# Patient Record
Sex: MALE | Race: BLACK OR AFRICAN AMERICAN | NOT HISPANIC OR LATINO | Employment: FULL TIME | URBAN - METROPOLITAN AREA
[De-identification: names, ages, dates, MRNs, and addresses within clinical notes are randomized per-mention and may not be internally consistent; named-entity substitution may affect disease eponyms.]

---

## 2017-02-11 ENCOUNTER — OFFICE VISIT (OUTPATIENT)
Dept: URGENT CARE | Facility: CLINIC | Age: 52
End: 2017-02-11
Payer: COMMERCIAL

## 2017-02-11 VITALS
SYSTOLIC BLOOD PRESSURE: 126 MMHG | WEIGHT: 192 LBS | OXYGEN SATURATION: 97 % | TEMPERATURE: 99 F | HEIGHT: 69 IN | HEART RATE: 76 BPM | BODY MASS INDEX: 28.44 KG/M2 | DIASTOLIC BLOOD PRESSURE: 84 MMHG

## 2017-02-11 DIAGNOSIS — R68.89 FLU-LIKE SYMPTOMS: Primary | ICD-10-CM

## 2017-02-11 DIAGNOSIS — Z20.828 EXPOSURE TO THE FLU: ICD-10-CM

## 2017-02-11 DIAGNOSIS — J06.9 URI WITH COUGH AND CONGESTION: ICD-10-CM

## 2017-02-11 LAB
CTP QC/QA: YES
FLUAV AG NPH QL: NEGATIVE
FLUBV AG NPH QL: NEGATIVE

## 2017-02-11 PROCEDURE — 99999 PR PBB SHADOW E&M-EST. PATIENT-LVL III: CPT | Mod: PBBFAC,,, | Performed by: PHYSICIAN ASSISTANT

## 2017-02-11 PROCEDURE — 3074F SYST BP LT 130 MM HG: CPT | Mod: S$GLB,,, | Performed by: PHYSICIAN ASSISTANT

## 2017-02-11 PROCEDURE — 3079F DIAST BP 80-89 MM HG: CPT | Mod: S$GLB,,, | Performed by: PHYSICIAN ASSISTANT

## 2017-02-11 PROCEDURE — 99214 OFFICE O/P EST MOD 30 MIN: CPT | Mod: S$GLB,,, | Performed by: PHYSICIAN ASSISTANT

## 2017-02-11 RX ORDER — PROMETHAZINE HYDROCHLORIDE AND DEXTROMETHORPHAN HYDROBROMIDE 6.25; 15 MG/5ML; MG/5ML
5 SYRUP ORAL NIGHTLY PRN
Qty: 120 ML | Refills: 0 | Status: SHIPPED | OUTPATIENT
Start: 2017-02-11 | End: 2017-02-21 | Stop reason: ALTCHOICE

## 2017-02-11 NOTE — MR AVS SNAPSHOT
[facility]  [address] [place] [address] [address]  [place] [address]  Phone: [phone]                  [facility]   2017 9:20 AM   Office Visit    Description:  Male : 1965   Provider:  Marie Spears PA-C   Department:  [facility]           Reason for Visit     Nasal Congestion     Cough           Diagnoses this Visit        Comments    Flu-like symptoms    -  Primary     Exposure to the flu         URI with cough and congestion     Zyrtec or Claritin  Flonase           To Do List           Future Appointments        Provider Department Dept Phone    2017 7:35 AM LABORATORY, [facility] [phone]    2017 9:40 AM Anders Linton MD [facility] - Internal Medicine [phone]      Goals (5 Years of Data)     None       These Medications        Disp Refills Start End    promethazine-dextromethorphan (PROMETHAZINE-DM) 6.25-15 mg/5 mL Syrp 120 mL 0 2017     Take 5 mLs by mouth nightly as needed (Cough). - Oral    Pharmacy: [facility] [address] [place] [address] #: [phone]         [facility]     [facility] -  Assistance  Registered nurses in the [facility] provide clinical advisement, health education, appointment booking, and other advisory services.  Call for this free service at [phone].             Medications           Message regarding Medications     Verify the changes and/or additions to your medication regime listed below are the same as discussed with your clinician today.  If any of these changes or additions are incorrect, please notify your healthcare provider.        START taking these NEW medications        Refills    promethazine-dextromethorphan (PROMETHAZINE-DM) 6.25-15 mg/5 mL Syrp 0    Sig: Take 5 mLs by mouth nightly as needed (Cough).    Class: Normal    Route: Oral           Verify that the below list of medications is an accurate representation  "of the medications you are currently taking.  If none reported, the list may be blank. If incorrect, please contact your healthcare provider. Carry this list with you in case of emergency.           Current Medications     butalbital-acetaminophen-caffeine -40 mg (FIORICET, ESGIC) -40 mg per tablet TAKE ONE TO TWO TABLETS BY MOUTH EVERY 4 HOURS AS NEEDED    hydrochlorothiazide (MICROZIDE) 12.5 mg capsule Take 1 capsule (12.5 mg total) by mouth once daily.    omeprazole (PRILOSEC) 40 MG capsule Take 1 capsule (40 mg total) by mouth once daily.    fexofenadine (ALLEGRA) 180 MG tablet Take 1 tablet (180 mg total) by mouth daily as needed (allergies0).    promethazine-dextromethorphan (PROMETHAZINE-DM) 6.25-15 mg/5 mL Syrp Take 5 mLs by mouth nightly as needed (Cough).           Clinical Reference Information           Your Vitals Were     BP Pulse Temp Height Weight SpO2    126/84 (BP Location: Right arm, Patient Position: Sitting, BP Method: Manual) 76 99 °F (37.2 °C) 5' 9" (1.753 m) 87.1 kg (192 lb 0.3 oz) 97%    BMI                28.36 kg/m2          Blood Pressure          Most Recent Value    BP  126/84      Allergies as of 2/11/2017     No Known Allergies      Immunizations Administered on Date of Encounter - 2/11/2017     None      Orders Placed During Today's Visit      Normal Orders This Visit    POCT Influenza A/B          2/11/2017 10:14 AM - Bere Calvillo LPN      Component Results     Component Value Flag Ref Range Units Status    Rapid Influenza A Ag Negative  Negative  Final    Rapid Influenza B Ag Negative  Negative  Final     Acceptable Yes    Final            [facility] Sign-Up     Activating your [facility] account is as easy as 1-2-3!     1) Visit [email or web address], select Sign Up Now, enter this activation code and your date of birth, then select Next.  [email or web address]  Expires: 3/28/2017 10:20 AM      2) Create a username and password to use when you visit [facility] in " the future and select a security question in case you lose your password and select Next.    3) Enter your e-mail address and click Sign Up!    Additional Information  If you have questions, please e-mail [email or web address] or call [phone] to talk to our [facility] staff. Remember, [facility] is NOT to be used for urgent needs. For medical emergencies, dial 911.         Instructions    Rest  Drink plenty of clear fluids--at least 64 ounces of water/juice  Normal saline nasal wash to irrigate sinuses and for congestion/runny nose  Cool mist humidifier/vaporizer  Practice good handwashing  Zyrtec or Claritin to help dry mucus and post nasal drip  Flonase to  help dry mucus and post nasal drip  Mucinex or Mucinex DM for cough and chest congestion  Tylenol or Ibuprofen for fever, headache and body aches  Warm salt water gargles for throat comfort  Chloraseptic spray or lozenges for throat comfort  Warm tea with honey for throat comfort  See PCP or go to ER if symptoms worsen or fail to improve with treatment.          Adult Self-Care for Colds  Colds are caused by viruses. They cant be cured with antibiotics. However, you can relieve symptoms and support your bodys efforts to heal itself. No matter which symptoms you have, be sure to drink plenty of fluids (water or clear soup); stop smoking and drinking alcohol; and get plenty of rest.   Understand a fever  · Take your temperature several times a day. If your fever is 100.4°F (38.0°C) for more than a day, call your doctor.  · Relax, lie down. Go to bed if you want. Just get off your feet and rest. Also, drink plenty of fluids to avoid dehydration.  · Take acetaminophen or a nonsteroidal anti-inflammatory agent (NSAID), such as ibuprofen.  Treat a troubled nose kindly  · Breathe steam or heated humidified air to open blocked nasal passages.  a hot shower or use a vaporizer. Be careful not to get burned by the steam.  · Saline nasal sprays and  decongestant tablets help open a stuffy nose. Antihistamines can also help, but they can cause side effects such as drowsiness and drying of the eyes, nose, and mouth.  Soothe a sore throat and cough  · Gargle every 2 hours with 1/4 teaspoon of salt dissolved in 1/2 cup of warm water. Suck on throat lozenges and cough drops to moisten your throat.  · Cough medicines are available but it is unclear how effective they actually are.  · Take acetaminophen or an NSAID, such as ibuprofen to ease throat pain  Ease digestive problems  · Put fluid back into your body. Take frequent sips of clear liquids such as water or broth. Do not drink beverages with a lot of sugar in them, such as juices and sodas. These can make diarrhea worse. Older children and adults can drink sports drinks.  · As your appetite returns, you can resume your normal diet. Ask your doctor whether there are any foods you should avoid.     When to seek medical care  When you first notice symptoms, ask your health care provider if antiviral medications are appropriate. Antibiotics should not be taken for colds or flu. Also, call your doctor if you have any of the following symptoms or if you arent feeling better after 7 days:  · Shortness of breath  · Pain or pressure in the chest or abdomen  · Worsening symptoms, especially after a period of improvement  · Fever of 100.4°F  (38.0°C) or higher, or fever that doesnt go down with medication  · Sudden dizziness or confusion  · Severe or continued vomiting  · Signs of dehydration, including extreme thirst, dark urine, infrequent urination, dry mouth  · Spotted, red, or very sore throat   Date Last Reviewed: 6/19/2014 © 2000-2016 [employer]. [address] [place] [address], [place] [address]. All rights reserved. This information is not intended as a substitute for professional medical care. Always follow your healthcare professional's instructions.             Language Assistance Services      ATTENTION: Language assistance services are available, free of charge. Please call [phone].      ATENCIÓN: Si habla tanyaañol, tiene a hammonds disposición servicios gratuitos de asistencia lingüística. Llame al [phone].     CHÚ Ý: N?u b?n nói Ti?ng Vi?t, có các d?ch v? h? tr? ngôn ng? mi?n phí dành cho b?n. G?i s? [phone].         [facility] complies with applicable Federal civil rights laws and does not discriminate on the basis of race, color, national origin, age, disability, or sex.

## 2017-02-11 NOTE — PATIENT INSTRUCTIONS
Rest  Drink plenty of clear fluids--at least 64 ounces of water/juice  Normal saline nasal wash to irrigate sinuses and for congestion/runny nose  Cool mist humidifier/vaporizer  Practice good handwashing  Zyrtec or Claritin to help dry mucus and post nasal drip  Flonase to  help dry mucus and post nasal drip  Mucinex or Mucinex DM for cough and chest congestion  Tylenol or Ibuprofen for fever, headache and body aches  Warm salt water gargles for throat comfort  Chloraseptic spray or lozenges for throat comfort  Warm tea with honey for throat comfort  See PCP or go to ER if symptoms worsen or fail to improve with treatment.          Adult Self-Care for Colds  Colds are caused by viruses. They cant be cured with antibiotics. However, you can relieve symptoms and support your bodys efforts to heal itself. No matter which symptoms you have, be sure to drink plenty of fluids (water or clear soup); stop smoking and drinking alcohol; and get plenty of rest.   Understand a fever  · Take your temperature several times a day. If your fever is 100.4°F (38.0°C) for more than a day, call your doctor.  · Relax, lie down. Go to bed if you want. Just get off your feet and rest. Also, drink plenty of fluids to avoid dehydration.  · Take acetaminophen or a nonsteroidal anti-inflammatory agent (NSAID), such as ibuprofen.  Treat a troubled nose kindly  · Breathe steam or heated humidified air to open blocked nasal passages.  a hot shower or use a vaporizer. Be careful not to get burned by the steam.  · Saline nasal sprays and decongestant tablets help open a stuffy nose. Antihistamines can also help, but they can cause side effects such as drowsiness and drying of the eyes, nose, and mouth.  Soothe a sore throat and cough  · Gargle every 2 hours with 1/4 teaspoon of salt dissolved in 1/2 cup of warm water. Suck on throat lozenges and cough drops to moisten your throat.  · Cough medicines are available but it is unclear how  effective they actually are.  · Take acetaminophen or an NSAID, such as ibuprofen to ease throat pain  Ease digestive problems  · Put fluid back into your body. Take frequent sips of clear liquids such as water or broth. Do not drink beverages with a lot of sugar in them, such as juices and sodas. These can make diarrhea worse. Older children and adults can drink sports drinks.  · As your appetite returns, you can resume your normal diet. Ask your doctor whether there are any foods you should avoid.     When to seek medical care  When you first notice symptoms, ask your health care provider if antiviral medications are appropriate. Antibiotics should not be taken for colds or flu. Also, call your doctor if you have any of the following symptoms or if you arent feeling better after 7 days:  · Shortness of breath  · Pain or pressure in the chest or abdomen  · Worsening symptoms, especially after a period of improvement  · Fever of 100.4°F  (38.0°C) or higher, or fever that doesnt go down with medication  · Sudden dizziness or confusion  · Severe or continued vomiting  · Signs of dehydration, including extreme thirst, dark urine, infrequent urination, dry mouth  · Spotted, red, or very sore throat   Date Last Reviewed: 6/19/2014  © 0125-1352 [employer]. [address] [place] [address], [place] [address]. All rights reserved. This information is not intended as a substitute for professional medical care. Always follow your healthcare professional's instructions.

## 2017-02-11 NOTE — PROGRESS NOTES
"Subjective:    Patient ID: Marco A Perez is a 51 y.o. male.    Chief Complaint: Nasal Congestion and Cough    URI    This is a new problem. The current episode started in the past 7 days (2 days ago). Associated symptoms include congestion, coughing, headaches, rhinorrhea, sneezing and a sore throat ("a little"). Pertinent negatives include no diarrhea, ear pain or vomiting. Wheezing: trying to breath through congested nose. Treatments tried: Coriciden. The treatment provided mild relief.     Review of Systems   Constitutional: Positive for chills and diaphoresis (feels hot then cold).   HENT: Positive for congestion, postnasal drip, rhinorrhea, sneezing and sore throat ("a little"). Negative for ear pain.    Respiratory: Positive for cough. Negative for shortness of breath. Wheezing: trying to breath through congested nose.    Gastrointestinal: Negative for diarrhea and vomiting.   Neurological: Positive for headaches.     Objective:     Visit Vitals    /84 (BP Location: Right arm, Patient Position: Sitting, BP Method: Manual)    Pulse 76    Temp 99 °F (37.2 °C)    Ht 5' 9" (1.753 m)    Wt 87.1 kg (192 lb 0.3 oz)    SpO2 97%    BMI 28.36 kg/m2       Physical Exam   Constitutional: He is oriented to person, place, and time. He appears well-developed and well-nourished. He is cooperative.   HENT:   Head: Normocephalic and atraumatic.   Right Ear: Hearing, tympanic membrane, external ear and ear canal normal.   Left Ear: Hearing, tympanic membrane, external ear and ear canal normal.   Nose: Mucosal edema present. Right sinus exhibits no maxillary sinus tenderness and no frontal sinus tenderness. Left sinus exhibits no maxillary sinus tenderness and no frontal sinus tenderness.   Mouth/Throat: Uvula is midline. Uvula swelling present.       Eyes: Conjunctivae, EOM and lids are normal. Pupils are equal, round, and reactive to light.   Neck: Normal range of motion. Neck supple.   Cardiovascular: Normal rate, " regular rhythm and normal heart sounds.    Pulmonary/Chest: Effort normal and breath sounds normal. No respiratory distress. He has no wheezes.   Musculoskeletal: Normal range of motion.   Lymphadenopathy:     He has no cervical adenopathy.   Neurological: He is alert and oriented to person, place, and time.   Skin: Skin is warm and dry.   Nursing note and vitals reviewed.    Assessment:     1. Flu-like symptoms    2. Exposure to the flu    3. URI with cough and congestion      Plan:   Flu-like symptoms  -     POCT Influenza A/B: negative    Exposure to the flu  -     POCT Influenza A/B: negative    URI with cough and congestion  Comments:  Zyrtec or Claritin or Allegra  Flonase  Orders:  -     promethazine-dextromethorphan (PROMETHAZINE-DM) 6.25-15 mg/5 mL Syrp; Take 5 mLs by mouth nightly as needed (Cough).  Dispense: 120 mL; Refill: 0      Rest  Drink plenty of clear fluids--at least 64 ounces of water/juice  Normal saline nasal wash to irrigate sinuses and for congestion/runny nose  Cool mist humidifier/vaporizer  Practice good handwashing  Zyrtec or Claritin to help dry mucus and post nasal drip  Flonase to  help dry mucus and post nasal drip  Mucinex or Mucinex DM for cough and chest congestion  Tylenol or Ibuprofen for fever, headache and body aches  Warm salt water gargles for throat comfort  Chloraseptic spray or lozenges for throat comfort  Warm tea with honey for throat comfort    If symptoms worsen or fail to improve, follow-up with primary care doctor or nearest ER. After visit summary given and discussed.  Patient verbalized understanding and agrees with treatment plan.  Patient remained stable and was discharged in no acute distress.

## 2017-02-21 ENCOUNTER — OFFICE VISIT (OUTPATIENT)
Dept: URGENT CARE | Facility: CLINIC | Age: 52
End: 2017-02-21
Payer: COMMERCIAL

## 2017-02-21 VITALS
OXYGEN SATURATION: 98 % | TEMPERATURE: 99 F | DIASTOLIC BLOOD PRESSURE: 70 MMHG | SYSTOLIC BLOOD PRESSURE: 120 MMHG | BODY MASS INDEX: 28.55 KG/M2 | WEIGHT: 193.31 LBS | HEART RATE: 82 BPM

## 2017-02-21 DIAGNOSIS — H65.191 OTHER ACUTE NONSUPPURATIVE OTITIS MEDIA OF RIGHT EAR, RECURRENCE NOT SPECIFIED: ICD-10-CM

## 2017-02-21 DIAGNOSIS — J01.00 ACUTE NON-RECURRENT MAXILLARY SINUSITIS: Primary | ICD-10-CM

## 2017-02-21 DIAGNOSIS — R07.0 PAIN IN THROAT: ICD-10-CM

## 2017-02-21 DIAGNOSIS — R05.9 COUGH: ICD-10-CM

## 2017-02-21 DIAGNOSIS — R07.0 THROAT PAIN: ICD-10-CM

## 2017-02-21 LAB
CTP QC/QA: YES
S PYO RRNA THROAT QL PROBE: NEGATIVE

## 2017-02-21 PROCEDURE — 87081 CULTURE SCREEN ONLY: CPT

## 2017-02-21 PROCEDURE — 99999 PR PBB SHADOW E&M-EST. PATIENT-LVL IV: CPT | Mod: PBBFAC,,, | Performed by: NURSE PRACTITIONER

## 2017-02-21 PROCEDURE — 3074F SYST BP LT 130 MM HG: CPT | Mod: S$GLB,,, | Performed by: NURSE PRACTITIONER

## 2017-02-21 PROCEDURE — 99214 OFFICE O/P EST MOD 30 MIN: CPT | Mod: S$GLB,,, | Performed by: NURSE PRACTITIONER

## 2017-02-21 PROCEDURE — 1160F RVW MEDS BY RX/DR IN RCRD: CPT | Mod: S$GLB,,, | Performed by: NURSE PRACTITIONER

## 2017-02-21 PROCEDURE — 3078F DIAST BP <80 MM HG: CPT | Mod: S$GLB,,, | Performed by: NURSE PRACTITIONER

## 2017-02-21 RX ORDER — CEFDINIR 300 MG/1
300 CAPSULE ORAL 2 TIMES DAILY
Qty: 14 CAPSULE | Refills: 0 | Status: SHIPPED | OUTPATIENT
Start: 2017-02-21 | End: 2017-02-28

## 2017-02-21 RX ORDER — BENZONATATE 200 MG/1
200 CAPSULE ORAL 3 TIMES DAILY PRN
Qty: 30 CAPSULE | Refills: 0 | Status: SHIPPED | OUTPATIENT
Start: 2017-02-21 | End: 2017-09-05

## 2017-02-21 NOTE — MR AVS SNAPSHOT
[facility]  [address] [place] [address] D  [place] [address]  Phone: [phone]                  [facility]   2017 7:00 PM   Office Visit    Description:  Male : 1965   Provider:  MEY Sheikh   Department:  [facility]           Reason for Visit     Cough           Diagnoses this Visit        Comments    Acute non-recurrent maxillary sinusitis    -  Primary     Other acute nonsuppurative otitis media of right ear, recurrence not specified         Cough         Pain in throat                To Do List           Future Appointments        Provider Department Dept Phone    2017 7:35 AM LAB[facility]ATORY, [facility] [phone]    2017 9:40 AM Anders Linton MD [facility] - Internal Medicine [phone]      Goals (5 Years of Data)     None       These Medications        Disp Refills Start End    cefdinir (OMNICEF) 300 MG capsule 14 capsule 0 2017    Take 1 capsule (300 mg total) by mouth 2 (two) times daily. - Oral    Pharmacy: [facility] [address] [place] [address] Ph #: [phone]       benzonatate (TESSALON) 200 MG capsule 30 capsule 0 2017     Take 1 capsule (200 mg total) by mouth 3 (three) times daily as needed for Cough. - Oral    Pharmacy: [facility] [address] [place] [address] Ph #: [phone]       brompheniramine-pseudoephedrine-dextromethorphan (DIMETAPP DM) 1-15-5 mg/5 mL Elix 118 mL 0 2017 3/3/2017    Take 5 mLs by mouth every 6 (six) hours as needed (cough). - Oral    Pharmacy: [facility] [address] [place] [address] Ph #: [phone]         [facility]     [facility] On Call Nurse Care Line -  Assistance  Registered nurses in the [facility] provide clinical advisement, health education, appointment booking, and other advisory services.  Call for this free service at [phone].             Medications           Message  regarding Medications     Verify the changes and/or additions to your medication regime listed below are the same as discussed with your clinician today.  If any of these changes or additions are incorrect, please notify your healthcare provider.        START taking these NEW medications        Refills    cefdinir (OMNICEF) 300 MG capsule 0    Sig: Take 1 capsule (300 mg total) by mouth 2 (two) times daily.    Class: Normal    Route: Oral    benzonatate (TESSALON) 200 MG capsule 0    Sig: Take 1 capsule (200 mg total) by mouth 3 (three) times daily as needed for Cough.    Class: Normal    Route: Oral    brompheniramine-pseudoephedrine-dextromethorphan (DIMETAPP DM) 1-15-5 mg/5 mL Elix 0    Sig: Take 5 mLs by mouth every 6 (six) hours as needed (cough).    Class: Print    Route: Oral      STOP taking these medications     promethazine-dextromethorphan (PROMETHAZINE-DM) 6.25-15 mg/5 mL Syrp Take 5 mLs by mouth nightly as needed (Cough).    fexofenadine (ALLEGRA) 180 MG tablet Take 1 tablet (180 mg total) by mouth daily as needed (allergies0).           Verify that the below list of medications is an accurate representation of the medications you are currently taking.  If none reported, the list may be blank. If incorrect, please contact your healthcare provider. Carry this list with you in case of emergency.           Current Medications     butalbital-acetaminophen-caffeine -40 mg (FIORICET, ESGIC) -40 mg per tablet TAKE ONE TO TWO TABLETS BY MOUTH EVERY 4 HOURS AS NEEDED    hydrochlorothiazide (MICROZIDE) 12.5 mg capsule Take 1 capsule (12.5 mg total) by mouth once daily.    benzonatate (TESSALON) 200 MG capsule Take 1 capsule (200 mg total) by mouth 3 (three) times daily as needed for Cough.    brompheniramine-pseudoephedrine-dextromethorphan (DIMETAPP DM) 1-15-5 mg/5 mL Elix Take 5 mLs by mouth every 6 (six) hours as needed (cough).    cefdinir (OMNICEF) 300 MG capsule Take 1 capsule (300 mg total) by  mouth 2 (two) times daily.    omeprazole (PRILOSEC) 40 MG capsule Take 1 capsule (40 mg total) by mouth once daily.           Clinical Reference Information           Your Vitals Were     BP Pulse Temp Weight SpO2 BMI    120/70 82 98.9 °F (37.2 °C) 87.7 kg (193 lb 5.5 oz) 98% 28.55 kg/m2      Blood Pressure          Most Recent Value    BP  120/70      Allergies as of 2/21/2017     No Known Allergies      Immunizations Administered on Date of Encounter - 2/21/2017     None      [facility] Sign-Up     Activating your [facility] account is as easy as 1-2-3!     1) Visit [email or web address], select Sign Up Now, enter this activation code and your date of birth, then select Next.  [email or web address]  Expires: 3/28/2017 10:20 AM      2) Create a username and password to use when you visit [facility] in the future and select a security question in case you lose your password and select Next.    3) Enter your e-mail address and click Sign Up!    Additional Information  If you have questions, please e-mail [email or web address] or call [phone] to talk to our [facility] staff. Remember, [facility] is NOT to be used for urgent needs. For medical emergencies, dial 911.         Instructions      Middle Ear Infection (Adult)  You have an infection of the middle ear, the space behind the eardrum. This is also called acute otitis media (AOM). Sometimes it is caused by the common cold. This is because congestion can block the internal passage (eustachian tube) that drains fluid from the middle ear. When the middle ear fills with fluid, bacteria can grow there and cause an infection. Oral antibiotics are used to treat this illness, not ear drops. Symptoms usually start to improve within 1 to 2 days of treatment.    Home care  The following are general care guidelines:  · Finish all of the antibiotic medicine given, even though you may feel better after the first few days.  · You may use over-the-counter medicine, such as acetaminophen or  ibuprofen, to control pain and fever, unless something else was prescribed. If you have chronic liver or kidney disease or have ever had a stomach ulcer or gastrointestinal bleeding, talk with your healthcare provider before using these medicines. Do not give aspirin to anyone under 18 years of age who has a fever. It may cause severe illness or death.  Follow-up care  Follow up with your healthcare provider, or as advised, in 2 weeks if all symptoms have not gotten better, or if hearing doesn't go back to normal within 1 month.  When to seek medical advice  Call your healthcare provider right away if any of these occur:  · Ear pain gets worse or does not improve after 3 days of treatment  · Unusual drowsiness or confusion  · Neck pain, stiff neck, or headache  · Fluid or blood draining from the ear canal  · Fever of 100.4°F (38°C) or as advised   · Seizure  Date Last Reviewed: 6/1/2016  © 2337-2414 [employer]. [address] [place] [address]. All rights reserved. This information is not intended as a substitute for professional medical care. Always follow your healthcare professional's instructions.             Language Assistance Services     ATTENTION: Language assistance services are available, free of charge. Please call [phone].      ATENCIÓN: Si habla shala, tiene a hammonds disposición servicios gratuitos de asistencia lingüística. Llame al [phone].     CHÚ Ý: N?u b?n nói Ti?ng Vi?t, có các d?ch v? h? tr? ngôn ng? mi?n phí dành cho b?n. G?i s? [phone].         [facility] complies with applicable Federal civil rights laws and does not discriminate on the basis of race, color, national origin, age, disability, or sex.

## 2017-02-21 NOTE — LETTER
February 21, 2017      [facility]  [address]  [place] [address]  Phone: [phone]       Patient: Marco A Perez   YOB: 1965  Date of Visit: 02/21/2017    To Whom It May Concern:    Marco A was at [facility] on 02/21/2017. He may return to work/school on 02/23/17 with no restrictions. If you have any questions or concerns, or if I can be of further assistance, please do not hesitate to contact me.    Sincerely,      MEY Sheikh

## 2017-02-22 NOTE — PROGRESS NOTES
Subjective:       Patient ID: Marco A Perez is a 51 y.o. male.    Chief Complaint: Cough    HPI Comments: 51 year old black male presents to Urgent Care with reports of cough,right ear pain with associated sore throat and sinus pressure that has been present for about 1 week and not improving.     Cough   This is a new problem. The current episode started 1 to 4 weeks ago. The problem has been gradually worsening. The cough is productive of sputum. Associated symptoms include ear congestion, ear pain, nasal congestion, postnasal drip and a sore throat. Pertinent negatives include no chest pain, chills, fever, rash or shortness of breath. Associated symptoms comments: Right ear pain and sinus pressure and tenderness  . Nothing aggravates the symptoms. Risk factors for lung disease include animal exposure. The treatment provided no relief. There is no history of environmental allergies.     Review of Systems   Constitutional: Negative for appetite change, chills and fever.   HENT: Positive for ear pain, postnasal drip, sinus pressure and sore throat. Negative for trouble swallowing.         Right ear pain   Eyes: Negative for visual disturbance.   Respiratory: Positive for cough. Negative for shortness of breath.    Cardiovascular: Negative for chest pain.   Gastrointestinal: Negative for abdominal pain, diarrhea, nausea and vomiting.   Endocrine: Negative for cold intolerance, polyphagia and polyuria.   Genitourinary: Negative for decreased urine volume and dysuria.   Musculoskeletal: Negative for back pain.   Skin: Negative for rash.   Allergic/Immunologic: Negative for environmental allergies and food allergies.   Neurological: Negative for dizziness, tremors, weakness and numbness.   Hematological: Does not bruise/bleed easily.   Psychiatric/Behavioral: Negative for confusion and hallucinations. The patient is not nervous/anxious and is not hyperactive.    All other systems reviewed and are negative.      Objective:      Physical Exam   Constitutional: He is oriented to person, place, and time. He appears well-developed and well-nourished.   HENT:   Head: Normocephalic.   Right Ear: Hearing and external ear normal. Tympanic membrane is erythematous and bulging.   Left Ear: External ear normal.   Nose: Mucosal edema and sinus tenderness present. Right sinus exhibits maxillary sinus tenderness. Left sinus exhibits maxillary sinus tenderness.       Mouth/Throat: Uvula is midline. Posterior oropharyngeal erythema present. No oropharyngeal exudate.   Eyes: Conjunctivae and EOM are normal. Pupils are equal, round, and reactive to light.   Neck: Normal range of motion. Neck supple. No JVD present.   Cardiovascular: Normal rate, regular rhythm, normal heart sounds and intact distal pulses.    Pulmonary/Chest: Effort normal and breath sounds normal. He has no wheezes.   Abdominal: Soft. Bowel sounds are normal. There is no tenderness.   Lymphadenopathy:     He has no cervical adenopathy.   Neurological: He is alert and oriented to person, place, and time.   Skin: Skin is warm and dry.   Psychiatric: He has a normal mood and affect. His behavior is normal. Judgment and thought content normal.   Nursing note and vitals reviewed.    Assessment:     1. Acute non-recurrent maxillary sinusitis    2. Other acute nonsuppurative otitis media of right ear, recurrence not specified    3. Cough    4. Pain in throat    5. Throat pain      Plan:  Marco A was seen today for cough.    Diagnoses and all orders for this visit:    Acute non-recurrent maxillary sinusitis  -     POCT Rapid Strep A    Other acute nonsuppurative otitis media of right ear, recurrence not specified  -     POCT Rapid Strep A    Cough  -     POCT Rapid Strep A    Pain in throat  -     Strep A culture, throat  -     POCT Rapid Strep A    Throat pain  -     Strep A culture, throat  -     POCT Rapid Strep A    Other orders  -     cefdinir (OMNICEF) 300 MG capsule; Take 1 capsule (300  mg total) by mouth 2 (two) times daily.  -     benzonatate (TESSALON) 200 MG capsule; Take 1 capsule (200 mg total) by mouth 3 (three) times daily as needed for Cough.  -     brompheniramine-pseudoephedrine-dextromethorphan (DIMETAPP DM) 1-15-5 mg/5 mL Elix; Take 5 mLs by mouth every 6 (six) hours as needed (cough).             Follow prescribed treatment plan as directed.  Stay hydrated and rest.  Report to ER if symptoms worsen.  Follow up with PCP in 2-3 days or sooner if symptoms do not improve.

## 2017-02-22 NOTE — PATIENT INSTRUCTIONS
Middle Ear Infection (Adult)  You have an infection of the middle ear, the space behind the eardrum. This is also called acute otitis media (AOM). Sometimes it is caused by the common cold. This is because congestion can block the internal passage (eustachian tube) that drains fluid from the middle ear. When the middle ear fills with fluid, bacteria can grow there and cause an infection. Oral antibiotics are used to treat this illness, not ear drops. Symptoms usually start to improve within 1 to 2 days of treatment.    Home care  The following are general care guidelines:  · Finish all of the antibiotic medicine given, even though you may feel better after the first few days.  · You may use over-the-counter medicine, such as acetaminophen or ibuprofen, to control pain and fever, unless something else was prescribed. If you have chronic liver or kidney disease or have ever had a stomach ulcer or gastrointestinal bleeding, talk with your healthcare provider before using these medicines. Do not give aspirin to anyone under 18 years of age who has a fever. It may cause severe illness or death.  Follow-up care  Follow up with your healthcare provider, or as advised, in 2 weeks if all symptoms have not gotten better, or if hearing doesn't go back to normal within 1 month.  When to seek medical advice  Call your healthcare provider right away if any of these occur:  · Ear pain gets worse or does not improve after 3 days of treatment  · Unusual drowsiness or confusion  · Neck pain, stiff neck, or headache  · Fluid or blood draining from the ear canal  · Fever of 100.4°F (38°C) or as advised   · Seizure  Date Last Reviewed: 6/1/2016  © 2889-6239 [employer]. [address] [place] [address], [place] [address]. All rights reserved. This information is not intended as a substitute for professional medical care. Always follow your healthcare professional's instructions.

## 2017-02-25 LAB — BACTERIA THROAT CULT: NORMAL

## 2017-03-03 ENCOUNTER — TELEPHONE (OUTPATIENT)
Dept: INTERNAL MEDICINE | Facility: CLINIC | Age: 52
End: 2017-03-03

## 2017-03-03 NOTE — TELEPHONE ENCOUNTER
----- Message from Olamide Cuellar sent at 3/3/2017 10:02 AM CST -----  Contact: pt  Pt wants call back to discuss high blood pressure, pt can be reached at [phone]///thxMW

## 2017-03-03 NOTE — TELEPHONE ENCOUNTER
----- Message from Delores Luque sent at 3/3/2017 11:53 AM CST -----  Call pt at [phone]//pt say he calling to give a different # he left message earlier//lida guzman

## 2017-03-06 ENCOUNTER — OFFICE VISIT (OUTPATIENT)
Dept: INTERNAL MEDICINE | Facility: CLINIC | Age: 52
End: 2017-03-06
Payer: COMMERCIAL

## 2017-03-06 VITALS
HEIGHT: 71 IN | HEART RATE: 83 BPM | BODY MASS INDEX: 27.38 KG/M2 | WEIGHT: 195.56 LBS | SYSTOLIC BLOOD PRESSURE: 132 MMHG | TEMPERATURE: 98 F | OXYGEN SATURATION: 99 % | DIASTOLIC BLOOD PRESSURE: 92 MMHG

## 2017-03-06 DIAGNOSIS — I10 ESSENTIAL HYPERTENSION: Primary | ICD-10-CM

## 2017-03-06 PROCEDURE — 90686 IIV4 VACC NO PRSV 0.5 ML IM: CPT | Mod: S$GLB,,, | Performed by: FAMILY MEDICINE

## 2017-03-06 PROCEDURE — 1160F RVW MEDS BY RX/DR IN RCRD: CPT | Mod: S$GLB,,, | Performed by: FAMILY MEDICINE

## 2017-03-06 PROCEDURE — 90471 IMMUNIZATION ADMIN: CPT | Mod: S$GLB,,, | Performed by: FAMILY MEDICINE

## 2017-03-06 PROCEDURE — 3075F SYST BP GE 130 - 139MM HG: CPT | Mod: S$GLB,,, | Performed by: FAMILY MEDICINE

## 2017-03-06 PROCEDURE — 99999 PR PBB SHADOW E&M-EST. PATIENT-LVL III: CPT | Mod: PBBFAC,,, | Performed by: FAMILY MEDICINE

## 2017-03-06 PROCEDURE — 99213 OFFICE O/P EST LOW 20 MIN: CPT | Mod: 25,S$GLB,, | Performed by: FAMILY MEDICINE

## 2017-03-06 PROCEDURE — 3080F DIAST BP >= 90 MM HG: CPT | Mod: S$GLB,,, | Performed by: FAMILY MEDICINE

## 2017-03-06 NOTE — MR AVS SNAPSHOT
[facility]  [address] [place] [address]  Phone: [phone]  Fax: [phone]                  Marco A Perez   3/6/2017 11:40 AM   Office Visit    Description:  Male : 1965   Provider:  Anders Linton MD   Department:  [facility]                To Do List           Future Appointments        Provider Department Dept Phone    3/13/2017 10:00 AM INTERNAL MEDICINE NURSE, [facility] [phone]    2017 7:35 AM LABORATORY, [facility] [phone]    2017 9:40 AM Anders Linton MD [facility] [phone]      Goals (5 Years of Data)     None      [facility] On [facility]     [facility] Care [facility] -  Assistance  Registered nurses in the [facility] provide clinical advisement, health education, appointment booking, and other advisory services.  Call for this free service at [phone].             Medications           Message regarding Medications     Verify the changes and/or additions to your medication regime listed below are the same as discussed with your clinician today.  If any of these changes or additions are incorrect, please notify your healthcare provider.             Verify that the below list of medications is an accurate representation of the medications you are currently taking.  If none reported, the list may be blank. If incorrect, please contact your healthcare provider. Carry this list with you in case of emergency.           Current Medications     butalbital-acetaminophen-caffeine -40 mg (FIORICET, ESGIC) -40 mg per tablet TAKE ONE TO TWO TABLETS BY MOUTH EVERY 4 HOURS AS NEEDED    hydrochlorothiazide (MICROZIDE) 12.5 mg capsule Take 1 capsule (12.5 mg total) by mouth once daily.    omeprazole (PRILOSEC) 40 MG capsule Take 1 capsule (40 mg total) by mouth once daily.    benzonatate (TESSALON) 200 MG capsule Take 1 capsule (200 mg total) by  "mouth 3 (three) times daily as needed for Cough.           Clinical Reference Information           Your Vitals Were     BP Pulse Temp Height    132/92 (BP Location: Right arm, Patient Position: Sitting, BP Method: Manual) 83 97.8 °F (36.6 °C) (Tympanic) 5' 11" (1.803 m)    Weight SpO2 BMI    88.7 kg (195 lb 8.8 oz) 99% 27.27 kg/m2      Blood Pressure          Most Recent Value    BP  (!)  132/92      Allergies as of 3/6/2017     No Known Allergies      Immunizations Administered on Date of Encounter - 3/6/2017     None      [facility] Sign-Up     Activating your [facility] account is as easy as 1-2-3!     1) Visit [email or web address], select Sign Up Now, enter this activation code and your date of birth, then select Next.  [email or web address]  Expires: 3/28/2017 10:20 AM      2) Create a username and password to use when you visit [facility] in the future and select a security question in case you lose your password and select Next.    3) Enter your e-mail address and click Sign Up!    Additional Information  If you have questions, please e-mail [email or web address] or call [phone] to talk to our [facility] staff. Remember, [facility] is NOT to be used for urgent needs. For medical emergencies, dial 911.         Language Assistance Services     ATTENTION: Language assistance services are available, free of charge. Please call [phone].      ATENCIÓN: Si habla español, tiene a hammonds disposición servicios gratuitos de asistencia lingüística. Llame al [phone].     [facility] Ý: N?u b?n nói Ti?ng Vi?t, có các d?ch v? h? tr? ngôn ng? mi?n phí dành cho b?n. G?i s? [phone].         [facility] complies with applicable Federal civil rights laws and does not discriminate on the basis of race, color, national origin, age, disability, or sex.        "

## 2017-03-13 ENCOUNTER — CLINICAL SUPPORT (OUTPATIENT)
Dept: INTERNAL MEDICINE | Facility: CLINIC | Age: 52
End: 2017-03-13
Payer: COMMERCIAL

## 2017-03-20 NOTE — PROGRESS NOTES
Subjective:       Patient ID: Marco A Perez is a 52 y.o. male.    Chief Complaint: htn  HPIrecent phys ant work and uc visit w decong w inc bp;bps very nl prior to decong use  Still mild sore thr at ear pain  130/90 home since no decong few days  Past Medical History:   Diagnosis Date    HTN (hypertension)     Migraine      History reviewed. No pertinent surgical history.  Family History   Problem Relation Age of Onset    Heart disease Mother     Heart disease Father     Diabetes Sister      Social History     Social History    Marital status:      Spouse name: N/A    Number of children: N/A    Years of education: N/A     Social History Main Topics    Smoking status: Never Smoker    Smokeless tobacco: None    Alcohol use No    Drug use: No    Sexual activity: Not Asked     Other Topics Concern    None     Social History Narrative       Review of Systems    Objective:      Physical Exam   Constitutional: He is oriented to person, place, and time. He appears well-developed and well-nourished.   HENT:   Head: Normocephalic and atraumatic.   Right Ear: External ear normal.   Left Ear: External ear normal.   Nose: Nose normal.   Mouth/Throat: Oropharynx is clear and moist. No oropharyngeal exudate.   Nl tms   Eyes: Conjunctivae and EOM are normal. Pupils are equal, round, and reactive to light.   Neck: Normal range of motion. Neck supple. Carotid bruit is not present.   Cardiovascular: Normal rate and regular rhythm.    Pulmonary/Chest: Effort normal and breath sounds normal.   Lymphadenopathy:     He has no cervical adenopathy.   Neurological: He is alert and oriented to person, place, and time.   Skin: Skin is warm and dry.   Psychiatric: He has a normal mood and affect. His behavior is normal. Judgment and thought content normal.       Assessment:       1. Essential hypertension        Plan:     few more days off cold med  Monitor bps  [facility] nurse bp chk one week  *Essential hypertension    Other  orders  -     Influenza - Quadrivalent (3 years & older) (PF)    **

## 2017-03-29 VITALS — SYSTOLIC BLOOD PRESSURE: 130 MMHG | DIASTOLIC BLOOD PRESSURE: 84 MMHG

## 2017-03-29 NOTE — PROGRESS NOTES
Pt came in for bp check. Pt bp 134/86 and 10 min recheck 130/84. Pt states home bps are 130s/90s. Pt informed will call with recommendations.

## 2017-04-13 ENCOUNTER — APPOINTMENT (OUTPATIENT)
Dept: RADIOLOGY | Facility: HOSPITAL | Age: 52
End: 2017-04-13
Attending: NURSE PRACTITIONER
Payer: COMMERCIAL

## 2017-04-13 ENCOUNTER — OFFICE VISIT (OUTPATIENT)
Dept: URGENT CARE | Facility: CLINIC | Age: 52
End: 2017-04-13
Payer: COMMERCIAL

## 2017-04-13 VITALS
OXYGEN SATURATION: 97 % | TEMPERATURE: 97 F | BODY MASS INDEX: 27.72 KG/M2 | SYSTOLIC BLOOD PRESSURE: 136 MMHG | WEIGHT: 198 LBS | RESPIRATION RATE: 18 BRPM | DIASTOLIC BLOOD PRESSURE: 90 MMHG | HEART RATE: 78 BPM | HEIGHT: 71 IN

## 2017-04-13 DIAGNOSIS — M67.432 GANGLION CYST OF WRIST, LEFT: ICD-10-CM

## 2017-04-13 DIAGNOSIS — M25.532 WRIST PAIN, ACUTE, LEFT: Primary | ICD-10-CM

## 2017-04-13 DIAGNOSIS — M25.532 WRIST PAIN, ACUTE, LEFT: ICD-10-CM

## 2017-04-13 PROCEDURE — 99214 OFFICE O/P EST MOD 30 MIN: CPT | Mod: S$GLB,,, | Performed by: NURSE PRACTITIONER

## 2017-04-13 PROCEDURE — 73110 X-RAY EXAM OF WRIST: CPT | Mod: 26,LT,, | Performed by: RADIOLOGY

## 2017-04-13 PROCEDURE — 3078F DIAST BP <80 MM HG: CPT | Mod: S$GLB,,, | Performed by: NURSE PRACTITIONER

## 2017-04-13 PROCEDURE — 1160F RVW MEDS BY RX/DR IN RCRD: CPT | Mod: S$GLB,,, | Performed by: NURSE PRACTITIONER

## 2017-04-13 PROCEDURE — 99999 PR PBB SHADOW E&M-EST. PATIENT-LVL IV: CPT | Mod: PBBFAC,,, | Performed by: NURSE PRACTITIONER

## 2017-04-13 PROCEDURE — 73110 X-RAY EXAM OF WRIST: CPT | Mod: TC,PO,LT

## 2017-04-13 PROCEDURE — 3075F SYST BP GE 130 - 139MM HG: CPT | Mod: S$GLB,,, | Performed by: NURSE PRACTITIONER

## 2017-04-13 RX ORDER — MELOXICAM 7.5 MG/1
7.5 TABLET ORAL DAILY
Qty: 30 TABLET | Refills: 0 | Status: SHIPPED | OUTPATIENT
Start: 2017-04-13 | End: 2017-09-05 | Stop reason: SDUPTHER

## 2017-04-13 NOTE — MR AVS SNAPSHOT
[facility]  [address]  [place] [address]  Phone: [phone]                  [facility]   2017 7:50 PM   Office Visit    Description:  Male : 1965   Provider:  Maria Elena Caban NP   Department:  [facility]           Reason for Visit     Cyst           Diagnoses this Visit        Comments    Wrist pain, acute, left    -  Primary     Ganglion cyst of wrist, left                To Do List           Future Appointments        Provider Department Dept Phone    2017 7:35 AM LABORATORY, [facility] [phone]    2017 9:40 AM Anders Linton MD [facility] [phone]      Goals (5 Years of Data)     None      Follow-Up and Disposition     Return if symptoms worsen or fail to improve.       These Medications        Disp Refills Start End    meloxicam (MOBIC) 7.5 MG tablet 30 tablet 0 2017     Take 1 tablet (7.5 mg total) by mouth once daily. - Oral    Pharmacy: [facility] [address] [place] [address] #: [phone]         [facility]     [facility] Care [facility] -  Assistance  Unless otherwise directed by your provider, please contact [facility], our nurse care line that is available for  assistance.     Registered nurses in the [facility] provide: appointment scheduling, clinical advisement, health education, and other advisory services.  Call: [phone] (toll free)               Medications           Message regarding Medications     Verify the changes and/or additions to your medication regime listed below are the same as discussed with your clinician today.  If any of these changes or additions are incorrect, please notify your healthcare provider.        START taking these NEW medications        Refills    meloxicam (MOBIC) 7.5 MG tablet 0    Sig: Take 1 tablet (7.5 mg total) by mouth once daily.    Class: Normal    Route: Oral          "  Verify that the below list of medications is an accurate representation of the medications you are currently taking.  If none reported, the list may be blank. If incorrect, please contact your healthcare provider. Carry this list with you in case of emergency.           Current Medications     butalbital-acetaminophen-caffeine -40 mg (FIORICET, ESGIC) -40 mg per tablet TAKE ONE TO TWO TABLETS BY MOUTH EVERY 4 HOURS AS NEEDED    hydrochlorothiazide (MICROZIDE) 12.5 mg capsule Take 1 capsule (12.5 mg total) by mouth once daily.    omeprazole (PRILOSEC) 40 MG capsule Take 1 capsule (40 mg total) by mouth once daily.    benzonatate (TESSALON) 200 MG capsule Take 1 capsule (200 mg total) by mouth 3 (three) times daily as needed for Cough.    meloxicam (MOBIC) 7.5 MG tablet Take 1 tablet (7.5 mg total) by mouth once daily.           Clinical Reference Information           Your Vitals Were     BP Pulse Temp Resp    136/90 (BP Location: Right arm, Patient Position: Sitting, BP Method: Automatic) 78 97.2 °F (36.2 °C) (Tympanic) 18    Height Weight SpO2 BMI    5' 11" (1.803 m) 89.8 kg (197 lb 15.6 oz) 97% 27.61 kg/m2      Blood Pressure          Most Recent Value    BP  (!)  136/90      Allergies as of 4/13/2017     No Known Allergies      Immunizations Administered on Date of Encounter - 4/13/2017     None      Orders Placed During Today's Visit     Future Labs/Procedures Expected by Expires    X-Ray Wrist Complete Left  4/13/2017 4/13/2018      [facility] Sign-Up     Activating your [facility] account is as easy as 1-2-3!     1) Visit [email or web address], select Sign Up Now, enter this activation code and your date of birth, then select Next.  [email or web address]  Expires: 5/28/2017  8:23 PM      2) Create a username and password to use when you visit [facility] in the future and select a security question in case you lose your password and select Next.    3) Enter your e-mail address and click Sign Up!    Additional " Information  If you have questions, please e-mail [email or web address] or call [phone] to talk to our [facility] staff. Remember, [facility] is NOT to be used for urgent needs. For medical emergencies, dial 911.         Instructions      Ganglion Cyst    A ganglion cyst usually is a painless bump on the wrist or finger joint. It connects to the joint capsule and grows like a balloon on a stalk. It is filled with joint fluid. The cause of a ganglion cyst is not known.   If the cyst puts pressure on a nearby nerve it may cause pain. Otherwise, cysts are painless and harmless. Most tend to disappear over time without treatment. Do not try to drain or break the cyst at home. This can cause harm and does not cure the problem.  If you are having pain from the cyst, a temporary wrist splint may be helpful to limit wrist motion. If this does not help, the fluid can be removed from the cyst. This should shrink the size of the cyst. If this doesnt give relief, the ganglion can be removed by surgery.  Home care  · If you are having wrist pain, use a wrist splint for 1-2 weeks at a time. You can buy one at many drug stores without a prescription.  · You may use over-the-counter pain medicine to control pain, unless another medicine was prescribed. If you have chronic liver or kidney disease or ever had a stomach ulcer or GI bleeding, talk with your healthcare provider before using these medicines.    · If a needle was used to drain the cyst fluid or inject medicine into it, keep the site clean and covered with a bandage for the first 24 hours. If a pressure dressing was applied, leave it in place for the time advised.  Follow-up care  Follow up with your healthcare provider, or as advised by our staff. Make an appointment for a repeat exam if pain continues for more than 2 weeks in a wrist splint.  When to seek medical advice  Call your healthcare provider right away if any of these occur:  · Increasing pain in the  wrist  · Redness over the cyst  · Fluid draining from the cyst  · Numbness or tingling in the hand or arm  Date Last Reviewed: 11/20/2015 © 2000-2016 [employer]. [address] [place] [address], [place] [address]. All rights reserved. This information is not intended as a substitute for professional medical care. Always follow your healthcare professional's instructions.        Treating Ganglia  A ganglion is a swelling (cyst) that forms on joint and tendon sheaths. They are most often found in the wrist. But they can also show up on the foot, fingers, or toes. Ganglia are believed to be caused by a rupture of the tissue that lines the joints and tendon sheaths (synovial tissue).   Ganglia are often difficult to treat without surgery--but nonsurgical methods may help relieve some of your symptoms.  Nonsurgical care  Nonsurgical methods include:   · Pads placed around the ganglion can ease pressure and friction.  · Removing the fluid may also relieve symptoms, though ganglia may come back. The gelatinous gel is removed through a large-bore needle. You may get a steroid injection after the cyst fluid is removed.  · Limiting movements or activities that increase pain may bring relief.  · Icing the ganglion for 15 to 20 minutes may temporarily relieve inflammation and pain.  · If your inflammation is severe, your healthcare provider may treat your symptoms with medicine.    Surgery  If a ganglion is causing ongoing or severe pain, your healthcare provider may recommend surgery. Your surgeon removes the entire ganglion wall during the procedure. He or she may also remove some surrounding tissue. If the ganglion has come through a tear in the capsule of the joint, the tear will have to be repaired. The joint will likely be get still while the tear heals.  After surgery  You may feel pain, swelling, numbness, or tingling for several weeks after surgery. Be sure to see your healthcare provider if you notice any  problems in the future. Although surgery is usually successful, there is a chance that the ganglion will come back.  Date Last Reviewed: 9/27/2015  © 5573-4554 [employer]. [address] [place] [address], [place] [address]. All rights reserved. This information is not intended as a substitute for professional medical care. Always follow your healthcare professional's instructions.             Language Assistance Services     ATTENTION: Language assistance services are available, free of charge. Please call [phone].      ATENCIÓN: Si carmenla shala, tiene a hammonds disposición servicios gratuitos de asistencia lingüística. Llame al [phone].     [facility] Ý: N?u b?n nói Ti?ng Vi?t, có các d?ch v? h? tr? ngôn ng? mi?n phí dành cho b?n. G?i s? [phone].         [place] [facility] complies with applicable Federal civil rights laws and does not discriminate on the basis of race, color, national origin, age, disability, or sex.

## 2017-04-14 NOTE — PATIENT INSTRUCTIONS
Ganglion Cyst    A ganglion cyst usually is a painless bump on the wrist or finger joint. It connects to the joint capsule and grows like a balloon on a stalk. It is filled with joint fluid. The cause of a ganglion cyst is not known.   If the cyst puts pressure on a nearby nerve it may cause pain. Otherwise, cysts are painless and harmless. Most tend to disappear over time without treatment. Do not try to drain or break the cyst at home. This can cause harm and does not cure the problem.  If you are having pain from the cyst, a temporary wrist splint may be helpful to limit wrist motion. If this does not help, the fluid can be removed from the cyst. This should shrink the size of the cyst. If this doesnt give relief, the ganglion can be removed by surgery.  Home care  · If you are having wrist pain, use a wrist splint for 1-2 weeks at a time. You can buy one at many drug stores without a prescription.  · You may use over-the-counter pain medicine to control pain, unless another medicine was prescribed. If you have chronic liver or kidney disease or ever had a stomach ulcer or GI bleeding, talk with your healthcare provider before using these medicines.    · If a needle was used to drain the cyst fluid or inject medicine into it, keep the site clean and covered with a bandage for the first 24 hours. If a pressure dressing was applied, leave it in place for the time advised.  Follow-up care  Follow up with your healthcare provider, or as advised by our staff. Make an appointment for a repeat exam if pain continues for more than 2 weeks in a wrist splint.  When to seek medical advice  Call your healthcare provider right away if any of these occur:  · Increasing pain in the wrist  · Redness over the cyst  · Fluid draining from the cyst  · Numbness or tingling in the hand or arm  Date Last Reviewed: 11/20/2015  © 6229-7184 [employer]. [address] [place] [address], [place] [address]. All rights reserved. This  information is not intended as a substitute for professional medical care. Always follow your healthcare professional's instructions.        Treating Ganglia  A ganglion is a swelling (cyst) that forms on joint and tendon sheaths. They are most often found in the wrist. But they can also show up on the foot, fingers, or toes. Ganglia are believed to be caused by a rupture of the tissue that lines the joints and tendon sheaths (synovial tissue).   Ganglia are often difficult to treat without surgery--but nonsurgical methods may help relieve some of your symptoms.  Nonsurgical care  Nonsurgical methods include:   · Pads placed around the ganglion can ease pressure and friction.  · Removing the fluid may also relieve symptoms, though ganglia may come back. The gelatinous gel is removed through a large-bore needle. You may get a steroid injection after the cyst fluid is removed.  · Limiting movements or activities that increase pain may bring relief.  · Icing the ganglion for 15 to 20 minutes may temporarily relieve inflammation and pain.  · If your inflammation is severe, your healthcare provider may treat your symptoms with medicine.    Surgery  If a ganglion is causing ongoing or severe pain, your healthcare provider may recommend surgery. Your surgeon removes the entire ganglion wall during the procedure. He or she may also remove some surrounding tissue. If the ganglion has come through a tear in the capsule of the joint, the tear will have to be repaired. The joint will likely be get still while the tear heals.  After surgery  You may feel pain, swelling, numbness, or tingling for several weeks after surgery. Be sure to see your healthcare provider if you notice any problems in the future. Although surgery is usually successful, there is a chance that the ganglion will come back.  Date Last Reviewed: 9/27/2015 © 2000-2016 [employer]. [address] [place] [address]. All rights reserved.  This information is not intended as a substitute for professional medical care. Always follow your healthcare professional's instructions.

## 2017-04-14 NOTE — PROGRESS NOTES
"Subjective:      Patient ID: Marco A Perez is a 52 y.o. male.    Chief Complaint: Cyst ((L) wrist )    HPI Comments: Mr. Perez presents to Urgent Care today with complaints of knot on wrist since Monday 4/10/17. He is requesting an X-ray. Denies trauma or any activity outside of his usual routine prior to onset of wrist pain and knot. He denies any previous injuries to the site. He has not tried any OTC medications or non-pharmacologic pain measures. Pain is worse with driving and with direct pressure to site. Pain is better when keeping the wrist still. He is right hand dominant.      Review of Systems   Constitutional: Negative.    HENT: Negative.    Respiratory: Negative.    Cardiovascular: Negative.    Gastrointestinal: Negative.    Musculoskeletal: Positive for arthralgias (left wrist).   Skin: Negative.    Neurological: Negative.    Hematological: Negative.        Objective:   BP (!) 136/90 (BP Location: Right arm, Patient Position: Sitting, BP Method: Automatic)  Pulse 78  Temp 97.2 °F (36.2 °C) (Tympanic)   Resp 18  Ht 5' 11" (1.803 m)  Wt 89.8 kg (197 lb 15.6 oz)  SpO2 97%  BMI 27.61 kg/m2  Physical Exam   Constitutional: He is oriented to person, place, and time. He appears well-developed and well-nourished. No distress.   HENT:   Head: Normocephalic and atraumatic.   Neck: Normal range of motion. Neck supple.   Cardiovascular: Normal rate.    Pulmonary/Chest: Effort normal. No respiratory distress.   Musculoskeletal: Normal range of motion.        Right wrist: He exhibits tenderness. He exhibits normal range of motion, no bony tenderness, no swelling, no effusion, no crepitus, no deformity and no laceration.        Arms:  Neurological: He is alert and oriented to person, place, and time.   Skin: Skin is warm and dry. No rash noted. He is not diaphoretic.   Nursing note and vitals reviewed.    Assessment:      1. Wrist pain, acute, left    2. Ganglion cyst of wrist, left       Plan:   Wrist pain, acute, " left  -     X-Ray Wrist Complete Left; Future; Expected date: 4/13/17  -     meloxicam (MOBIC) 7.5 MG tablet; Take 1 tablet (7.5 mg total) by mouth once daily.  Dispense: 30 tablet; Refill: 0    Ganglion cyst of wrist, left    Follow up with PCP for recheck of BP within one week.  Follow up with ortho or PCP if wrist pain is not improving.  Ace wrap applied. Ice pack provided.  Advised to make sure to eat with meloxicam.  Instructions, follow up, and supportive care as per AVS.

## 2017-09-05 ENCOUNTER — LAB VISIT (OUTPATIENT)
Dept: LAB | Facility: HOSPITAL | Age: 52
End: 2017-09-05
Attending: PHYSICIAN ASSISTANT
Payer: COMMERCIAL

## 2017-09-05 ENCOUNTER — OFFICE VISIT (OUTPATIENT)
Dept: INTERNAL MEDICINE | Facility: CLINIC | Age: 52
End: 2017-09-05
Payer: COMMERCIAL

## 2017-09-05 VITALS
OXYGEN SATURATION: 96 % | SYSTOLIC BLOOD PRESSURE: 132 MMHG | WEIGHT: 199.06 LBS | HEIGHT: 71 IN | TEMPERATURE: 96 F | HEART RATE: 82 BPM | BODY MASS INDEX: 27.87 KG/M2 | DIASTOLIC BLOOD PRESSURE: 86 MMHG

## 2017-09-05 DIAGNOSIS — M79.662 PAIN OF LEFT CALF: ICD-10-CM

## 2017-09-05 DIAGNOSIS — M25.532 WRIST PAIN, ACUTE, LEFT: ICD-10-CM

## 2017-09-05 DIAGNOSIS — M79.662 PAIN OF LEFT CALF: Primary | ICD-10-CM

## 2017-09-05 PROCEDURE — 84132 ASSAY OF SERUM POTASSIUM: CPT

## 2017-09-05 PROCEDURE — 3079F DIAST BP 80-89 MM HG: CPT | Mod: S$GLB,,, | Performed by: PHYSICIAN ASSISTANT

## 2017-09-05 PROCEDURE — 3075F SYST BP GE 130 - 139MM HG: CPT | Mod: S$GLB,,, | Performed by: PHYSICIAN ASSISTANT

## 2017-09-05 PROCEDURE — 3008F BODY MASS INDEX DOCD: CPT | Mod: S$GLB,,, | Performed by: PHYSICIAN ASSISTANT

## 2017-09-05 PROCEDURE — 36415 COLL VENOUS BLD VENIPUNCTURE: CPT | Mod: PO

## 2017-09-05 PROCEDURE — 99999 PR PBB SHADOW E&M-EST. PATIENT-LVL IV: CPT | Mod: PBBFAC,,, | Performed by: PHYSICIAN ASSISTANT

## 2017-09-05 PROCEDURE — 99213 OFFICE O/P EST LOW 20 MIN: CPT | Mod: S$GLB,,, | Performed by: PHYSICIAN ASSISTANT

## 2017-09-05 RX ORDER — MELOXICAM 7.5 MG/1
7.5 TABLET ORAL DAILY
Qty: 30 TABLET | Refills: 0 | Status: SHIPPED | OUTPATIENT
Start: 2017-09-05 | End: 2017-09-25

## 2017-09-05 NOTE — PROGRESS NOTES
Subjective:       Patient ID: Marco A Perez is a 52 y.o.B/ male.    Chief Complaint: Leg Pain (when sleeping)    HPI         He comes in today by himself and he has the above problem.  He feels like he is getting a charley horse about every other or every third night in his left calf.  This is been going on now for about 10-12 days.  He has had charley horses in his right calf in the past but he's had none of the past 3 months on the right side.  He denies any problem with leg pain when he is up ambulating.  He can walk a long distance without having any leg fatigue, heaviness or intense pain.  He is taking HCTZ 12.5 mg and we probably should check a potassium on him today.    Review of Systems    Otherwise negative concerning the VASCULAR, CV, RENAL, system review.    Objective:      Physical Exam    FEMORAL: The arteries are without bruit.  A palpate normally.  His popliteal arteries are 3+ and symmetrical.  His posterior tibialis is absent bilaterally.  His dorsalis pedis is 2+ and symmetrical.  Color and warmth of his leg and foot are all normal and equal.  He has a negative calf squeeze and Homans sign.  BP cuff to 200 mmHg was negative for any pain on either leg.    Assessment:       1. Pain of left calf        Plan:     1.  He should probably use NSAID of choice such as 220 mg Aleve taken to at a time every 12 hours or he can start back on his regular NSAID Mobic 7.5 mg.  2.  Also use low heat with a heating pad to the calf area.  Recheck if symptoms increase or persist over the next for 5 days.

## 2017-09-06 LAB — POTASSIUM SERPL-SCNC: 4.4 MMOL/L

## 2017-09-12 ENCOUNTER — PATIENT OUTREACH (OUTPATIENT)
Dept: ADMINISTRATIVE | Facility: HOSPITAL | Age: 52
End: 2017-09-12

## 2017-09-12 NOTE — LETTER
September 12, 2017    Marco A Perez  [address] [facility] [address]             [facility]  [address]  [facility] [address]  Phone: [phone] Dear Mr. Perez:    [facility] is committed to your overall health.  To help you get the most out of each of your visits, we will review your information to make sure you are up to date on all of your recommended tests and/or procedures.      Anders Linton MD has found that you may be due for    Colonoscopy     Influenza Vaccine         If you have had any of the above done at another facility, please bring the records or information with you so that your record at [facility] will be complete.    If you are currently taking medication, please bring it with you to your appointment for review.    We will be happy to assist you with scheduling any necessary appointments or you may contact the [facility] appointment desk at [phone] to schedule at your convenience.     Thank you for choosing [facility] for your healthcare needs,        Bobbye, LPN  Care Coordination Department  [facility]  [phone]

## 2017-09-13 ENCOUNTER — TELEPHONE (OUTPATIENT)
Dept: INTERNAL MEDICINE | Facility: CLINIC | Age: 52
End: 2017-09-13

## 2017-09-13 NOTE — TELEPHONE ENCOUNTER
----- Message from Francine Diane sent at 9/13/2017 11:35 AM CDT -----  Contact: [phone]  Pt would like test results. Please call back at [phone]. Thx. lj

## 2017-09-13 NOTE — TELEPHONE ENCOUNTER
Left voicemail for pt advising him lab results (see 09/05/17 results) we normal, and letter mailed to pt 09/11/17 (see result note documentation).

## 2017-09-19 ENCOUNTER — LAB VISIT (OUTPATIENT)
Dept: LAB | Facility: HOSPITAL | Age: 52
End: 2017-09-19
Attending: FAMILY MEDICINE
Payer: COMMERCIAL

## 2017-09-19 DIAGNOSIS — Z00.00 ROUTINE HEALTH MAINTENANCE: ICD-10-CM

## 2017-09-19 LAB
ANION GAP SERPL CALC-SCNC: 10 MMOL/L
BUN SERPL-MCNC: 15 MG/DL
CALCIUM SERPL-MCNC: 9.5 MG/DL
CHLORIDE SERPL-SCNC: 102 MMOL/L
CHOLEST SERPL-MCNC: 176 MG/DL
CHOLEST/HDLC SERPL: 4.1 {RATIO}
CO2 SERPL-SCNC: 27 MMOL/L
COMPLEXED PSA SERPL-MCNC: 0.62 NG/ML
CREAT SERPL-MCNC: 1.4 MG/DL
EST. GFR  (AFRICAN AMERICAN): >60 ML/MIN/1.73 M^2
EST. GFR  (NON AFRICAN AMERICAN): 57.8 ML/MIN/1.73 M^2
GLUCOSE SERPL-MCNC: 83 MG/DL
HDLC SERPL-MCNC: 43 MG/DL
HDLC SERPL: 24.4 %
LDLC SERPL CALC-MCNC: 109 MG/DL
NONHDLC SERPL-MCNC: 133 MG/DL
POTASSIUM SERPL-SCNC: 4.2 MMOL/L
SODIUM SERPL-SCNC: 139 MMOL/L
TRIGL SERPL-MCNC: 120 MG/DL

## 2017-09-19 PROCEDURE — 80048 BASIC METABOLIC PNL TOTAL CA: CPT

## 2017-09-19 PROCEDURE — 36415 COLL VENOUS BLD VENIPUNCTURE: CPT | Mod: PO

## 2017-09-19 PROCEDURE — 84153 ASSAY OF PSA TOTAL: CPT

## 2017-09-19 PROCEDURE — 80061 LIPID PANEL: CPT

## 2017-09-25 ENCOUNTER — OFFICE VISIT (OUTPATIENT)
Dept: INTERNAL MEDICINE | Facility: CLINIC | Age: 52
End: 2017-09-25
Payer: COMMERCIAL

## 2017-09-25 VITALS
WEIGHT: 191.38 LBS | BODY MASS INDEX: 25.92 KG/M2 | SYSTOLIC BLOOD PRESSURE: 126 MMHG | HEART RATE: 76 BPM | DIASTOLIC BLOOD PRESSURE: 86 MMHG | OXYGEN SATURATION: 98 % | TEMPERATURE: 98 F | HEIGHT: 72 IN

## 2017-09-25 DIAGNOSIS — Z00.00 ROUTINE HEALTH MAINTENANCE: Primary | ICD-10-CM

## 2017-09-25 DIAGNOSIS — R06.83 SNORING: ICD-10-CM

## 2017-09-25 DIAGNOSIS — I10 ESSENTIAL HYPERTENSION: ICD-10-CM

## 2017-09-25 DIAGNOSIS — R53.83 FATIGUE, UNSPECIFIED TYPE: ICD-10-CM

## 2017-09-25 DIAGNOSIS — Z12.11 SPECIAL SCREENING FOR MALIGNANT NEOPLASMS, COLON: ICD-10-CM

## 2017-09-25 PROCEDURE — 99999 PR PBB SHADOW E&M-EST. PATIENT-LVL III: CPT | Mod: PBBFAC,,, | Performed by: FAMILY MEDICINE

## 2017-09-25 PROCEDURE — 99396 PREV VISIT EST AGE 40-64: CPT | Mod: S$GLB,,, | Performed by: FAMILY MEDICINE

## 2017-09-25 RX ORDER — PRAVASTATIN SODIUM 40 MG/1
40 TABLET ORAL DAILY
Qty: 90 TABLET | Refills: 3 | Status: SHIPPED | OUTPATIENT
Start: 2017-09-25 | End: 2019-11-18 | Stop reason: SDUPTHER

## 2017-09-25 RX ORDER — HYDROCHLOROTHIAZIDE 12.5 MG/1
CAPSULE ORAL
Qty: 90 CAPSULE | Refills: 3 | Status: SHIPPED | OUTPATIENT
Start: 2017-09-25 | End: 2018-09-16 | Stop reason: SDUPTHER

## 2017-09-25 RX ORDER — ASPIRIN 81 MG/1
81 TABLET ORAL DAILY
COMMUNITY

## 2017-09-25 NOTE — PROGRESS NOTES
"Subjective:       Patient ID: Marco A Perez is a . male.    Chief Complaint: here for physical examination and issues  Below]    HPI Hypertension: blood pressures at home normal. Tolerating medicine; just took bit over an hour ago;*statin discussed     Migraine hx but long time since last one    Low sex drive returned. Mood ok. Some stress. Hx low T in past but last check ok    Snores/wife has noticed "gasping for air"'/some daytime sleepiness    Past Medical History   Diagnosis Date    HTN (hypertension)     Migraine      History reviewed. No pertinent past surgical history.  Family History   Problem Relation Age of Onset    Heart disease Mother     Heart disease Father     Diabetes Sister      Social History     Social History    Marital status:      Spouse name: N/A    Number of children: N/A    Years of education: N/A     Social History Main Topics    Smoking status: Never Smoker    Smokeless tobacco: None    Alcohol use No    Drug use: No    Sexual activity: Not Asked     Other Topics Concern    None     Social History Narrative     .     Review of Systems  Cardiovascular: no chest pain  Chest: no shortness of breath  Abd: no abd pain  Remainder review of systems negative    Objective:      Physical Exam   Constitutional: He is oriented to person, place, and time. He appears well-developed and well-nourished.   HENT:   Head: Normocephalic and atraumatic.   Right Ear: External ear normal.   Left Ear: External ear normal.   Nose: Nose normal.   Mouth/Throat: Oropharynx is clear and moist.   Nl tms   Eyes: Conjunctivae and EOM are normal. Pupils are equal, round, and reactive to light. No scleral icterus.   Neck: Normal range of motion. Neck supple. Carotid bruit is not present.   Cardiovascular: Normal rate, regular rhythm and normal heart sounds.  Exam reveals no gallop and no friction rub.    No murmur heard.  Pulmonary/Chest: Effort normal and breath sounds normal. He has no wheezes. "   Abdominal: Soft. Bowel sounds are normal. He exhibits no distension and no mass. There is no hepatosplenomegaly. There is no tenderness. There is no rebound and no guarding.   Musculoskeletal: Normal range of motion. He exhibits no tenderness.   Lymphadenopathy:     He has no cervical adenopathy.   Neurological: He is alert and oriented to person, place, and time. No cranial nerve deficit. Coordination normal.   Skin: Skin is warm and dry. No rash noted. No erythema.   Psychiatric: He has a normal mood and affect. His behavior is normal. Judgment and thought content normal.   Nursing note and vitals reviewed.      Assessment:       1. Routine health maintenance    2. Essential hypertension    3. Other migraine without status migrainosus, not intractable    4. Screen for colon cancer      snorking/fatigue  Plan:       **statin discussed pending lab today. If indicated will erxd; Side effects and dosing discussed  *    Lab 12 months and follow up after  Lab one yr and f/u   Also flp alt testosterone 6 weeks  Routine health maintenance  -     Lipid panel; Future; Expected date: 09/25/2018  -     PSA, Screening; Future; Expected date: 09/25/2018  -     Basic metabolic panel; Future; Expected date: 09/25/2018    Special screening for malignant neoplasms, colon  -     Case request GI: COLONOSCOPY    Essential hypertension  -     Lipid panel; Future; Expected date: 11/09/2017  -     ALT (SGPT); Future; Expected date: 12/24/2017    Fatigue, unspecified type  -     Testosterone; Future; Expected date: 09/25/2017  -     Home Sleep Studies; Future    Snoring  -     Home Sleep Studies; Future    Other orders  -     Cancel: Influenza - Quadrivalent (3 years & older)  -     pravastatin (PRAVACHOL) 40 MG tablet; Take 1 tablet (40 mg total) by mouth once daily.  Dispense: 90 tablet; Refill: 3

## 2017-10-24 RX ORDER — SODIUM, POTASSIUM,MAG SULFATES 17.5-3.13G
SOLUTION, RECONSTITUTED, ORAL ORAL
Qty: 354 ML | Refills: 0 | Status: ON HOLD | OUTPATIENT
Start: 2017-10-24 | End: 2017-11-10 | Stop reason: ALTCHOICE

## 2017-11-08 ENCOUNTER — TELEPHONE (OUTPATIENT)
Dept: INTERNAL MEDICINE | Facility: CLINIC | Age: 52
End: 2017-11-08

## 2017-11-08 NOTE — TELEPHONE ENCOUNTER
----- Message from Shawna Perez sent at 11/8/2017 11:10 AM CST -----  Contact: patient  Returning your call. Please call patient @  [phone]. Thanks, hesham

## 2017-11-10 ENCOUNTER — ANESTHESIA (OUTPATIENT)
Dept: ENDOSCOPY | Facility: HOSPITAL | Age: 52
End: 2017-11-10
Payer: COMMERCIAL

## 2017-11-10 ENCOUNTER — ANESTHESIA EVENT (OUTPATIENT)
Dept: ENDOSCOPY | Facility: HOSPITAL | Age: 52
End: 2017-11-10
Payer: COMMERCIAL

## 2017-11-10 ENCOUNTER — HOSPITAL ENCOUNTER (OUTPATIENT)
Facility: HOSPITAL | Age: 52
Discharge: HOME | End: 2017-11-10
Attending: INTERNAL MEDICINE | Admitting: INTERNAL MEDICINE
Payer: COMMERCIAL

## 2017-11-10 ENCOUNTER — SURGERY (OUTPATIENT)
Age: 52
End: 2017-11-10

## 2017-11-10 VITALS
HEART RATE: 66 BPM | HEIGHT: 72 IN | BODY MASS INDEX: 24.92 KG/M2 | DIASTOLIC BLOOD PRESSURE: 70 MMHG | SYSTOLIC BLOOD PRESSURE: 137 MMHG | RESPIRATION RATE: 18 BRPM | OXYGEN SATURATION: 100 % | WEIGHT: 184 LBS | TEMPERATURE: 98 F

## 2017-11-10 DIAGNOSIS — Z12.11 SCREENING FOR COLON CANCER: ICD-10-CM

## 2017-11-10 PROCEDURE — 63600175 PHARM REV CODE 636 W HCPCS: Performed by: NURSE ANESTHETIST, CERTIFIED REGISTERED

## 2017-11-10 PROCEDURE — 25000003 PHARM REV CODE 250: Performed by: NURSE ANESTHETIST, CERTIFIED REGISTERED

## 2017-11-10 PROCEDURE — 45380 COLONOSCOPY AND BIOPSY: CPT | Performed by: INTERNAL MEDICINE

## 2017-11-10 PROCEDURE — 45380 COLONOSCOPY AND BIOPSY: CPT | Mod: 33,,, | Performed by: INTERNAL MEDICINE

## 2017-11-10 PROCEDURE — 37000008 HC ANESTHESIA 1ST 15 MINUTES: Performed by: INTERNAL MEDICINE

## 2017-11-10 PROCEDURE — 88305 TISSUE EXAM BY PATHOLOGIST: CPT | Mod: 26,,, | Performed by: PATHOLOGY

## 2017-11-10 PROCEDURE — 37000009 HC ANESTHESIA EA ADD 15 MINS: Performed by: INTERNAL MEDICINE

## 2017-11-10 PROCEDURE — 27201012 HC FORCEPS, HOT/COLD, DISP: Performed by: INTERNAL MEDICINE

## 2017-11-10 PROCEDURE — 25000003 PHARM REV CODE 250: Performed by: INTERNAL MEDICINE

## 2017-11-10 PROCEDURE — 88305 TISSUE EXAM BY PATHOLOGIST: CPT | Performed by: PATHOLOGY

## 2017-11-10 RX ORDER — SODIUM CHLORIDE, SODIUM LACTATE, POTASSIUM CHLORIDE, CALCIUM CHLORIDE 600; 310; 30; 20 MG/100ML; MG/100ML; MG/100ML; MG/100ML
INJECTION, SOLUTION INTRAVENOUS CONTINUOUS
Status: DISCONTINUED | OUTPATIENT
Start: 2017-11-10 | End: 2017-11-10 | Stop reason: HOSPADM

## 2017-11-10 RX ORDER — LIDOCAINE HYDROCHLORIDE 10 MG/ML
INJECTION INFILTRATION; PERINEURAL
Status: DISCONTINUED | OUTPATIENT
Start: 2017-11-10 | End: 2017-11-10

## 2017-11-10 RX ORDER — PROPOFOL 10 MG/ML
VIAL (ML) INTRAVENOUS
Status: DISCONTINUED | OUTPATIENT
Start: 2017-11-10 | End: 2017-11-10

## 2017-11-10 RX ADMIN — PROPOFOL 20 MG: 10 INJECTION, EMULSION INTRAVENOUS at 02:11

## 2017-11-10 RX ADMIN — SODIUM CHLORIDE, SODIUM LACTATE, POTASSIUM CHLORIDE, AND CALCIUM CHLORIDE: .6; .31; .03; .02 INJECTION, SOLUTION INTRAVENOUS at 02:11

## 2017-11-10 RX ADMIN — LIDOCAINE HYDROCHLORIDE 50 MG: 10 INJECTION, SOLUTION INFILTRATION; PERINEURAL at 02:11

## 2017-11-10 RX ADMIN — PROPOFOL 70 MG: 10 INJECTION, EMULSION INTRAVENOUS at 02:11

## 2017-11-10 NOTE — ANESTHESIA PREPROCEDURE EVALUATION
11/10/2017  Marco A Perez is a 52 y.o., male.    Anesthesia Evaluation    I have reviewed the Patient Summary Reports.    I have reviewed the Nursing Notes.   I have reviewed the Medications.     Review of Systems  Anesthesia Hx:  No problems with previous Anesthesia  Denies Family Hx of Anesthesia complications.   Denies Personal Hx of Anesthesia complications.   Social:  No Alcohol Use, Non-Smoker    Hematology/Oncology:  Hematology Normal   Oncology Normal     Cardiovascular:   Hypertension Denies MI.   Denies CABG/stent.         Pulmonary:   Denies COPD.  Denies Asthma.  Denies Sleep Apnea.    Renal/:  Renal/ Normal     Hepatic/GI:   Bowel Prep. Denies GERD. Denies Liver Disease.  Denies Hepatitis.    Musculoskeletal:  Musculoskeletal Normal    Neurological:   Denies CVA. Headaches Denies Seizures.    Endocrine:  Endocrine Normal        Physical Exam  General:  Well nourished    Airway/Jaw/Neck:  Airway Findings: Mouth Opening: Normal Tongue: Normal  General Airway Assessment: Adult  Mallampati: II      Dental:  Dental Findings: In tact   Chest/Lungs:  Chest/Lungs Findings: Clear to auscultation, Normal Respiratory Rate     Heart/Vascular:  Heart Findings: Rate: Normal  Rhythm: Regular Rhythm  Sounds: Normal             Anesthesia Plan  Type of Anesthesia, risks & benefits discussed:  Anesthesia Type:  MAC  Patient's Preference:   Intra-op Monitoring Plan: standard ASA monitors  Intra-op Monitoring Plan Comments:   Post Op Pain Control Plan:   Post Op Pain Control Plan Comments:   Induction:   IV  Beta Blocker:  Patient is not currently on a Beta-Blocker (No further documentation required).       Informed Consent: Patient understands risks and agrees with Anesthesia plan.  Questions answered. Anesthesia consent signed with patient.  ASA Score: 2     Day of Surgery Review of History & Physical: I have  interviewed and examined the patient. I have reviewed the patient's H&P dated:  There are no significant changes.  H&P update referred to the surgeon.         Ready For Surgery From Anesthesia Perspective.        Eloped (saw a physician/midlevel provider but left without telling anyone)

## 2017-11-10 NOTE — ANESTHESIA RELEASE NOTE
Anesthesia Release from PACU Note    Patient: Marco A Perez    Procedure(s) Performed: Procedure(s) (LRB):  COLONOSCOPY (N/A)    Anesthesia type: MAC    Post pain: Adequate analgesia    Post assessment: no apparent anesthetic complications, tolerated procedure well and no evidence of recall    Last Vitals:   Visit Vitals  BP (!) 138/99 (BP Location: Left arm, Patient Position: Sitting)   Pulse 63   Temp 36.4 °C (97.6 °F) (Oral)   Resp 16   Ht 6' (1.829 m)   Wt 83.5 kg (184 lb)   SpO2 99%   BMI 24.95 kg/m²       Post vital signs: stable    Level of consciousness: awake    Nausea/Vomiting: no nausea/no vomiting    Complications: none    Airway Patency: patent    Respiratory: unassisted, spontaneous ventilation, room air    Cardiovascular: stable and blood pressure at baseline    Hydration: euvolemic

## 2017-11-10 NOTE — TRANSFER OF CARE
Anesthesia Transfer of Care Note    Patient: Marco A Perez    Procedure(s) Performed: Procedure(s) (LRB):  COLONOSCOPY (N/A)    Patient location: PACU    Anesthesia Type: MAC    Transport from OR: Transported from OR on room air with adequate spontaneous ventilation    Post pain: adequate analgesia    Post assessment: no apparent anesthetic complications and tolerated procedure well    Post vital signs: stable    Level of consciousness: awake    Nausea/Vomiting: no nausea/vomiting    Complications: none    Transfer of care protocol was followed      Last vitals:   Visit Vitals  BP (!) 138/99 (BP Location: Left arm, Patient Position: Sitting)   Pulse 63   Temp 36.4 °C (97.6 °F) (Oral)   Resp 16   Ht 6' (1.829 m)   Wt 83.5 kg (184 lb)   SpO2 99%   BMI 24.95 kg/m²

## 2017-11-10 NOTE — H&P
Short Stay Endoscopy History and Physical    PCP - Anders Linton MD    Procedure - Colonoscopy  ASA - 2  Mallampati - per anesthesia  History of Anesthesia problems - no  Family history Anesthesia problems -  no     HPI:  This is a 52 y.o. male here for evaluation of :     Average Risk Screening: yes  High risk screening: No  History of polyps: No  Anemia: No  Blood in stools: No  Diarrhea: No  Abdominal Pain: No    Review of Systems:  CONSTITUTIONAL: Denies weight change,  fatigue, fevers, chills, night sweats.  CARDIOVASCULAR: Denies chest pain, shortness of breath, orthopnea and edema.  RESPIRATORY: Denies cough, hemoptysis, dyspnea, and wheezing.  GI: See HPI.    Medical History:  Past Medical History:   Diagnosis Date    HTN (hypertension)     Migraine        Surgical History:   History reviewed. No pertinent surgical history.    Family History:   Family History   Problem Relation Age of Onset    Heart disease Mother     Heart disease Father     Diabetes Sister        Social History:   Social History   Substance Use Topics    Smoking status: Never Smoker    Smokeless tobacco: Never Used    Alcohol use No       Allergies: Reviewed.    Medications:  No current facility-administered medications on file prior to encounter.      Current Outpatient Prescriptions on File Prior to Encounter   Medication Sig Dispense Refill    hydrochlorothiazide (MICROZIDE) 12.5 mg capsule TAKE ONE CAPSULE BY MOUTH ONCE DAILY 90 capsule 3    omeprazole (PRILOSEC) 40 MG capsule Take 1 capsule (40 mg total) by mouth once daily. 30 capsule 0    pravastatin (PRAVACHOL) 40 MG tablet Take 1 tablet (40 mg total) by mouth once daily. 90 tablet 3    aspirin (ECOTRIN) 81 MG EC tablet Take 81 mg by mouth once daily.      butalbital-acetaminophen-caffeine -40 mg (FIORICET, ESGIC) -40 mg per tablet TAKE ONE TO TWO TABLETS BY MOUTH EVERY 4 HOURS AS NEEDED 30 tablet 3       Physical Exam:  Vital Signs:   Vitals:     11/10/17 1352   BP: (!) 138/99   Pulse: 63   Resp: 16   Temp: 97.6 °F (36.4 °C)     General Appearance: Well appearing in no acute distress  ENT: OP clear  Chest: CTA B  CV: RRR, no m/r/g  Abd: s/nt/nd/nabs  Ext: no edema    Labs:  Reviewed    Impression: Screening    Plan:  I have explained the risks and benefits of colonoscopy to the patient including but not limited to bleeding, perforation, infection, and death. The patient wishes to proceed with colonoscopy.

## 2017-11-10 NOTE — DISCHARGE SUMMARY
[facility] -   Brief Operative Note     SUMMARY     Surgery Date: 11/10/2017     Surgeon(s) and Role:     * Reed Penny MD - Primary    Assisting Surgeon: None    Pre-op Diagnosis:  Special screening for malignant neoplasms, colon [Z12.11]    Post-op Diagnosis:  Post-Op Diagnosis Codes:     * Special screening for malignant neoplasms, colon [Z12.11]    Procedure(s) (LRB):  COLONOSCOPY (N/A)    Anesthesia: Choice    Description of the findings of the procedure: Procedure completed. See Procedure note for details.     Findings/Key Components: Procedure completed. See Procedure note for details.     Prosthesis/Implants: None    Estimated Blood Loss: less than 10         Specimens:   Specimen (12h ago through future)    Start     Ordered    11/10/17 1444  Specimen to Pathology - Surgery  Once     Comments:  1. Rectal polyp      11/10/17 1444          Discharge Note    SUMMARY     Admit Date: 11/10/2017    Discharge Date and Time:  11/10/2017 2:47 PM    Hospital Course (synopsis of major diagnoses, care, treatment, and services provided during the course of the hospital stay): Procedure completed. See Procedure note for details.      Final Diagnosis: Post-Op Diagnosis Codes:     * Special screening for malignant neoplasms, colon [Z12.11]    Disposition: Home or Self Care    Follow Up/Patient Instructions:     Medications:  Reconciled Home Medications:   Current Discharge Medication List      CONTINUE these medications which have NOT CHANGED    Details   hydrochlorothiazide (MICROZIDE) 12.5 mg capsule TAKE ONE CAPSULE BY MOUTH ONCE DAILY  Qty: 90 capsule, Refills: 3      omeprazole (PRILOSEC) 40 MG capsule Take 1 capsule (40 mg total) by mouth once daily.  Qty: 30 capsule, Refills: 0      pravastatin (PRAVACHOL) 40 MG tablet Take 1 tablet (40 mg total) by mouth once daily.  Qty: 90 tablet, Refills: 3      aspirin (ECOTRIN) 81 MG EC tablet Take 81 mg by mouth once daily.       butalbital-acetaminophen-caffeine -40 mg (FIORICET, ESGIC) -40 mg per tablet TAKE ONE TO TWO TABLETS BY MOUTH EVERY 4 HOURS AS NEEDED  Qty: 30 tablet, Refills: 3             Discharge Procedure Orders  Diet general     Activity as tolerated       Follow-up Information     Anders Linton MD.    Specialty:  Family Medicine  Contact information:  [address]  [place] [address] [phone]

## 2017-11-10 NOTE — PLAN OF CARE
Dr Penny came to bedside and discussed findings. NO N/V,  no abdominal pain, no GI bleeding, and vitals stable.  Pt discharged from unit.

## 2017-11-10 NOTE — ANESTHESIA POSTPROCEDURE EVALUATION
Anesthesia Post Evaluation    Patient: Marco A Perez    Procedure(s) Performed: Procedure(s) (LRB):  COLONOSCOPY (N/A)    Final Anesthesia Type: MAC  Patient location during evaluation: PACU  Patient participation: Yes- Able to Participate  Level of consciousness: awake  Post-procedure vital signs: reviewed and stable  Pain management: adequate  Airway patency: patent  PONV status at discharge: No PONV  Anesthetic complications: no      Cardiovascular status: blood pressure returned to baseline and hemodynamically stable  Respiratory status: unassisted, room air and spontaneous ventilation  Hydration status: euvolemic  Follow-up not needed.        Visit Vitals  BP (!) 138/99 (BP Location: Left arm, Patient Position: Sitting)   Pulse 63   Temp 36.4 °C (97.6 °F) (Oral)   Resp 16   Ht 6' (1.829 m)   Wt 83.5 kg (184 lb)   SpO2 99%   BMI 24.95 kg/m²       Pain/Ainsley Score: Pain Assessment Performed: Yes (11/10/2017  1:46 PM)  Presence of Pain: denies (11/10/2017  1:46 PM)

## 2017-11-10 NOTE — DISCHARGE INSTRUCTIONS

## 2017-11-14 ENCOUNTER — TELEPHONE (OUTPATIENT)
Dept: INTERNAL MEDICINE | Facility: CLINIC | Age: 52
End: 2017-11-14

## 2017-11-14 ENCOUNTER — OFFICE VISIT (OUTPATIENT)
Dept: SLEEP MEDICINE | Facility: CLINIC | Age: 52
End: 2017-11-14
Payer: COMMERCIAL

## 2017-11-14 DIAGNOSIS — G47.33 OSA (OBSTRUCTIVE SLEEP APNEA): Primary | ICD-10-CM

## 2017-11-14 DIAGNOSIS — R53.83 FATIGUE, UNSPECIFIED TYPE: ICD-10-CM

## 2017-11-14 DIAGNOSIS — G47.33 OSA (OBSTRUCTIVE SLEEP APNEA): ICD-10-CM

## 2017-11-14 PROCEDURE — 99999 PR PBB SHADOW E&M-EST. PATIENT-LVL II: CPT | Mod: PBBFAC,,,

## 2017-11-14 PROCEDURE — 95806 SLEEP STUDY UNATT&RESP EFFT: CPT | Mod: S$GLB,,, | Performed by: INTERNAL MEDICINE

## 2017-11-14 PROCEDURE — 99499 UNLISTED E&M SERVICE: CPT | Mod: S$GLB,,, | Performed by: INTERNAL MEDICINE

## 2017-11-14 NOTE — Clinical Note
Assessment and Recommendations Device collected data for 4 hours 3 minutes. A very large amount of respiratory signal was excluded 230 minutes. Lowest oxygen saturation was 82%. Average heart rate was 66 bpm with a maximal heart rate of 86 bpm. Snoring was recorded above 50 dB 100% of the time. Apnea-hypopnea index: 15.8 events per hour. Total events 64 Obstructive sleep apnea-hypopnea syndrome detected. Treatment recommendations: CPAP would be the guideline recommendation for first-line treatment for obstructive sleep apnea. Either order in lab CPAP titration or AutoPap device. Referal for evaluation and treatment in the Ambulatory sleep disorder clinic. Other modalities for treatment of obstructive sleep apnea may be explored in patients with intolerant to CPAP including evaluation by ear nose and throat, mandibular advancement device, nonsupine sleep positioning device. Close follow-up to ensure resolution of symptoms

## 2017-12-27 RX ORDER — BUTALBITAL, ACETAMINOPHEN AND CAFFEINE 50; 325; 40 MG/1; MG/1; MG/1
TABLET ORAL
Qty: 30 TABLET | Refills: 3 | Status: SHIPPED | OUTPATIENT
Start: 2017-12-27 | End: 2019-10-31 | Stop reason: SDUPTHER

## 2018-01-22 ENCOUNTER — OFFICE VISIT (OUTPATIENT)
Dept: INTERNAL MEDICINE | Facility: CLINIC | Age: 53
End: 2018-01-22
Payer: COMMERCIAL

## 2018-01-22 ENCOUNTER — APPOINTMENT (OUTPATIENT)
Dept: RADIOLOGY | Facility: HOSPITAL | Age: 53
End: 2018-01-22
Attending: INTERNAL MEDICINE
Payer: COMMERCIAL

## 2018-01-22 VITALS
WEIGHT: 189.63 LBS | HEIGHT: 71 IN | RESPIRATION RATE: 20 BRPM | SYSTOLIC BLOOD PRESSURE: 138 MMHG | BODY MASS INDEX: 26.55 KG/M2 | HEART RATE: 87 BPM | OXYGEN SATURATION: 97 % | DIASTOLIC BLOOD PRESSURE: 92 MMHG | TEMPERATURE: 97 F

## 2018-01-22 DIAGNOSIS — R10.9 RIGHT FLANK PAIN: ICD-10-CM

## 2018-01-22 DIAGNOSIS — R10.9 RIGHT FLANK PAIN: Primary | ICD-10-CM

## 2018-01-22 DIAGNOSIS — R20.2 RIGHT HAND PARESTHESIA: ICD-10-CM

## 2018-01-22 LAB
BILIRUB SERPL-MCNC: NEGATIVE MG/DL
BLOOD, POC UA: NEGATIVE
GLUCOSE UR QL STRIP: NORMAL
KETONES UR QL STRIP: NEGATIVE
LEUKOCYTE ESTERASE URINE, POC: NEGATIVE
NITRITE, POC UA: NEGATIVE
PH, POC UA: 7
PROTEIN, POC: NEGATIVE
SPECIFIC GRAVITY, POC UA: 1
UROBILINOGEN, POC UA: NORMAL

## 2018-01-22 PROCEDURE — 99999 PR PBB SHADOW E&M-EST. PATIENT-LVL III: CPT | Mod: PBBFAC,,, | Performed by: INTERNAL MEDICINE

## 2018-01-22 PROCEDURE — 81000 URINALYSIS NONAUTO W/SCOPE: CPT | Mod: S$GLB,,, | Performed by: INTERNAL MEDICINE

## 2018-01-22 PROCEDURE — 74018 RADEX ABDOMEN 1 VIEW: CPT | Mod: 26,,, | Performed by: RADIOLOGY

## 2018-01-22 PROCEDURE — 99214 OFFICE O/P EST MOD 30 MIN: CPT | Mod: 25,S$GLB,, | Performed by: INTERNAL MEDICINE

## 2018-01-22 PROCEDURE — 74018 RADEX ABDOMEN 1 VIEW: CPT | Mod: TC,PO

## 2018-01-22 NOTE — PROGRESS NOTES
Subjective:      Patient ID: Marco A Perez is a 52 y.o. male.    Chief Complaint: Flank Pain ((R)**)      HPI     Mr. Marco A Perez is a patient of Anders Linton MD, who presents for right flank pain since Saturday. He denies any lifting or trauma to the area or falls. No blood in urine. No dysuria. No urinary frequency. No nocturia. No h/o kidney stones. No UTIs in the past. No cough out of the ordinary. No h/o gallbladder or liver dz. He reports his R flank/back pain only hurts when rotating at the torso to the right or standing straight up following bending down to tie his shoes. He has not taken any rx for this.     He also reports right pinky finger tingling over the past 1 week, which is exacerbated by pressing on his right medial elbow.     Past Medical History:   Diagnosis Date    HTN (hypertension)     Migraine      Past Surgical History:   Procedure Laterality Date    COLONOSCOPY N/A 11/10/2017    Procedure: COLONOSCOPY;  Surgeon: Reed Penny MD;  Location: [facility];  Service: Endoscopy;  Laterality: N/A;     Social History     Social History    Marital status:      Spouse name: N/A    Number of children: N/A    Years of education: N/A     Occupational History    Not on file.     Social History Main Topics    Smoking status: Never Smoker    Smokeless tobacco: Never Used    Alcohol use No    Drug use: No    Sexual activity: Not on file     Other Topics Concern    Not on file     Social History Narrative    No narrative on file     Family History   Problem Relation Age of Onset    Heart disease Mother     Heart disease Father     Diabetes Sister        Current Outpatient Prescriptions:     aspirin (ECOTRIN) 81 MG EC tablet, Take 81 mg by mouth once daily., Disp: , Rfl:     butalbital-acetaminophen-caffeine -40 mg (FIORICET, ESGIC) -40 mg per tablet, TAKE ONE TO TWO TABLETS BY MOUTH EVERY 4 HOURS AS NEEDED, Disp: 30 tablet, Rfl: 3    hydrochlorothiazide  "(MICROZIDE) 12.5 mg capsule, TAKE ONE CAPSULE BY MOUTH ONCE DAILY, Disp: 90 capsule, Rfl: 3    omeprazole (PRILOSEC) 40 MG capsule, Take 1 capsule (40 mg total) by mouth once daily., Disp: 30 capsule, Rfl: 0    pravastatin (PRAVACHOL) 40 MG tablet, Take 1 tablet (40 mg total) by mouth once daily., Disp: 90 tablet, Rfl: 3    Review of patient's allergies indicates:  No Known Allergies     Review of Systems   Negative    Objective:     BP (!) 138/92 (BP Location: Left arm, Patient Position: Sitting, BP Method: Medium (Automatic))   Pulse 87   Temp 96.9 °F (36.1 °C) (Tympanic)   Resp 20   Ht 5' 11" (1.803 m)   Wt 86 kg (189 lb 9.5 oz)   SpO2 97%   BMI 26.44 kg/m²     Physical Exam  GEN: A&O fully, NAD  PSYC: Normal affect  MSK: No muscle spasm, no TTP of right flank  NEURO: Right 5th digit numbness and tingling    Lab Results   Component Value Date    WBC 6.36 02/09/2015    HGB 13.6 (L) 02/09/2015    HCT 42.2 02/09/2015     02/09/2015    CHOL 176 09/19/2017    TRIG 120 09/19/2017    HDL 43 09/19/2017    LDLCALC 109.0 09/19/2017    ALT 16 09/23/2016    AST 12 09/10/2009     09/19/2017    K 4.2 09/19/2017     09/19/2017    CREATININE 1.4 09/19/2017    BUN 15 09/19/2017    CO2 27 09/19/2017    TSH 1.971 12/03/2012    PSA 0.62 09/19/2017       Assessment:      1. Right flank pain: Likely MSK due to strain. DDx includes nephrolithiasis, UTI, which we will r/o with u/a and KUB. Recommended continue heat pads on the right back and Tylenol 650 mg po 1 TID prn. Unlikely liver dz given recent normal LFTs and he is not a drinker.    2. Right hand paresthesia: Likely 2/2 the way he places his right elbow on his car's console, which I recommend he place a pad on.       Plan:   Right flank pain  -     X-Ray Abdomen AP 1 View; Future; Expected date: 01/22/2018  -     POCT Urinalysis    Right hand paresthesia  -     X-Ray Cervical Spine AP And Lateral; Future; Expected date: 01/22/2018        Follow-up if " symptoms worsen or fail to improve.

## 2018-08-20 ENCOUNTER — TELEPHONE (OUTPATIENT)
Dept: INTERNAL MEDICINE | Facility: CLINIC | Age: 53
End: 2018-08-20

## 2018-08-20 ENCOUNTER — OFFICE VISIT (OUTPATIENT)
Dept: INTERNAL MEDICINE | Facility: CLINIC | Age: 53
End: 2018-08-20
Payer: COMMERCIAL

## 2018-08-20 VITALS
HEIGHT: 71 IN | OXYGEN SATURATION: 98 % | SYSTOLIC BLOOD PRESSURE: 128 MMHG | RESPIRATION RATE: 18 BRPM | WEIGHT: 182.75 LBS | DIASTOLIC BLOOD PRESSURE: 88 MMHG | BODY MASS INDEX: 25.58 KG/M2 | TEMPERATURE: 98 F | HEART RATE: 83 BPM

## 2018-08-20 DIAGNOSIS — M54.32 LEFT SIDED SCIATICA: Primary | ICD-10-CM

## 2018-08-20 PROCEDURE — 99213 OFFICE O/P EST LOW 20 MIN: CPT | Mod: S$GLB,,, | Performed by: INTERNAL MEDICINE

## 2018-08-20 PROCEDURE — 3079F DIAST BP 80-89 MM HG: CPT | Mod: CPTII,S$GLB,, | Performed by: INTERNAL MEDICINE

## 2018-08-20 PROCEDURE — 3074F SYST BP LT 130 MM HG: CPT | Mod: CPTII,S$GLB,, | Performed by: INTERNAL MEDICINE

## 2018-08-20 PROCEDURE — 99999 PR PBB SHADOW E&M-EST. PATIENT-LVL III: CPT | Mod: PBBFAC,,, | Performed by: INTERNAL MEDICINE

## 2018-08-20 PROCEDURE — 3008F BODY MASS INDEX DOCD: CPT | Mod: CPTII,S$GLB,, | Performed by: INTERNAL MEDICINE

## 2018-08-20 RX ORDER — GUAIFENESIN AND PHENYLEPHRINE HCL 400; 10 MG/1; MG/1
1000 TABLET ORAL DAILY PRN
Qty: 60 CAPSULE | Refills: 3 | Status: SHIPPED | OUTPATIENT
Start: 2018-08-20 | End: 2020-05-04

## 2018-08-20 NOTE — TELEPHONE ENCOUNTER
----- Message from Francine Escalante sent at 8/20/2018  3:14 PM CDT -----  Contact: Patient  Patient called to speak with the nurse; he stated he was seen today but he didn't get his prescription and nothing was called in for him neither and it was for his Lower Back Pain. He can be contacted at [phone].    Thanks,  Francine

## 2018-08-20 NOTE — TELEPHONE ENCOUNTER
Dear Luna,    Please let pt know I've put in a prescription for turmeric, which may not be covered by insurance since it is OTC.     THanks!

## 2018-08-20 NOTE — PROGRESS NOTES
"Subjective:      Patient ID: Marco A Perez is a 53 y.o. male.    Chief Complaint: No chief complaint on file.      HPI     Mr. Marco A Perez is a patient of Anders Linton MD, who presents for intermittent LBP over the past 2 months, which seems to be worsening, located in the left LBP radiating down his left leg, 10/10 intensity on every morning, which improves with laying on his right side. He has tried ibuprofen, OTC "backache", which didn't help significantly. He has tried leg crossing, bending and tennis ball on the hip has also helped partially. No falls or trauma to the area. No MVAs.       Past Medical History:   Diagnosis Date    HTN (hypertension)     Migraine      History reviewed. No pertinent surgical history.  Social History     Socioeconomic History    Marital status:      Spouse name: Not on file    Number of children: Not on file    Years of education: Not on file    Highest education level: Not on file   Social Needs    Financial resource strain: Not on file    Food insecurity - worry: Not on file    Food insecurity - inability: Not on file    Transportation needs - medical: Not on file    Transportation needs - non-medical: Not on file   Occupational History    Not on file   Tobacco Use    Smoking status: Never Smoker    Smokeless tobacco: Never Used   Substance and Sexual Activity    Alcohol use: No    Drug use: No    Sexual activity: Not on file   Other Topics Concern    Not on file   Social History Narrative    Not on file     Family History   Problem Relation Age of Onset    Heart disease Mother     Heart disease Father     Diabetes Sister        Current Outpatient Medications:     aspirin (ECOTRIN) 81 MG EC tablet, Take 81 mg by mouth once daily., Disp: , Rfl:     butalbital-acetaminophen-caffeine -40 mg (FIORICET, ESGIC) -40 mg per tablet, TAKE ONE TO TWO TABLETS BY MOUTH EVERY 4 HOURS AS NEEDED, Disp: 30 tablet, Rfl: 3    hydrochlorothiazide " "(MICROZIDE) 12.5 mg capsule, TAKE ONE CAPSULE BY MOUTH ONCE DAILY, Disp: 90 capsule, Rfl: 3    pravastatin (PRAVACHOL) 40 MG tablet, Take 1 tablet (40 mg total) by mouth once daily., Disp: 90 tablet, Rfl: 3    omeprazole (PRILOSEC) 40 MG capsule, Take 1 capsule (40 mg total) by mouth once daily., Disp: 30 capsule, Rfl: 0    Review of patient's allergies indicates:  No Known Allergies     Review of Systems   All remaining systems negative    Objective:     /88 (BP Location: Left arm, Patient Position: Sitting, BP Method: Large (Manual))   Pulse 83   Temp 97.7 °F (36.5 °C) (Tympanic)   Resp 18   Ht 5' 11" (1.803 m)   Wt 82.9 kg (182 lb 12.2 oz)   SpO2 98%   BMI 25.49 kg/m²     Physical Exam  GEN: A&O fully, NAD  PSYC: Normal affect  NEURO: +stright leg raise on left; right WNL      Lab Results   Component Value Date    WBC 6.36 02/09/2015    HGB 13.6 (L) 02/09/2015    HCT 42.2 02/09/2015     02/09/2015    CHOL 176 09/19/2017    TRIG 120 09/19/2017    HDL 43 09/19/2017    LDLCALC 109.0 09/19/2017    ALT 16 09/23/2016    AST 12 09/10/2009     09/19/2017    K 4.2 09/19/2017     09/19/2017    CREATININE 1.4 09/19/2017    BUN 15 09/19/2017    CO2 27 09/19/2017    CALCIUM 9.5 09/19/2017    PSA 0.62 09/19/2017       Assessment:      1. Left sided sciatica: Risks and benefits discussed and patient chose to move forward with taking cyclobenzaprine (which was previously prescribed) and turmeric 500 mg 2 caps daily. If not sufficient, he will start PT and understands to f/u with PCP for possible gabapentin if all of the above not effective.        Plan:   Left sided sciatica  -     Ambulatory consult to Physical Therapy        Follow-up if symptoms worsen or fail to improve.    "

## 2018-09-17 RX ORDER — HYDROCHLOROTHIAZIDE 12.5 MG/1
CAPSULE ORAL
Qty: 90 CAPSULE | Refills: 3 | Status: SHIPPED | OUTPATIENT
Start: 2018-09-17 | End: 2019-03-05

## 2018-09-17 NOTE — TELEPHONE ENCOUNTER
----- Message from Kylee Maldonado sent at 9/17/2018  8:49 AM CDT -----  Contact: Marco A [phone]  Patient is currently out of blood pressure medication and needs a refill. Please call it into [facility] in [place].

## 2018-09-20 ENCOUNTER — LAB VISIT (OUTPATIENT)
Dept: LAB | Facility: HOSPITAL | Age: 53
End: 2018-09-20
Attending: FAMILY MEDICINE
Payer: COMMERCIAL

## 2018-09-20 DIAGNOSIS — I10 ESSENTIAL HYPERTENSION: ICD-10-CM

## 2018-09-20 DIAGNOSIS — Z00.00 ROUTINE HEALTH MAINTENANCE: ICD-10-CM

## 2018-09-20 DIAGNOSIS — R53.83 FATIGUE, UNSPECIFIED TYPE: ICD-10-CM

## 2018-09-20 LAB
ALT SERPL W/O P-5'-P-CCNC: 14 U/L
ANION GAP SERPL CALC-SCNC: 7 MMOL/L
BUN SERPL-MCNC: 14 MG/DL
CALCIUM SERPL-MCNC: 9.9 MG/DL
CHLORIDE SERPL-SCNC: 105 MMOL/L
CHOLEST SERPL-MCNC: 169 MG/DL
CHOLEST/HDLC SERPL: 4 {RATIO}
CO2 SERPL-SCNC: 31 MMOL/L
COMPLEXED PSA SERPL-MCNC: 7.3 NG/ML
CREAT SERPL-MCNC: 1.4 MG/DL
EST. GFR  (AFRICAN AMERICAN): >60 ML/MIN/1.73 M^2
EST. GFR  (NON AFRICAN AMERICAN): 57.4 ML/MIN/1.73 M^2
GLUCOSE SERPL-MCNC: 88 MG/DL
HDLC SERPL-MCNC: 42 MG/DL
HDLC SERPL: 24.9 %
LDLC SERPL CALC-MCNC: 105.2 MG/DL
NONHDLC SERPL-MCNC: 127 MG/DL
POTASSIUM SERPL-SCNC: 4.1 MMOL/L
SODIUM SERPL-SCNC: 143 MMOL/L
TESTOST SERPL-MCNC: 379 NG/DL
TRIGL SERPL-MCNC: 109 MG/DL

## 2018-09-20 PROCEDURE — 84403 ASSAY OF TOTAL TESTOSTERONE: CPT

## 2018-09-20 PROCEDURE — 84153 ASSAY OF PSA TOTAL: CPT

## 2018-09-20 PROCEDURE — 80048 BASIC METABOLIC PNL TOTAL CA: CPT

## 2018-09-20 PROCEDURE — 84460 ALANINE AMINO (ALT) (SGPT): CPT

## 2018-09-20 PROCEDURE — 36415 COLL VENOUS BLD VENIPUNCTURE: CPT | Mod: PO

## 2018-09-20 PROCEDURE — 80061 LIPID PANEL: CPT

## 2018-09-21 ENCOUNTER — TELEPHONE (OUTPATIENT)
Dept: INTERNAL MEDICINE | Facility: CLINIC | Age: 53
End: 2018-09-21

## 2018-09-21 DIAGNOSIS — R97.20 ELEVATED PSA: Primary | ICD-10-CM

## 2018-09-21 NOTE — TELEPHONE ENCOUNTER
----- Message from Yolande Rodarte sent at 9/21/2018  2:19 PM CDT -----  Contact: pt  States he's returning a call regarding his test results. Please call pt at [phone]. Thank you

## 2019-03-05 ENCOUNTER — CLINICAL SUPPORT (OUTPATIENT)
Dept: CARDIOLOGY | Facility: CLINIC | Age: 54
End: 2019-03-05
Payer: COMMERCIAL

## 2019-03-05 ENCOUNTER — OFFICE VISIT (OUTPATIENT)
Dept: CARDIOLOGY | Facility: CLINIC | Age: 54
End: 2019-03-05
Payer: COMMERCIAL

## 2019-03-05 VITALS
DIASTOLIC BLOOD PRESSURE: 90 MMHG | BODY MASS INDEX: 27.04 KG/M2 | HEART RATE: 74 BPM | WEIGHT: 193.13 LBS | HEIGHT: 71 IN | SYSTOLIC BLOOD PRESSURE: 150 MMHG

## 2019-03-05 DIAGNOSIS — I10 ESSENTIAL HYPERTENSION: Primary | ICD-10-CM

## 2019-03-05 DIAGNOSIS — E78.49 OTHER HYPERLIPIDEMIA: ICD-10-CM

## 2019-03-05 DIAGNOSIS — I10 ESSENTIAL HYPERTENSION: ICD-10-CM

## 2019-03-05 DIAGNOSIS — G47.33 OSA (OBSTRUCTIVE SLEEP APNEA): ICD-10-CM

## 2019-03-05 PROCEDURE — 3080F DIAST BP >= 90 MM HG: CPT | Mod: CPTII,S$GLB,, | Performed by: INTERNAL MEDICINE

## 2019-03-05 PROCEDURE — 93000 ELECTROCARDIOGRAM COMPLETE: CPT | Mod: S$GLB,,, | Performed by: INTERNAL MEDICINE

## 2019-03-05 PROCEDURE — 3008F PR BODY MASS INDEX (BMI) DOCUMENTED: ICD-10-PCS | Mod: CPTII,S$GLB,, | Performed by: INTERNAL MEDICINE

## 2019-03-05 PROCEDURE — 3080F PR MOST RECENT DIASTOLIC BLOOD PRESSURE >= 90 MM HG: ICD-10-PCS | Mod: CPTII,S$GLB,, | Performed by: INTERNAL MEDICINE

## 2019-03-05 PROCEDURE — 99999 PR PBB SHADOW E&M-EST. PATIENT-LVL III: ICD-10-PCS | Mod: PBBFAC,,, | Performed by: INTERNAL MEDICINE

## 2019-03-05 PROCEDURE — 3077F PR MOST RECENT SYSTOLIC BLOOD PRESSURE >= 140 MM HG: ICD-10-PCS | Mod: CPTII,S$GLB,, | Performed by: INTERNAL MEDICINE

## 2019-03-05 PROCEDURE — 3008F BODY MASS INDEX DOCD: CPT | Mod: CPTII,S$GLB,, | Performed by: INTERNAL MEDICINE

## 2019-03-05 PROCEDURE — 3077F SYST BP >= 140 MM HG: CPT | Mod: CPTII,S$GLB,, | Performed by: INTERNAL MEDICINE

## 2019-03-05 PROCEDURE — 99204 PR OFFICE/OUTPT VISIT, NEW, LEVL IV, 45-59 MIN: ICD-10-PCS | Mod: S$GLB,,, | Performed by: INTERNAL MEDICINE

## 2019-03-05 PROCEDURE — 99999 PR PBB SHADOW E&M-EST. PATIENT-LVL III: CPT | Mod: PBBFAC,,, | Performed by: INTERNAL MEDICINE

## 2019-03-05 PROCEDURE — 99204 OFFICE O/P NEW MOD 45 MIN: CPT | Mod: S$GLB,,, | Performed by: INTERNAL MEDICINE

## 2019-03-05 PROCEDURE — 93000 EKG 12-LEAD: ICD-10-PCS | Mod: S$GLB,,, | Performed by: INTERNAL MEDICINE

## 2019-03-05 RX ORDER — HYDROCHLOROTHIAZIDE 25 MG/1
25 TABLET ORAL DAILY
Qty: 90 TABLET | Refills: 1 | Status: SHIPPED | OUTPATIENT
Start: 2019-03-05 | End: 2019-09-19 | Stop reason: SDUPTHER

## 2019-03-05 NOTE — PATIENT INSTRUCTIONS
Low-Salt Diet  This diet removes foods that are high in salt. It also limits the amount of salt you use when cooking. It is most often used for people with high blood pressure, edema (fluid retention), and kidney, liver, or heart disease.  Table salt contains the mineral sodium. Your body needs sodium to work normally. But too much sodium can make your health problems worse. Your healthcare provider is recommending a low-salt (also called low-sodium) diet for you. Your total daily allowance of salt is 1,500 to 2,300 milligrams (mg). It is less than 1 teaspoon of table salt. This means you can have only about 500 to 700 mg of sodium at each meal. People with certain health problems should limit salt intake to the lower end of the recommended range.    When you cook, dont add much salt. If you can cook without using salt, even better. Dont add salt to your food at the table.  When shopping, read food labels. Salt is often called sodium on the label. Choose foods that are salt-free, low salt, or very low salt. Note that foods with reduced salt may not lower your salt intake enough.    Beans, potatoes, and pasta  Ok: Dry beans, split peas, lentils, potatoes, rice, macaroni, pasta, spaghetti without added salt  Avoid: Potato chips, tortilla chips, and similar products  Breads and cereals  Ok: Low-sodium breads, rolls, cereals, and cakes; low-salt crackers, matzo crackers  Avoid: Salted crackers, pretzels, popcorn, Portuguese toast, pancakes, muffins  Dairy  Ok: Milk, chocolate milk, hot chocolate mix, low-salt cheeses, and yogurt  Avoid: Processed cheese and cheese spreads; Roquefort, Camembert, and cottage cheese; buttermilk, instant breakfast drink  Desserts  Ok: Ice cream, frozen yogurt, juice bars, gelatin, cookies and pies, sugar, honey, jelly, hard candy  Avoid: Most pies, cakes and cookies prepared or processed with salt; instant pudding  Drinks  Ok: Tea, coffee, fizzy (carbonated) drinks, juices  Avoid: Flavored  coffees, electrolyte replacement drinks, sports drinks  Meats  Ok: All fresh meat, fish, poultry, low-salt tuna, eggs, egg substitute  Avoid: Smoked, pickled, brine-cured, or salted meats and fish. This includes abbott, chipped beef, corned beef, hot dogs, deli meats, ham, kosher meats, salt pork, sausage, canned tuna, salted codfish, smoked salmon, herring, sardines, or anchovies.  Seasonings and spices  Ok: Most seasonings are okay. Good substitutes for salt include: fresh herb blends, hot sauce, lemon, garlic, camp, vinegar, dry mustard, parsley, cilantro, horseradish, tomato paste, regular margarine, mayonnaise, unsalted butter, cream cheese, vegetable oil, cream, low-salt salad dressing and gravy.  Avoid: Regular ketchup, relishes, pickles, soy sauce, teriyaki sauce, Worcestershire sauce, BBQ sauce, tartar sauce, meat tenderizer, chili sauce, regular gravy, regular salad dressing, salted butter  Soups  Ok: Low-salt soups and broths made with allowed foods  Avoid: Bouillon cubes, soups with smoked or salted meats, regular soup and broth  Vegetables  Ok: Most vegetables are okay; also low-salt tomato and vegetable juices  Avoid: Sauerkraut and other brine-soaked vegetables; pickles and other pickled vegetables; tomato juice, olives  Date Last Reviewed: 8/1/2016 © 2000-2017 [employer]. [address] [place] [address]. All rights reserved. This information is not intended as a substitute for professional medical care. Always follow your healthcare professional's instructions.          Established High Blood Pressure    High blood pressure (hypertension) is a chronic disease. Often, healthcare providers dont know what causes it. But it can be caused by certain health conditions and medicines.  If you have high blood pressure, you may not have any symptoms. If you do have symptoms, they may include headache, dizziness, changes in your vision, chest pain, and shortness of breath. But even  without symptoms, high blood pressure thats not treated raises your risk for heart attack and stroke. High blood pressure is a serious health risk and shouldnt be ignored.  A blood pressure reading is made up of two numbers: a higher number over a lower number. The top number is the systolic pressure. The bottom number is the diastolic pressure. A normal blood pressure is a systolic pressure of  less than 120 over a diastolic pressure of less than 80. You will see your blood pressure readings written together. For example, a person with a systolic pressure of 188 and a diastolic pressure of 78 will have 118/78 written in the medical record.  High blood pressure is when either the top number is 140 or higher, or the bottom number is 90 or higher. This must be the result when taking your blood pressure a number of times. The blood pressures between normal and high are called prehypertension.  Home care  If you have high blood pressure, you should do what is listed below to lower your blood pressure. If you are taking medicines for high blood pressure, these methods may reduce or end your need for medicines in the future.  · Begin a weight-loss program if you are overweight.  · Cut back on how much salt you get in your diet. Heres how to do this:  ¨ Dont eat foods that have a lot of salt. These include olives, pickles, smoked meats, and salted potato chips.  ¨ Dont add salt to your food at the table.  ¨ Use only small amounts of salt when cooking.  · Start an exercise program. Talk with your healthcare provider about the type of exercise program that would be best for you. It doesn't have to be hard. Even brisk walking for 20 minutes 3 times a week is a good form of exercise.  · Dont take medicines that stimulate the heart. This includes many over-the-counter cold and sinus decongestant pills and sprays, as well as diet pills. Check the warnings about hypertension on the label. Before buying any over-the-counter  medicines or supplements, always ask the pharmacist about the product's potential interaction with your high blood pressure and your high blood pressure medicines.  · Stimulants such as amphetamine or cocaine could be deadly for someone with high blood pressure. Never take these.  · Limit how much caffeine you get in your diet. Switch to caffeine-free products.  · Stop smoking. If you are a long-time smoker, this can be hard. Talk to your healthcare provider about medicines and nicotine replacement options to help you. Also, enroll in a stop-smoking program to make it more likely that you will quit for good.  · Learn how to handle stress. This is an important part of any program to lower blood pressure. Learn about relaxation methods like meditation, yoga, or biofeedback.  · If your provider prescribed medicines, take them exactly as directed. Missing doses may cause your blood pressure get out of control.  · If you miss a dose or doses, check with your healthcare provider or pharmacist about what to do.  · Consider buying an automatic blood pressure machine. Ask your provider for a recommendation. You can get one of these at most pharmacies.     The American Heart Association recommends the following guidelines for home blood pressure monitoring:  · Don't smoke or drink coffee for 30 minutes before taking your blood pressure.  · Go to the bathroom before the test.  · Relax for 5 minutes before taking the measurement.  · Sit with your back supported (don't sit on a couch or soft chair); keep your feet on the floor uncrossed. Place your arm on a solid flat surface (like a table) with the upper part of the arm at heart level. Place the middle of the cuff directly above the eye of the elbow. Check the monitor's instruction manual for an illustration.  · Take multiple readings. When you measure, take 2 to 3 readings one minute apart and record all of the results.  · Take your blood pressure at the same time every day,  or as your healthcare provider recommends.  · Record the date, time, and blood pressure reading.  · Take the record with you to your next medical appointment. If your blood pressure monitor has a built-in memory, simply take the monitor with you to your next appointment.  · Call your provider if you have several high readings. Don't be frightened by a single high blood pressure reading, but if you get several high readings, check in with your healthcare provider.  · Note: When blood pressure reaches a systolic (top number) of 180 or higher OR diastolic (bottom number) of 110 or higher, seek emergency medical treatment.  Follow-up care  You will need to see your healthcare provider regularly. This is to check your blood pressure and to make changes to your medicines. Make a follow-up appointment as directed. Bring the record of your home blood pressure readings to the appointment.  When to seek medical advice  Call your healthcare provider right away if any of these occur:  · Blood pressure reaches a systolic (upper number) of 180 or higher OR a diastolic (bottom number) of 110 or higher  · Chest pain or shortness of breath  · Severe headache  · Throbbing or rushing sound in the ears  · Nosebleed  · Sudden severe pain in your belly (abdomen)  · Extreme drowsiness, confusion, or fainting  · Dizziness or spinning sensation (vertigo)  · Weakness of an arm or leg or one side of the face  · You have problems speaking or seeing   Date Last Reviewed: 12/1/2016  © 3979-2896 [email or web address]. [address] [place] [address], [place] [address]. All rights reserved. This information is not intended as a substitute for professional medical care. Always follow your healthcare professional's instructions.

## 2019-03-05 NOTE — PROGRESS NOTES
Subjective:   Patient ID:  Marco A Perez is a 54 y.o. male who presents for cardiac consult of Hypertension (est care) and Hyperlipidemia      HPI  The patient came in today for cardiac consult of Hypertension (est care) and Hyperlipidemia    3/5/19  Marco A Perez is a 54 y.o. male with current medical conditions HTN, HLD,  Migraines presents for initial CV evaluation to establish care and discuss HTN.     BP is much more elevated today than prior visits. Thinks it may be due to white coat HTN. Normally at home BP is 140s/60s-70s. Pt said he has not been to sleep study yet but he does snore and his wife has been trying to have him evaluated for JUAN. He had some salty food this AM - sausage.     Patient feels no chest pain, no sob, no leg swelling, no PND, no palpitation, no dizziness, no syncope, no CNS symptoms.    Patient has fairly good exercise tolerance.  Works as  for [employer].     Patient is compliant with medications.    ECG - NSR, nonspec T wave change, LVH    FH - Mother - DM, ESRD / Father - HTN, MI,     Past Medical History:   Diagnosis Date    HTN (hypertension)     Hyperlipidemia     Migraine        Past Surgical History:   Procedure Laterality Date    COLONOSCOPY N/A 11/10/2017    Performed by Reed Penny MD at [facility]       Social History     Tobacco Use    Smoking status: Never Smoker    Smokeless tobacco: Never Used   Substance Use Topics    Alcohol use: No    Drug use: No       Family History   Problem Relation Age of Onset    Heart disease Mother     Heart disease Father     Diabetes Sister        Patient's Medications   New Prescriptions    HYDROCHLOROTHIAZIDE (HYDRODIURIL) 25 MG TABLET    Take 1 tablet (25 mg total) by mouth once daily.   Previous Medications    ASPIRIN (ECOTRIN) 81 MG EC TABLET    Take 81 mg by mouth once daily.    BUTALBITAL-ACETAMINOPHEN-CAFFEINE -40 MG (FIORICET, ESGIC) -40 MG PER TABLET    TAKE ONE TO TWO TABLETS BY MOUTH EVERY 4 HOURS AS  "NEEDED    OMEPRAZOLE (PRILOSEC) 40 MG CAPSULE    Take 1 capsule (40 mg total) by mouth once daily.    PRAVASTATIN (PRAVACHOL) 40 MG TABLET    Take 1 tablet (40 mg total) by mouth once daily.    TURMERIC ROOT EXTRACT 500 MG CAP    Take 1,000 mg by mouth daily as needed (back pain).   Modified Medications    No medications on file   Discontinued Medications    HYDROCHLOROTHIAZIDE (MICROZIDE) 12.5 MG CAPSULE    TAKE ONE CAPSULE BY MOUTH ONCE DAILY       Review of Systems   Constitutional: Negative.    HENT: Negative.    Eyes: Negative.    Respiratory: Negative.    Cardiovascular: Negative.    Gastrointestinal: Negative.    Genitourinary: Negative.    Musculoskeletal: Negative.    Skin: Negative.    Neurological: Negative.    Endo/Heme/Allergies: Negative.    Psychiatric/Behavioral: Negative.    All 12 systems otherwise negative.      Wt Readings from Last 3 Encounters:   03/05/19 87.6 kg (193 lb 2 oz)   08/20/18 82.9 kg (182 lb 12.2 oz)   01/22/18 86 kg (189 lb 9.5 oz)     Temp Readings from Last 3 Encounters:   08/20/18 97.7 °F (36.5 °C) (Tympanic)   01/22/18 96.9 °F (36.1 °C) (Tympanic)   11/10/17 97.7 °F (36.5 °C)     BP Readings from Last 3 Encounters:   03/05/19 (!) 150/90   08/20/18 128/88   01/22/18 (!) 138/92     Pulse Readings from Last 3 Encounters:   03/05/19 74   08/20/18 83   01/22/18 87       BP (!) 150/90 (BP Method: Large (Manual))   Pulse 74   Ht 5' 11" (1.803 m)   Wt 87.6 kg (193 lb 2 oz)   BMI 26.94 kg/m²     Objective:   Physical Exam   Constitutional: He is oriented to person, place, and time. He appears well-developed and well-nourished. No distress.   HENT:   Head: Normocephalic and atraumatic.   Nose: Nose normal.   Mouth/Throat: Oropharynx is clear and moist.   Eyes: Conjunctivae and EOM are normal. No scleral icterus.   Neck: Normal range of motion. Neck supple. No JVD present. No thyromegaly present.   Cardiovascular: Normal rate, regular rhythm, S1 normal and S2 normal. Exam reveals no " gallop, no S3, no S4 and no friction rub.   No murmur heard.  Pulmonary/Chest: Effort normal and breath sounds normal. No stridor. No respiratory distress. He has no wheezes. He has no rales. He exhibits no tenderness.   Abdominal: Soft. Bowel sounds are normal. He exhibits no distension and no mass. There is no tenderness. There is no rebound.   Genitourinary:   Genitourinary Comments: Deferred   Musculoskeletal: Normal range of motion. He exhibits no edema, tenderness or deformity.   Lymphadenopathy:     He has no cervical adenopathy.   Neurological: He is alert and oriented to person, place, and time. He exhibits normal muscle tone. Coordination normal.   Skin: Skin is warm and dry. No rash noted. He is not diaphoretic. No erythema. No pallor.   Psychiatric: He has a normal mood and affect. His behavior is normal. Judgment and thought content normal.   Nursing note and vitals reviewed.      Lab Results   Component Value Date     09/20/2018    K 4.1 09/20/2018     09/20/2018    CO2 31 (H) 09/20/2018    BUN 14 09/20/2018    CREATININE 1.4 09/20/2018    GLU 88 09/20/2018    AST 12 09/10/2009    ALT 14 09/20/2018    WBC 6.36 02/09/2015    HGB 13.6 (L) 02/09/2015    HCT 42.2 02/09/2015    MCV 84 02/09/2015     02/09/2015    TSH 1.971 12/03/2012    CHOL 169 09/20/2018    HDL 42 09/20/2018    LDLCALC 105.2 09/20/2018    TRIG 109 09/20/2018     Assessment:      1. Essential hypertension    2. JUAN (obstructive sleep apnea)    3. Other hyperlipidemia        Plan:   1. HTN  - increase HCTZ to 25mg  - low salt diet  - 2D ECHO to eval LVH/valves  - keep log of BP at home and report in 1 week    2. JUAN symptoms   - refer to sleep study    3. HLD  - cont statin  - low chetna diet    Thank you for allowing me to participate in this patient's care. Please do not hesitate to contact me with any questions or concerns. Consult note has been forwarded to the referral physician.

## 2019-03-11 ENCOUNTER — TELEPHONE (OUTPATIENT)
Dept: PULMONOLOGY | Facility: CLINIC | Age: 54
End: 2019-03-11

## 2019-03-11 ENCOUNTER — OFFICE VISIT (OUTPATIENT)
Dept: PULMONOLOGY | Facility: CLINIC | Age: 54
End: 2019-03-11
Payer: COMMERCIAL

## 2019-03-11 VITALS
HEART RATE: 69 BPM | HEIGHT: 71 IN | WEIGHT: 190.38 LBS | SYSTOLIC BLOOD PRESSURE: 136 MMHG | OXYGEN SATURATION: 99 % | RESPIRATION RATE: 18 BRPM | BODY MASS INDEX: 26.65 KG/M2 | DIASTOLIC BLOOD PRESSURE: 90 MMHG

## 2019-03-11 DIAGNOSIS — E78.5 HYPERLIPIDEMIA, UNSPECIFIED HYPERLIPIDEMIA TYPE: Chronic | ICD-10-CM

## 2019-03-11 DIAGNOSIS — I10 ESSENTIAL HYPERTENSION: ICD-10-CM

## 2019-03-11 DIAGNOSIS — G47.33 OSA (OBSTRUCTIVE SLEEP APNEA): ICD-10-CM

## 2019-03-11 DIAGNOSIS — G43.809 OTHER MIGRAINE WITHOUT STATUS MIGRAINOSUS, NOT INTRACTABLE: ICD-10-CM

## 2019-03-11 DIAGNOSIS — R06.83 SNORING: ICD-10-CM

## 2019-03-11 DIAGNOSIS — G47.33 OBSTRUCTIVE SLEEP APNEA: Primary | ICD-10-CM

## 2019-03-11 PROCEDURE — 3008F BODY MASS INDEX DOCD: CPT | Mod: CPTII,S$GLB,, | Performed by: NURSE PRACTITIONER

## 2019-03-11 PROCEDURE — 99999 PR PBB SHADOW E&M-EST. PATIENT-LVL III: ICD-10-PCS | Mod: PBBFAC,,, | Performed by: NURSE PRACTITIONER

## 2019-03-11 PROCEDURE — 3080F DIAST BP >= 90 MM HG: CPT | Mod: CPTII,S$GLB,, | Performed by: NURSE PRACTITIONER

## 2019-03-11 PROCEDURE — 3075F PR MOST RECENT SYSTOLIC BLOOD PRESS GE 130-139MM HG: ICD-10-PCS | Mod: CPTII,S$GLB,, | Performed by: NURSE PRACTITIONER

## 2019-03-11 PROCEDURE — 99204 OFFICE O/P NEW MOD 45 MIN: CPT | Mod: S$GLB,,, | Performed by: NURSE PRACTITIONER

## 2019-03-11 PROCEDURE — 3008F PR BODY MASS INDEX (BMI) DOCUMENTED: ICD-10-PCS | Mod: CPTII,S$GLB,, | Performed by: NURSE PRACTITIONER

## 2019-03-11 PROCEDURE — 3080F PR MOST RECENT DIASTOLIC BLOOD PRESSURE >= 90 MM HG: ICD-10-PCS | Mod: CPTII,S$GLB,, | Performed by: NURSE PRACTITIONER

## 2019-03-11 PROCEDURE — 99999 PR PBB SHADOW E&M-EST. PATIENT-LVL III: CPT | Mod: PBBFAC,,, | Performed by: NURSE PRACTITIONER

## 2019-03-11 PROCEDURE — 99204 PR OFFICE/OUTPT VISIT, NEW, LEVL IV, 45-59 MIN: ICD-10-PCS | Mod: S$GLB,,, | Performed by: NURSE PRACTITIONER

## 2019-03-11 PROCEDURE — 3075F SYST BP GE 130 - 139MM HG: CPT | Mod: CPTII,S$GLB,, | Performed by: NURSE PRACTITIONER

## 2019-03-11 NOTE — ASSESSMENT & PLAN NOTE
Home Sleep Study 11/03/2017 mild to moderate obstructive sleep apnea AHI 15.8.   No follow up, never began CPAP   Proceed with repeat Home Sleep Study to established continued obstructive sleep apnea.

## 2019-03-11 NOTE — LETTER
March 11, 2019      Pb Norris MD  [address]  [place] [address]           [facility]  [address] [place] [id number]  Phone: [phone]  Fax: [phone]          Patient: Marco A Perez   MR Number: [id number]   YOB: 1965   Date of Visit: 3/11/2019       Dear Dr. Pb Norris:    Thank you for referring Marco A Perez to me for evaluation. Attached you will find relevant portions of my assessment and plan of care.    If you have questions, please do not hesitate to call me. I look forward to following Marco A Perez along with you.    Sincerely,    Helena Ardon, NP    Enclosure  CC:  No Recipients    If you would like to receive this communication electronically, please contact [email or web address] or [phone] to request more information on [email or web address] access.    For providers and/or their staff who would like to refer a patient to [facility], please contact us through our one-stop-shop provider referral line, [facility], at [phone].    If you feel you have received this communication in error or would no longer like to receive these types of communications, please e-mail [email or web address]

## 2019-03-11 NOTE — PROGRESS NOTES
"  Subjective:      Patient ID: Marco A Perez is a 54 y.o. male.    Patient Active Problem List   Diagnosis    HTN (hypertension)    Migraine    Hypogonadism male    Screening for colon cancer    JUAN (obstructive sleep apnea)    Hyperlipidemia     he has been referred by Pb Norris MD for evaluation and management for   Chief Complaint   Patient presents with    Sleep Apnea     Chief Complaint: Sleep Apnea    HPI:  He presents for a obstructive sleep apnea. He had obstructive sleep apnea diagnosed with Home Sleep Study November 03, 2017 ordered by Dr. Linton.   Patient reports never follow up for results, never began CPAP.  He is referred for re-evaluation of obstructive sleep apnea by his cardiologist, Dr. Norris who patient saw cardiology on his wife prompting since taking bp medication, she wanted patient to have cardiac evaluation.    Patient has continued to be observed by his wife with snoring. She never reports apnea. feels sleepy during the day, take naps during the day.  Patient reports restful sleep and awakens refreshed most morning, rare awakens feeling still tired.  He denies morning headache.   He reports day time napping at lunch; duration 10  Minutes, sets a timer after eats lunch for "cat nap"   He denies recent weight gain.  Cardiovascular risk factors: hypertension and hyperlipidemia  Bed time is 0730  -  0800, usually 0800 - 0815.   Wake time is 0230. At work for 0400am.   Sleep onset is within  15 - 20 Minutes.  Sleep maintenance difficulties related to none.  Wake after sleep onset occurs none.   Nocturia occurs none.   Sleep aids :  NO  Dry mouth :  NO  Sleep walking:  NO  Sleep talking :  NO  Sleep eating: NO  Vivid Dreams :  NO  Cataplexy :  NO    Fairchance sleepiness score was 6.  Neck circumference is 40.5 cm. (16 inches).  Mallampati score 3    STOP - BANG Questionnaire:   1. Snoring : Do you snore loudly ?     YES  2. Tired : Do you often feel tired, fatigued, or sleepy during " daytime?  NO  3. Observed: Has anyone observed you stop breathing during your sleep?    NO  4. Blood pressure : Do you have or are you being treated for high blood pressure?    YES  5. BMI :BMI more than 35 kg/m2?   NO  6. Age : Age over 50 yr old?    YES  7. Neck circumference: Neck circumference greater than 40 cm?    YES  8. Gender: Gender male?    YES    SCORE: 5    High risk of JUAN: Yes 5 - 8  Intermediate risk of JUAN: Yes 3 - 4  Low risk of JUAN: Yes 0 - 2    Previous Report Reviewed: lab reports, office notes and cardiac reports.     Past Medical History: The following portions of the patient's history were reviewed and updated as appropriate:   He  has a past surgical history that includes Colonoscopy (N/A, 11/10/2017).  His family history includes Diabetes in his sister; Heart disease in his father and mother.  He  reports that  has never smoked. he has never used smokeless tobacco. He reports that he does not drink alcohol or use drugs.  He has a current medication list which includes the following prescription(s): aspirin, butalbital-acetaminophen-caffeine -40 mg, hydrochlorothiazide, omeprazole, pravastatin, and turmeric root extract.  He has No Known Allergies.    Review of Systems   Constitutional: Negative for fever, chills, weight loss, weight gain, activity change, appetite change, fatigue and night sweats.   HENT: Negative for postnasal drip, rhinorrhea, sinus pressure, voice change and congestion.    Eyes: Negative for redness and itching.   Respiratory: Negative for snoring, cough, sputum production, chest tightness, shortness of breath, wheezing, orthopnea, asthma nighttime symptoms, dyspnea on extertion, use of rescue inhaler and somnolence.    Cardiovascular: Negative.  Negative for chest pain, palpitations and leg swelling.   Genitourinary: Negative for difficulty urinating and hematuria.   Endocrine: Negative for cold intolerance and heat intolerance.    Musculoskeletal: Negative for  "arthralgias, gait problem, joint swelling and myalgias.   Skin: Negative.    Gastrointestinal: Negative for nausea, vomiting, abdominal pain and acid reflux.   Neurological: Negative for dizziness, weakness, light-headedness and headaches.   Hematological: Negative for adenopathy. No excessive bruising.   All other systems reviewed and are negative.     Objective:   BP (!) 136/90   Pulse 69   Resp 18   Ht 5' 11" (1.803 m)   Wt 86.4 kg (190 lb 5.9 oz)   SpO2 99%   BMI 26.55 kg/m²   Physical Exam   Constitutional: He is oriented to person, place, and time. He appears well-developed and well-nourished.   HENT:   Head: Normocephalic and atraumatic.   Neck circumference is 40.5 cm. (16 inches).  Mallampati score 3     Eyes: Conjunctivae are normal.   Neck: Normal range of motion. Neck supple.   Cardiovascular: Normal rate, regular rhythm, normal heart sounds and intact distal pulses.   Pulmonary/Chest: Effort normal and breath sounds normal.   Abdominal: Soft. Bowel sounds are normal.   Musculoskeletal: Normal range of motion.   Neurological: He is alert and oriented to person, place, and time. He has normal reflexes.   Skin: Skin is warm and dry.   Psychiatric: He has a normal mood and affect. His behavior is normal. Judgment and thought content normal.   Vitals reviewed.    Personal Diagnostic Review  Review of labs, xray's, cardiology reports.     Assessment:     1. Obstructive sleep apnea    2. Snoring    3. Essential hypertension    4. Hyperlipidemia, unspecified hyperlipidemia type    5. Other migraine without status migrainosus, not intractable    6. JUAN (obstructive sleep apnea)      Orders Placed This Encounter   Procedures    Home Sleep Studies     2 night protocol     Standing Status:   Future     Standing Expiration Date:   3/11/2020     Scheduling Instructions:      2 night protocol     Plan:   Discussed diagnosis, its evaluation, treatment and usual course. All questions answered.  Problem List Items " Addressed This Visit     HTN (hypertension)     Not well controlled, recent change in medication has not begun yet. Plan to  today.          Hyperlipidemia (Chronic)     Controlled on Pravachol          Migraine     Stable no flares over past year. Has Fioricet if needed.            JUAN (obstructive sleep apnea)     Home Sleep Study 11/03/2017 mild to moderate obstructive sleep apnea AHI 15.8.   No follow up, never began CPAP   Proceed with repeat Home Sleep Study to established continued obstructive sleep apnea.              Other Visit Diagnoses     Obstructive sleep apnea    -  Primary    Relevant Orders    Home Sleep Studies    Snoring        Relevant Orders    Home Sleep Studies        Follow-up for Home Sleep Study results review.    Thank you for the opportunity to participate in the care of this patient.

## 2019-03-11 NOTE — PATIENT INSTRUCTIONS
What Are Snoring and Obstructive Sleep Apnea?  If youve ever had a stuffed-up nose, you know the feeling of trying to breathe through a very narrow passageway. This is what happens in your throat when you snore. While you sleep, structures in your throat partially block your air passage, making the passage narrow and hard to breathe through. If the entire passage becomes blocked and you cant breathe at all, you have sleep apnea.      Snoring Obstructive sleep apnea   Snoring  If your throat structures are too large or the muscles relax too much during sleep, the air passage may be partially blocked. As air from the nose or mouth passes around this blockage, the throat structures vibrate, causing the familiar sound of snoring. At times, this sound can be so loud that snorers wake up others, or even themselves, during the night. Snoring gets worse as more and more of the air passage is blocked.  Obstructive sleep apnea  If the structures completely block the throat, air cant flow to the lungs at all. This is called apnea (meaning no breathing). Since the lungs arent getting fresh air, the brain tells the body to wake up just enough to tighten the muscles and unblock the air passage. With a loud gasp, breathing begins again. This process may be repeated over and over again throughout the night, making your sleep fragmented with a lighter stage of sleep. Even though you do not remember waking up many times during the night to a lighter sleep, you feel tired the next day. The lack of sleep and fresh air can also strain your lungs, heart, and other organs, leading to problems such as high blood pressure, heart attack, or stroke.  Problems in the nose and jaw  Problems in the structure of the nose may obstruct breathing. A crooked (deviated) septum or swollen turbinates can make snoring worse or lead to apnea. Also, a receding jaw may make the tongue sit too far back, so its more likely to block the airway when  youre asleep.        Date Last Reviewed: 7/18/2015  © 4501-3843 [employer]. [address] [place] [address]. All rights reserved. This information is not intended as a substitute for professional medical care. Always follow your healthcare professional's instructions.        Continuous Positive Air Pressure (CPAP)     A mask over the nose gently directs air into the throat to keep the airway open.   Continuous positive air pressure (CPAP) uses gentle air pressure to hold the airway open. CPAP is often the most effective treatment for sleep apnea and severe snoring. It works very well for many people. But keep in mind that it can take several adjustments before the setup is right for you.  How CPAP works  The CPAP machine  is a small portable pump beside the bed. The pump sends air through a hose, which is held over your nose and mouth by a mask. Mild air pressure is gently pushed through your airway. The air pressure nudges sagging tissues aside. This widens the airway so you can breathe better. CPAP may be combined with other kinds of therapy for sleep apnea.  Types of air pressure treatments  There are different types of CPAP. Your doctor or CPAP technician will help you decide which type is best for you:  · Basic CPAP keeps the pressure constant all night long.  · A bilevel device (BiPAP) provides more pressure when you breathe in and less when you breathe out. A BiPAP machine also may be set to provide automatic breaths to maintain breathing if you stop breathing while sleeping.  · An autoCPAP device automatically adjusts pressure throughout the night and in response to changes such as body position, sleep stage, and snoring.  Date Last Reviewed: 8/10/2015  © 1230-6000 [employer]. [address] [place], [place] [address]. All rights reserved. This information is not intended as a substitute for professional medical care. Always follow your healthcare professional's  instructions.

## 2019-03-13 ENCOUNTER — CLINICAL SUPPORT (OUTPATIENT)
Dept: CARDIOLOGY | Facility: CLINIC | Age: 54
End: 2019-03-13
Attending: INTERNAL MEDICINE
Payer: COMMERCIAL

## 2019-03-13 DIAGNOSIS — I10 ESSENTIAL HYPERTENSION: ICD-10-CM

## 2019-03-13 DIAGNOSIS — G47.33 OSA (OBSTRUCTIVE SLEEP APNEA): ICD-10-CM

## 2019-03-13 PROCEDURE — 93306 2D ECHO WITH COLOR FLOW DOPPLER: ICD-10-PCS | Mod: S$GLB,,, | Performed by: INTERNAL MEDICINE

## 2019-03-13 PROCEDURE — 93306 TTE W/DOPPLER COMPLETE: CPT | Mod: S$GLB,,, | Performed by: INTERNAL MEDICINE

## 2019-03-14 LAB
DIASTOLIC DYSFUNCTION: NO
ESTIMATED PA SYSTOLIC PRESSURE: 23.23
RETIRED EF AND QEF - SEE NOTES: 60 (ref 55–65)
TRICUSPID VALVE REGURGITATION: NORMAL

## 2019-03-15 ENCOUNTER — TELEPHONE (OUTPATIENT)
Dept: CARDIOLOGY | Facility: CLINIC | Age: 54
End: 2019-03-15

## 2019-03-15 NOTE — TELEPHONE ENCOUNTER
----- Message from Pb Norris MD sent at 3/14/2019  3:20 PM CDT -----  Ok called him didn't , overall normal heart function with mild thickness of walls of heart just needs good BP control  ----- Message -----  From: Monica Sung LPN  Sent: 3/14/2019   2:59 PM  To: Pb Norris MD        ----- Message -----  From: Lester Torres LPN  Sent: 3/14/2019   2:45 PM  To: Myrna Ambriz Staff        ----- Message -----  From: Shelia Corey  Sent: 3/14/2019   2:39 PM  To: Swathi Galindo Staff    Pt is calling in regards to his test resutls/please call [phone]/ma

## 2019-09-05 ENCOUNTER — PATIENT OUTREACH (OUTPATIENT)
Dept: ADMINISTRATIVE | Facility: HOSPITAL | Age: 54
End: 2019-09-05

## 2019-09-05 NOTE — PROGRESS NOTES
Attempted to contact patient regarding scheduling labs (A1c) or information on a possible outside provider.   No answer. Left a detailed message including call back information.

## 2019-09-19 DIAGNOSIS — I10 ESSENTIAL HYPERTENSION: Primary | ICD-10-CM

## 2019-09-19 RX ORDER — HYDROCHLOROTHIAZIDE 25 MG/1
25 TABLET ORAL DAILY
Qty: 90 TABLET | Refills: 1 | Status: SHIPPED | OUTPATIENT
Start: 2019-09-19 | End: 2020-03-17 | Stop reason: SDUPTHER

## 2019-10-24 ENCOUNTER — PATIENT OUTREACH (OUTPATIENT)
Dept: ADMINISTRATIVE | Facility: HOSPITAL | Age: 54
End: 2019-10-24

## 2019-10-29 NOTE — TELEPHONE ENCOUNTER
----- Message from Hien Hernandez sent at 10/29/2019  3:33 PM CDT -----  Contact: Pt  Pt is requesting call back in regards to medication refill for medication that's not listed          Pls call back at [phone]

## 2019-10-30 RX ORDER — BUTALBITAL, ACETAMINOPHEN AND CAFFEINE 50; 325; 40 MG/1; MG/1; MG/1
TABLET ORAL
Qty: 30 TABLET | Refills: 3 | OUTPATIENT
Start: 2019-10-30

## 2019-10-31 ENCOUNTER — OFFICE VISIT (OUTPATIENT)
Dept: INTERNAL MEDICINE | Facility: CLINIC | Age: 54
End: 2019-10-31
Payer: COMMERCIAL

## 2019-10-31 VITALS
WEIGHT: 191.56 LBS | HEART RATE: 76 BPM | BODY MASS INDEX: 26.82 KG/M2 | HEIGHT: 71 IN | TEMPERATURE: 97 F | DIASTOLIC BLOOD PRESSURE: 82 MMHG | OXYGEN SATURATION: 98 % | SYSTOLIC BLOOD PRESSURE: 122 MMHG

## 2019-10-31 DIAGNOSIS — I10 ESSENTIAL HYPERTENSION: ICD-10-CM

## 2019-10-31 DIAGNOSIS — Z12.5 SCREENING FOR PROSTATE CANCER: ICD-10-CM

## 2019-10-31 DIAGNOSIS — G43.809 OTHER MIGRAINE WITHOUT STATUS MIGRAINOSUS, NOT INTRACTABLE: Primary | ICD-10-CM

## 2019-10-31 DIAGNOSIS — E78.5 HYPERLIPIDEMIA, UNSPECIFIED HYPERLIPIDEMIA TYPE: Chronic | ICD-10-CM

## 2019-10-31 PROCEDURE — 99214 PR OFFICE/OUTPT VISIT, EST, LEVL IV, 30-39 MIN: ICD-10-PCS | Mod: S$GLB,,, | Performed by: NURSE PRACTITIONER

## 2019-10-31 PROCEDURE — 3008F PR BODY MASS INDEX (BMI) DOCUMENTED: ICD-10-PCS | Mod: CPTII,S$GLB,, | Performed by: NURSE PRACTITIONER

## 2019-10-31 PROCEDURE — 3074F SYST BP LT 130 MM HG: CPT | Mod: CPTII,S$GLB,, | Performed by: NURSE PRACTITIONER

## 2019-10-31 PROCEDURE — 99999 PR PBB SHADOW E&M-EST. PATIENT-LVL III: ICD-10-PCS | Mod: PBBFAC,,, | Performed by: NURSE PRACTITIONER

## 2019-10-31 PROCEDURE — 3008F BODY MASS INDEX DOCD: CPT | Mod: CPTII,S$GLB,, | Performed by: NURSE PRACTITIONER

## 2019-10-31 PROCEDURE — 99999 PR PBB SHADOW E&M-EST. PATIENT-LVL III: CPT | Mod: PBBFAC,,, | Performed by: NURSE PRACTITIONER

## 2019-10-31 PROCEDURE — 99214 OFFICE O/P EST MOD 30 MIN: CPT | Mod: S$GLB,,, | Performed by: NURSE PRACTITIONER

## 2019-10-31 PROCEDURE — 3074F PR MOST RECENT SYSTOLIC BLOOD PRESSURE < 130 MM HG: ICD-10-PCS | Mod: CPTII,S$GLB,, | Performed by: NURSE PRACTITIONER

## 2019-10-31 PROCEDURE — 3079F DIAST BP 80-89 MM HG: CPT | Mod: CPTII,S$GLB,, | Performed by: NURSE PRACTITIONER

## 2019-10-31 PROCEDURE — 3079F PR MOST RECENT DIASTOLIC BLOOD PRESSURE 80-89 MM HG: ICD-10-PCS | Mod: CPTII,S$GLB,, | Performed by: NURSE PRACTITIONER

## 2019-10-31 RX ORDER — BUTALBITAL, ACETAMINOPHEN AND CAFFEINE 50; 325; 40 MG/1; MG/1; MG/1
TABLET ORAL
Qty: 30 TABLET | Refills: 1 | Status: SHIPPED | OUTPATIENT
Start: 2019-10-31 | End: 2023-05-06 | Stop reason: SDUPTHER

## 2019-10-31 NOTE — PROGRESS NOTES
Subjective:       Patient ID: Marco A Perez is a 54 y.o. male.    Chief Complaint: Medication Refill and Headache    Patient presents for a medication refills.  Has history of headaches.  Controlled with Fioricet.  Due for annual exam.     Review of Systems   Constitutional: Negative for chills and fatigue.   Eyes: Negative for visual disturbance.   Respiratory: Negative for cough and shortness of breath.    Cardiovascular: Negative for chest pain.   Musculoskeletal: Negative for arthralgias and gait problem.   Skin: Negative for color change and rash.   Neurological: Negative for dizziness and headaches.   Psychiatric/Behavioral: Negative for agitation and confusion.       Objective:      Physical Exam   Constitutional: He is oriented to person, place, and time. Vital signs are normal. He appears well-developed and well-nourished.   HENT:   Head: Normocephalic and atraumatic.   Neck: Normal range of motion.   Cardiovascular: Normal rate and regular rhythm.   Pulmonary/Chest: Effort normal and breath sounds normal.   Musculoskeletal: Normal range of motion.   Neurological: He is alert and oriented to person, place, and time.   Skin: Skin is warm.   Psychiatric: He has a normal mood and affect. His behavior is normal.       Assessment:       1. Other migraine without status migrainosus, not intractable    2. Essential hypertension    3. Hyperlipidemia, unspecified hyperlipidemia type    4. Screening for prostate cancer        Plan:         Other migraine without status migrainosus, not intractable    Essential hypertension  -     Comprehensive metabolic panel; Future; Expected date: 10/31/2019  -     CBC auto differential; Future; Expected date: 10/31/2019    Hyperlipidemia, unspecified hyperlipidemia type  -     Lipid panel; Future; Expected date: 10/31/2019  -     Comprehensive metabolic panel; Future; Expected date: 10/31/2019  -     CBC auto differential; Future; Expected date: 10/31/2019    Screening for prostate  cancer  -     PSA, Screening; Future; Expected date: 10/31/2019    Other orders  -     butalbital-acetaminophen-caffeine -40 mg (FIORICET, ESGIC) -40 mg per tablet; TAKE ONE TO TWO TABLETS BY MOUTH EVERY 4 HOURS AS NEEDED  Dispense: 30 tablet; Refill: 1        Medication refilled.  Due for annual exam.  Will schedule labs and visit.

## 2019-11-07 ENCOUNTER — LAB VISIT (OUTPATIENT)
Dept: LAB | Facility: HOSPITAL | Age: 54
End: 2019-11-07
Attending: NURSE PRACTITIONER
Payer: COMMERCIAL

## 2019-11-07 DIAGNOSIS — E78.5 HYPERLIPIDEMIA, UNSPECIFIED HYPERLIPIDEMIA TYPE: Chronic | ICD-10-CM

## 2019-11-07 DIAGNOSIS — I10 ESSENTIAL HYPERTENSION: ICD-10-CM

## 2019-11-07 DIAGNOSIS — Z12.5 SCREENING FOR PROSTATE CANCER: ICD-10-CM

## 2019-11-07 LAB
ALBUMIN SERPL BCP-MCNC: 4.2 G/DL (ref 3.5–5.2)
ALP SERPL-CCNC: 37 U/L (ref 55–135)
ALT SERPL W/O P-5'-P-CCNC: 13 U/L (ref 10–44)
ANION GAP SERPL CALC-SCNC: 9 MMOL/L (ref 8–16)
AST SERPL-CCNC: 17 U/L (ref 10–40)
BASOPHILS # BLD AUTO: 0.04 K/UL (ref 0–0.2)
BASOPHILS NFR BLD: 0.7 % (ref 0–1.9)
BILIRUB SERPL-MCNC: 0.4 MG/DL (ref 0.1–1)
BUN SERPL-MCNC: 20 MG/DL (ref 6–20)
CALCIUM SERPL-MCNC: 9.6 MG/DL (ref 8.7–10.5)
CHLORIDE SERPL-SCNC: 103 MMOL/L (ref 95–110)
CHOLEST SERPL-MCNC: 184 MG/DL (ref 120–199)
CHOLEST/HDLC SERPL: 3.8 {RATIO} (ref 2–5)
CO2 SERPL-SCNC: 30 MMOL/L (ref 23–29)
COMPLEXED PSA SERPL-MCNC: 0.61 NG/ML (ref 0–4)
CREAT SERPL-MCNC: 1.7 MG/DL (ref 0.5–1.4)
DIFFERENTIAL METHOD: ABNORMAL
EOSINOPHIL # BLD AUTO: 0.1 K/UL (ref 0–0.5)
EOSINOPHIL NFR BLD: 1.8 % (ref 0–8)
ERYTHROCYTE [DISTWIDTH] IN BLOOD BY AUTOMATED COUNT: 14.2 % (ref 11.5–14.5)
EST. GFR  (AFRICAN AMERICAN): 52 ML/MIN/1.73 M^2
EST. GFR  (NON AFRICAN AMERICAN): 45 ML/MIN/1.73 M^2
GLUCOSE SERPL-MCNC: 85 MG/DL (ref 70–110)
HCT VFR BLD AUTO: 36.7 % (ref 40–54)
HDLC SERPL-MCNC: 49 MG/DL (ref 40–75)
HDLC SERPL: 26.6 % (ref 20–50)
HGB BLD-MCNC: 11.5 G/DL (ref 14–18)
IMM GRANULOCYTES # BLD AUTO: 0.01 K/UL (ref 0–0.04)
IMM GRANULOCYTES NFR BLD AUTO: 0.2 % (ref 0–0.5)
LDLC SERPL CALC-MCNC: 117.8 MG/DL (ref 63–159)
LYMPHOCYTES # BLD AUTO: 2 K/UL (ref 1–4.8)
LYMPHOCYTES NFR BLD: 34.7 % (ref 18–48)
MCH RBC QN AUTO: 26.1 PG (ref 27–31)
MCHC RBC AUTO-ENTMCNC: 31.3 G/DL (ref 32–36)
MCV RBC AUTO: 83 FL (ref 82–98)
MONOCYTES # BLD AUTO: 0.5 K/UL (ref 0.3–1)
MONOCYTES NFR BLD: 9.1 % (ref 4–15)
NEUTROPHILS # BLD AUTO: 3.1 K/UL (ref 1.8–7.7)
NEUTROPHILS NFR BLD: 53.7 % (ref 38–73)
NONHDLC SERPL-MCNC: 135 MG/DL
NRBC BLD-RTO: 0 /100 WBC
PLATELET # BLD AUTO: 322 K/UL (ref 150–350)
PMV BLD AUTO: 10.1 FL (ref 9.2–12.9)
POTASSIUM SERPL-SCNC: 3.7 MMOL/L (ref 3.5–5.1)
PROT SERPL-MCNC: 7.6 G/DL (ref 6–8.4)
RBC # BLD AUTO: 4.41 M/UL (ref 4.6–6.2)
SODIUM SERPL-SCNC: 142 MMOL/L (ref 136–145)
TRIGL SERPL-MCNC: 86 MG/DL (ref 30–150)
WBC # BLD AUTO: 5.7 K/UL (ref 3.9–12.7)

## 2019-11-07 PROCEDURE — 84153 ASSAY OF PSA TOTAL: CPT

## 2019-11-07 PROCEDURE — 80053 COMPREHEN METABOLIC PANEL: CPT

## 2019-11-07 PROCEDURE — 36415 COLL VENOUS BLD VENIPUNCTURE: CPT

## 2019-11-07 PROCEDURE — 85025 COMPLETE CBC W/AUTO DIFF WBC: CPT

## 2019-11-07 PROCEDURE — 80061 LIPID PANEL: CPT

## 2019-11-18 ENCOUNTER — OFFICE VISIT (OUTPATIENT)
Dept: INTERNAL MEDICINE | Facility: CLINIC | Age: 54
End: 2019-11-18
Payer: COMMERCIAL

## 2019-11-18 ENCOUNTER — LAB VISIT (OUTPATIENT)
Dept: LAB | Facility: HOSPITAL | Age: 54
End: 2019-11-18
Attending: FAMILY MEDICINE
Payer: COMMERCIAL

## 2019-11-18 VITALS
HEART RATE: 80 BPM | TEMPERATURE: 98 F | OXYGEN SATURATION: 98 % | BODY MASS INDEX: 26.67 KG/M2 | SYSTOLIC BLOOD PRESSURE: 130 MMHG | DIASTOLIC BLOOD PRESSURE: 92 MMHG | HEIGHT: 71 IN | WEIGHT: 190.5 LBS

## 2019-11-18 DIAGNOSIS — G43.809 OTHER MIGRAINE WITHOUT STATUS MIGRAINOSUS, NOT INTRACTABLE: ICD-10-CM

## 2019-11-18 DIAGNOSIS — D64.9 ANEMIA, UNSPECIFIED TYPE: ICD-10-CM

## 2019-11-18 DIAGNOSIS — Z12.5 SCREENING FOR PROSTATE CANCER: ICD-10-CM

## 2019-11-18 DIAGNOSIS — Z00.00 ROUTINE HEALTH MAINTENANCE: Primary | ICD-10-CM

## 2019-11-18 DIAGNOSIS — I10 ESSENTIAL HYPERTENSION: ICD-10-CM

## 2019-11-18 DIAGNOSIS — R79.89 ELEVATED SERUM CREATININE: ICD-10-CM

## 2019-11-18 DIAGNOSIS — E29.1 HYPOGONADISM IN MALE: ICD-10-CM

## 2019-11-18 LAB
ANION GAP SERPL CALC-SCNC: 10 MMOL/L (ref 8–16)
BASOPHILS # BLD AUTO: 0.06 K/UL (ref 0–0.2)
BASOPHILS NFR BLD: 0.9 % (ref 0–1.9)
BUN SERPL-MCNC: 14 MG/DL (ref 6–20)
CALCIUM SERPL-MCNC: 9.3 MG/DL (ref 8.7–10.5)
CHLORIDE SERPL-SCNC: 102 MMOL/L (ref 95–110)
CO2 SERPL-SCNC: 29 MMOL/L (ref 23–29)
CREAT SERPL-MCNC: 1.5 MG/DL (ref 0.5–1.4)
DIFFERENTIAL METHOD: ABNORMAL
EOSINOPHIL # BLD AUTO: 0.1 K/UL (ref 0–0.5)
EOSINOPHIL NFR BLD: 1.7 % (ref 0–8)
ERYTHROCYTE [DISTWIDTH] IN BLOOD BY AUTOMATED COUNT: 14.4 % (ref 11.5–14.5)
EST. GFR  (AFRICAN AMERICAN): >60 ML/MIN/1.73 M^2
EST. GFR  (NON AFRICAN AMERICAN): 52.4 ML/MIN/1.73 M^2
FERRITIN SERPL-MCNC: 381 NG/ML (ref 20–300)
GLUCOSE SERPL-MCNC: 82 MG/DL (ref 70–110)
HCT VFR BLD AUTO: 37.5 % (ref 40–54)
HGB BLD-MCNC: 11.7 G/DL (ref 14–18)
IMM GRANULOCYTES # BLD AUTO: 0.01 K/UL (ref 0–0.04)
IMM GRANULOCYTES NFR BLD AUTO: 0.1 % (ref 0–0.5)
IRON SERPL-MCNC: 54 UG/DL (ref 45–160)
LYMPHOCYTES # BLD AUTO: 2.8 K/UL (ref 1–4.8)
LYMPHOCYTES NFR BLD: 39.3 % (ref 18–48)
MCH RBC QN AUTO: 26.6 PG (ref 27–31)
MCHC RBC AUTO-ENTMCNC: 31.2 G/DL (ref 32–36)
MCV RBC AUTO: 85 FL (ref 82–98)
MONOCYTES # BLD AUTO: 0.5 K/UL (ref 0.3–1)
MONOCYTES NFR BLD: 7.4 % (ref 4–15)
NEUTROPHILS # BLD AUTO: 3.5 K/UL (ref 1.8–7.7)
NEUTROPHILS NFR BLD: 50.6 % (ref 38–73)
NRBC BLD-RTO: 0 /100 WBC
PLATELET # BLD AUTO: 345 K/UL (ref 150–350)
PMV BLD AUTO: 10.5 FL (ref 9.2–12.9)
POTASSIUM SERPL-SCNC: 3.8 MMOL/L (ref 3.5–5.1)
RBC # BLD AUTO: 4.4 M/UL (ref 4.6–6.2)
SATURATED IRON: 17 % (ref 20–50)
SODIUM SERPL-SCNC: 141 MMOL/L (ref 136–145)
TOTAL IRON BINDING CAPACITY: 323 UG/DL (ref 250–450)
TRANSFERRIN SERPL-MCNC: 218 MG/DL (ref 200–375)
WBC # BLD AUTO: 7 K/UL (ref 3.9–12.7)

## 2019-11-18 PROCEDURE — 85025 COMPLETE CBC W/AUTO DIFF WBC: CPT

## 2019-11-18 PROCEDURE — 99999 PR PBB SHADOW E&M-EST. PATIENT-LVL III: CPT | Mod: PBBFAC,,, | Performed by: FAMILY MEDICINE

## 2019-11-18 PROCEDURE — 3080F PR MOST RECENT DIASTOLIC BLOOD PRESSURE >= 90 MM HG: ICD-10-PCS | Mod: CPTII,S$GLB,, | Performed by: FAMILY MEDICINE

## 2019-11-18 PROCEDURE — 80048 BASIC METABOLIC PNL TOTAL CA: CPT

## 2019-11-18 PROCEDURE — 3075F PR MOST RECENT SYSTOLIC BLOOD PRESS GE 130-139MM HG: ICD-10-PCS | Mod: CPTII,S$GLB,, | Performed by: FAMILY MEDICINE

## 2019-11-18 PROCEDURE — 3080F DIAST BP >= 90 MM HG: CPT | Mod: CPTII,S$GLB,, | Performed by: FAMILY MEDICINE

## 2019-11-18 PROCEDURE — 99396 PREV VISIT EST AGE 40-64: CPT | Mod: S$GLB,,, | Performed by: FAMILY MEDICINE

## 2019-11-18 PROCEDURE — 99396 PR PREVENTIVE VISIT,EST,40-64: ICD-10-PCS | Mod: S$GLB,,, | Performed by: FAMILY MEDICINE

## 2019-11-18 PROCEDURE — 36415 COLL VENOUS BLD VENIPUNCTURE: CPT

## 2019-11-18 PROCEDURE — 83540 ASSAY OF IRON: CPT

## 2019-11-18 PROCEDURE — 3075F SYST BP GE 130 - 139MM HG: CPT | Mod: CPTII,S$GLB,, | Performed by: FAMILY MEDICINE

## 2019-11-18 PROCEDURE — 99999 PR PBB SHADOW E&M-EST. PATIENT-LVL III: ICD-10-PCS | Mod: PBBFAC,,, | Performed by: FAMILY MEDICINE

## 2019-11-18 PROCEDURE — 82728 ASSAY OF FERRITIN: CPT

## 2019-11-18 RX ORDER — PRAVASTATIN SODIUM 40 MG/1
40 TABLET ORAL DAILY
Qty: 90 TABLET | Refills: 3 | Status: SHIPPED | OUTPATIENT
Start: 2019-11-18 | End: 2020-11-11 | Stop reason: SDUPTHER

## 2019-11-18 RX ORDER — SILDENAFIL 100 MG/1
TABLET, FILM COATED ORAL
Qty: 30 TABLET | Refills: 11 | Status: SHIPPED | OUTPATIENT
Start: 2019-11-18 | End: 2021-06-30

## 2019-11-19 ENCOUNTER — TELEPHONE (OUTPATIENT)
Dept: INTERNAL MEDICINE | Facility: CLINIC | Age: 54
End: 2019-11-19

## 2019-11-19 ENCOUNTER — PATIENT MESSAGE (OUTPATIENT)
Dept: INTERNAL MEDICINE | Facility: CLINIC | Age: 54
End: 2019-11-19

## 2019-11-19 DIAGNOSIS — D64.9 ANEMIA, UNSPECIFIED TYPE: ICD-10-CM

## 2019-11-19 DIAGNOSIS — N28.9 RENAL INSUFFICIENCY: Primary | ICD-10-CM

## 2019-11-19 NOTE — TELEPHONE ENCOUNTER
Please let him know blood count isbetter and no sign of obvious bleeding as source of anemia  Also kidney function improved.   We will monitor. Norris lab 3 months and please make sure hydrate well prior to lab  No need to see kidney doctor since blood test improved

## 2019-11-19 NOTE — TELEPHONE ENCOUNTER
----- Message from Deepali Martínez sent at 11/19/2019  4:08 PM CST -----  Contact: pt   He's calling in regards to missed call ,pls call pt back at [phone] (home)

## 2019-11-20 ENCOUNTER — TELEPHONE (OUTPATIENT)
Dept: INTERNAL MEDICINE | Facility: CLINIC | Age: 54
End: 2019-11-20

## 2019-11-20 NOTE — TELEPHONE ENCOUNTER
----- Message from Darline Mcguire sent at 11/20/2019  8:29 AM CST -----  Contact: pt   Type:  Patient Returning Call    Who Called: Marco A Perez   Who Left Message for Patient: LILIANA MORRIS  Does the patient know what this is regarding?:  Would the patient rather a call back or a response via [facility]? Call back   Best Call Back Number:[phone]  Additional Information: pt states to please call his cell because he is at work

## 2019-12-02 NOTE — PROGRESS NOTES
Subjective:       Patient ID: Marco A Perez is a 54 y.o. male.    Chief Complaint: Follow-up    HPIhtn bp elev  D/wd elev creat avoiding nsaidsandposs dehydration  dwd anemia  Hx hypogonadism  infreq migraine    ED Side effects and dosing discussed viagra including avoiding nitrates and notify visual hearing loss      Past Medical History:   Diagnosis Date    HTN (hypertension)     Hyperlipidemia     Migraine      Past Surgical History:   Procedure Laterality Date    COLONOSCOPY N/A 11/10/2017    Procedure: COLONOSCOPY;  Surgeon: Reed Penny MD;  Location: [facility];  Service: Endoscopy;  Laterality: N/A;     Family History   Problem Relation Age of Onset    Heart disease Mother     Heart disease Father     Diabetes Sister      Social History     Socioeconomic History    Marital status:      Spouse name: Not on file    Number of children: Not on file    Years of education: Not on file    Highest education level: Not on file   Occupational History    Not on file   Social Needs    Financial resource strain: Not on file    Food insecurity:     Worry: Not on file     Inability: Not on file    Transportation needs:     Medical: Not on file     Non-medical: Not on file   Tobacco Use    Smoking status: Never Smoker    Smokeless tobacco: Never Used   Substance and Sexual Activity    Alcohol use: No    Drug use: No    Sexual activity: Not on file   Lifestyle    Physical activity:     Days per week: Not on file     Minutes per session: Not on file    Stress: Not on file   Relationships    Social connections:     Talks on phone: Not on file     Gets together: Not on file     Attends Hindu service: Not on file     Active member of club or organization: Not on file     Attends meetings of clubs or organizations: Not on file     Relationship status: Not on file   Other Topics Concern    Not on file   Social History Narrative    Not on file       Review of Systems  Cardiovascular: no chest  pain  Chest: no shortness of breath  Abd: no abd pain  Remainder review of systems negative    Objective:      Physical Exam   Constitutional: He is oriented to person, place, and time. He appears well-developed and well-nourished.   HENT:   Head: Normocephalic and atraumatic.   Right Ear: External ear normal.   Left Ear: External ear normal.   Nose: Nose normal.   Mouth/Throat: Oropharynx is clear and moist.   Nl tms   Eyes: Pupils are equal, round, and reactive to light. Conjunctivae and EOM are normal. No scleral icterus.   Neck: Normal range of motion. Neck supple. Carotid bruit is not present.   Cardiovascular: Normal rate, regular rhythm and normal heart sounds. Exam reveals no gallop and no friction rub.   No murmur heard.  Pulmonary/Chest: Effort normal and breath sounds normal. He has no wheezes.   Abdominal: Soft. Bowel sounds are normal. He exhibits no distension and no mass. There is no hepatosplenomegaly. There is no tenderness. There is no rebound and no guarding.   Musculoskeletal: Normal range of motion. He exhibits no tenderness.   Lymphadenopathy:     He has no cervical adenopathy.   Neurological: He is alert and oriented to person, place, and time. No cranial nerve deficit. Coordination normal.   Skin: Skin is warm and dry. No rash noted. No erythema.   Psychiatric: He has a normal mood and affect. His behavior is normal. Judgment and thought content normal.   Nursing note and vitals reviewed.      Assessment:       1. Routine health maintenance    2. Anemia, unspecified type    3. Elevated serum creatinine    4. Screening for prostate cancer    5. Essential hypertension    6. Other migraine without status migrainosus, not intractable    7. Hypogonadism in male        Plan:       **Routine health maintenance    Anemia, unspecified type  -     CBC auto differential; Future; Expected date: 11/18/2019  -     Iron and TIBC; Future; Expected date: 11/18/2019  -     Ferritin; Future; Expected date:  11/18/2019    Elevated serum creatinine  -     Basic metabolic panel; Future; Expected date: 11/18/2019    Screening for prostate cancer  -     PSA, Screening; Future; Expected date: 11/17/2020    Essential hypertension  -     Comprehensive metabolic panel; Future; Expected date: 11/17/2020  -     CBC auto differential; Future; Expected date: 11/18/2020  -     Lipid panel; Future; Expected date: 11/17/2020    Other migraine without status migrainosus, not intractable    Hypogonadism in male  -     Testosterone; Future; Expected date: 11/18/2020    Other orders  -     pravastatin (PRAVACHOL) 40 MG tablet; Take 1 tablet (40 mg total) by mouth once daily.  Dispense: 90 tablet; Refill: 3  -     sildenafil (VIAGRA) 100 MG tablet; take 1/2 to 1 tablet by mouth one hour prior to intercourse. Max 100 mg per 24 hours  Dispense: 30 tablet; Refill: 11    *  Lab today- bmp cbc ferritin iron  Lab 12 months and follow up after  Nurse bp check 2 weeks  Shingrix new shingles vaccine  via a pharmacy    Home bps  Asa +/- discussed

## 2020-03-17 ENCOUNTER — TELEPHONE (OUTPATIENT)
Dept: CARDIOLOGY | Facility: CLINIC | Age: 55
End: 2020-03-17

## 2020-03-17 ENCOUNTER — TELEPHONE (OUTPATIENT)
Dept: INTERNAL MEDICINE | Facility: CLINIC | Age: 55
End: 2020-03-17

## 2020-03-17 DIAGNOSIS — I10 ESSENTIAL HYPERTENSION: ICD-10-CM

## 2020-03-17 RX ORDER — HYDROCHLOROTHIAZIDE 25 MG/1
25 TABLET ORAL DAILY
Qty: 90 TABLET | Refills: 0 | Status: SHIPPED | OUTPATIENT
Start: 2020-03-17 | End: 2020-07-07 | Stop reason: SDUPTHER

## 2020-03-17 RX ORDER — HYDROCHLOROTHIAZIDE 25 MG/1
25 TABLET ORAL DAILY
Qty: 90 TABLET | Refills: 1 | Status: CANCELLED | OUTPATIENT
Start: 2020-03-17 | End: 2021-03-17

## 2020-03-17 NOTE — TELEPHONE ENCOUNTER
----- Message from Blaine Diane MA sent at 3/17/2020 10:53 AM CDT -----  Contact: Patient       ----- Message -----  From: Bere Mcguire  Sent: 3/17/2020  10:05 AM CDT  To: Royer GONSALVES Staff    Type:  RX Refill Request    Who Called: Marco A  Refill or New Rx: Refill   RX Name and Strength: Hydrochlorothiazide 25 mg  How is the patient currently taking it? (ex. 1XDay): 1x day  Is this a 30 day or 90 day RX: 90  Preferred Pharmacy with phone number:   [facility] [address] [place] [address]  Phone: [phone] Fax: [phone]  Local or Mail Order: Local   Ordering Provider: Dr. Norris  Would the patient rather a call back or a response via [facility]? Call back   Best Call Back Number: Please call him at [phone]  Additional Information: n/a

## 2020-03-17 NOTE — TELEPHONE ENCOUNTER
----- Message from Bere Mcguire sent at 3/17/2020 10:05 AM CDT -----  Contact: Patient   Type:  RX Refill Request    Who Called: Marco A  Refill or New Rx: Refill   RX Name and Strength: Hydrochlorothiazide 25 mg  How is the patient currently taking it? (ex. 1XDay): 1x day  Is this a 30 day or 90 day RX: 90  Preferred Pharmacy with phone number:   [facility] [address] [address] [address]  Phone: [phone] Fax: [phone]  Local or Mail Order: Local   Ordering Provider: Dr. Norris  Would the patient rather a call back or a response via [facility]? Call back   Best Call Back Number: Please call him at [phone]  Additional Information: n/a

## 2020-05-04 ENCOUNTER — OFFICE VISIT (OUTPATIENT)
Dept: INTERNAL MEDICINE | Facility: CLINIC | Age: 55
End: 2020-05-04
Payer: COMMERCIAL

## 2020-05-04 VITALS
OXYGEN SATURATION: 97 % | WEIGHT: 189.13 LBS | HEART RATE: 85 BPM | SYSTOLIC BLOOD PRESSURE: 126 MMHG | HEIGHT: 71 IN | DIASTOLIC BLOOD PRESSURE: 84 MMHG | BODY MASS INDEX: 26.48 KG/M2 | TEMPERATURE: 98 F

## 2020-05-04 DIAGNOSIS — G43.809 OTHER MIGRAINE WITHOUT STATUS MIGRAINOSUS, NOT INTRACTABLE: ICD-10-CM

## 2020-05-04 DIAGNOSIS — Z11.4 ENCOUNTER FOR SCREENING FOR HUMAN IMMUNODEFICIENCY VIRUS (HIV): ICD-10-CM

## 2020-05-04 DIAGNOSIS — D64.9 ANEMIA, UNSPECIFIED TYPE: Primary | ICD-10-CM

## 2020-05-04 DIAGNOSIS — I10 ESSENTIAL HYPERTENSION: ICD-10-CM

## 2020-05-04 PROCEDURE — 99999 PR PBB SHADOW E&M-EST. PATIENT-LVL III: ICD-10-PCS | Mod: PBBFAC,,, | Performed by: FAMILY MEDICINE

## 2020-05-04 PROCEDURE — 3074F PR MOST RECENT SYSTOLIC BLOOD PRESSURE < 130 MM HG: ICD-10-PCS | Mod: CPTII,S$GLB,, | Performed by: FAMILY MEDICINE

## 2020-05-04 PROCEDURE — 3074F SYST BP LT 130 MM HG: CPT | Mod: CPTII,S$GLB,, | Performed by: FAMILY MEDICINE

## 2020-05-04 PROCEDURE — 3008F BODY MASS INDEX DOCD: CPT | Mod: CPTII,S$GLB,, | Performed by: FAMILY MEDICINE

## 2020-05-04 PROCEDURE — 3008F PR BODY MASS INDEX (BMI) DOCUMENTED: ICD-10-PCS | Mod: CPTII,S$GLB,, | Performed by: FAMILY MEDICINE

## 2020-05-04 PROCEDURE — 99214 OFFICE O/P EST MOD 30 MIN: CPT | Mod: S$GLB,,, | Performed by: FAMILY MEDICINE

## 2020-05-04 PROCEDURE — 3079F PR MOST RECENT DIASTOLIC BLOOD PRESSURE 80-89 MM HG: ICD-10-PCS | Mod: CPTII,S$GLB,, | Performed by: FAMILY MEDICINE

## 2020-05-04 PROCEDURE — 99214 PR OFFICE/OUTPT VISIT, EST, LEVL IV, 30-39 MIN: ICD-10-PCS | Mod: S$GLB,,, | Performed by: FAMILY MEDICINE

## 2020-05-04 PROCEDURE — 99999 PR PBB SHADOW E&M-EST. PATIENT-LVL III: CPT | Mod: PBBFAC,,, | Performed by: FAMILY MEDICINE

## 2020-05-04 PROCEDURE — 3079F DIAST BP 80-89 MM HG: CPT | Mod: CPTII,S$GLB,, | Performed by: FAMILY MEDICINE

## 2020-05-04 RX ORDER — AMLODIPINE BESYLATE 5 MG/1
5 TABLET ORAL DAILY
Qty: 30 TABLET | Refills: 11 | Status: SHIPPED | OUTPATIENT
Start: 2020-05-04 | End: 2021-05-17

## 2020-05-04 RX ORDER — AMLODIPINE BESYLATE 5 MG/1
TABLET ORAL
COMMUNITY
Start: 2020-05-03 | End: 2020-05-04

## 2020-05-04 NOTE — PROGRESS NOTES
Subjective:       Patient ID: Marco A Perez is a 55 y.o. male.    Chief Complaint: Follow-up (Elevated blood pressure)    [facility] w migraine and bp noted 160s. bp med given there and rxd amlod first one today. Home bps prior to headache 140-150s syst. First migraine since last fall  Review of Systems  no c.o chst pain sob  Objective:      Physical Exam  gen nad  cvrrr  Assessment:       1. Anemia, unspecified type    2. Essential hypertension    3. Other migraine without status migrainosus, not intractable    4. Encounter for screening for human immunodeficiency virus (HIV)        Plan:       *cont amlod. Side effects and dosing discussed  Cont hctz  Monitor /record home blood pressures different times today after 2 minute rest and notify if 140/90 or higher often.  **  add ;ab to nov lab andf /u  Anemia, unspecified type  -     Hemoglobin Electrophoresis,Hgb A2 Clifford.; Future; Expected date: 11/04/2020    Essential hypertension    Other migraine without status migrainosus, not intractable    Encounter for screening for human immunodeficiency virus (HIV)  -     HIV 1/2 Ag/Ab (4th Gen); Future; Expected date: 11/04/2020    Other orders  -     amLODIPine (NORVASC) 5 MG tablet; Take 1 tablet (5 mg total) by mouth once daily.  Dispense: 30 tablet; Refill: 11

## 2020-05-28 ENCOUNTER — HOSPITAL ENCOUNTER (OUTPATIENT)
Dept: RADIOLOGY | Facility: HOSPITAL | Age: 55
Discharge: HOME | End: 2020-05-28
Attending: NURSE PRACTITIONER
Payer: COMMERCIAL

## 2020-05-28 ENCOUNTER — OFFICE VISIT (OUTPATIENT)
Dept: INTERNAL MEDICINE | Facility: CLINIC | Age: 55
End: 2020-05-28
Payer: COMMERCIAL

## 2020-05-28 ENCOUNTER — TELEPHONE (OUTPATIENT)
Dept: INTERNAL MEDICINE | Facility: CLINIC | Age: 55
End: 2020-05-28

## 2020-05-28 VITALS
SYSTOLIC BLOOD PRESSURE: 134 MMHG | HEART RATE: 83 BPM | BODY MASS INDEX: 26.29 KG/M2 | WEIGHT: 187.81 LBS | DIASTOLIC BLOOD PRESSURE: 76 MMHG | OXYGEN SATURATION: 98 % | TEMPERATURE: 97 F | HEIGHT: 71 IN

## 2020-05-28 DIAGNOSIS — M25.512 ACUTE PAIN OF LEFT SHOULDER: Primary | ICD-10-CM

## 2020-05-28 DIAGNOSIS — M25.512 ACUTE PAIN OF LEFT SHOULDER: ICD-10-CM

## 2020-05-28 PROBLEM — Z12.11 SCREENING FOR COLON CANCER: Status: RESOLVED | Noted: 2017-11-10 | Resolved: 2020-05-28

## 2020-05-28 PROCEDURE — 99999 PR PBB SHADOW E&M-EST. PATIENT-LVL III: ICD-10-PCS | Mod: PBBFAC,,, | Performed by: NURSE PRACTITIONER

## 2020-05-28 PROCEDURE — 73030 XR SHOULDER COMPLETE 2 OR MORE VIEWS LEFT: ICD-10-PCS | Mod: 26,LT,, | Performed by: RADIOLOGY

## 2020-05-28 PROCEDURE — 3078F PR MOST RECENT DIASTOLIC BLOOD PRESSURE < 80 MM HG: ICD-10-PCS | Mod: CPTII,S$GLB,, | Performed by: NURSE PRACTITIONER

## 2020-05-28 PROCEDURE — 3075F SYST BP GE 130 - 139MM HG: CPT | Mod: CPTII,S$GLB,, | Performed by: NURSE PRACTITIONER

## 2020-05-28 PROCEDURE — 73030 X-RAY EXAM OF SHOULDER: CPT | Mod: TC,LT

## 2020-05-28 PROCEDURE — 99214 OFFICE O/P EST MOD 30 MIN: CPT | Mod: S$GLB,,, | Performed by: NURSE PRACTITIONER

## 2020-05-28 PROCEDURE — 3008F BODY MASS INDEX DOCD: CPT | Mod: CPTII,S$GLB,, | Performed by: NURSE PRACTITIONER

## 2020-05-28 PROCEDURE — 3078F DIAST BP <80 MM HG: CPT | Mod: CPTII,S$GLB,, | Performed by: NURSE PRACTITIONER

## 2020-05-28 PROCEDURE — 99999 PR PBB SHADOW E&M-EST. PATIENT-LVL III: CPT | Mod: PBBFAC,,, | Performed by: NURSE PRACTITIONER

## 2020-05-28 PROCEDURE — 3008F PR BODY MASS INDEX (BMI) DOCUMENTED: ICD-10-PCS | Mod: CPTII,S$GLB,, | Performed by: NURSE PRACTITIONER

## 2020-05-28 PROCEDURE — 3075F PR MOST RECENT SYSTOLIC BLOOD PRESS GE 130-139MM HG: ICD-10-PCS | Mod: CPTII,S$GLB,, | Performed by: NURSE PRACTITIONER

## 2020-05-28 PROCEDURE — 99214 PR OFFICE/OUTPT VISIT, EST, LEVL IV, 30-39 MIN: ICD-10-PCS | Mod: S$GLB,,, | Performed by: NURSE PRACTITIONER

## 2020-05-28 PROCEDURE — 73030 X-RAY EXAM OF SHOULDER: CPT | Mod: 26,LT,, | Performed by: RADIOLOGY

## 2020-05-28 RX ORDER — MELOXICAM 15 MG/1
15 TABLET ORAL DAILY
Qty: 14 TABLET | Refills: 0 | Status: SHIPPED | OUTPATIENT
Start: 2020-05-28 | End: 2020-08-06

## 2020-05-28 NOTE — PROGRESS NOTES
Subjective:       Patient ID: Marco A Perez is a 55 y.o. male.    Chief Complaint: Shoulder Pain (Lt. sholder pain)    HPI    Pt with L shoulder pain off and on x 2 weeks. Denies inciting event. 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 No hx of arthritis. Taking tylenol/ibuprofen. Helps somewhat. Pain worse at night. Denies weakness,numbness/tingling of L arm. Pain is 8/10, throbbing, worse with ROM    Past Medical History:   Diagnosis Date    HTN (hypertension)     Hyperlipidemia     Migraine        Past Surgical History:   Procedure Laterality Date    COLONOSCOPY N/A 11/10/2017    Procedure: COLONOSCOPY;  Surgeon: Reed Penny MD;  Location: [facility];  Service: Endoscopy;  Laterality: N/A;     Social History     Socioeconomic History    Marital status:      Spouse name: Not on file    Number of children: Not on file    Years of education: Not on file    Highest education level: Not on file   Occupational History    Not on file   Social Needs    Financial resource strain: Not on file    Food insecurity:     Worry: Not on file     Inability: Not on file    Transportation needs:     Medical: Not on file     Non-medical: Not on file   Tobacco Use    Smoking status: Never Smoker    Smokeless tobacco: Never Used   Substance and Sexual Activity    Alcohol use: No    Drug use: No    Sexual activity: Not on file   Lifestyle    Physical activity:     Days per week: Not on file     Minutes per session: Not on file    Stress: Not on file   Relationships    Social connections:     Talks on phone: Not on file     Gets together: Not on file     Attends Restorationism service: Not on file     Active member of club or organization: Not on file     Attends meetings of clubs or organizations: Not on file     Relationship status: Not on file   Other Topics Concern    Not on file   Social History Narrative    Not on file     Review of patient's allergies indicates:  No Known Allergies  Current Outpatient Medications    Medication Sig    amLODIPine (NORVASC) 5 MG tablet Take 1 tablet (5 mg total) by mouth once daily.    aspirin (ECOTRIN) 81 MG EC tablet Take 81 mg by mouth once daily.    butalbital-acetaminophen-caffeine -40 mg (FIORICET, ESGIC) -40 mg per tablet TAKE ONE TO TWO TABLETS BY MOUTH EVERY 4 HOURS AS NEEDED    hydroCHLOROthiazide (HYDRODIURIL) 25 MG tablet Take 1 tablet (25 mg total) by mouth once daily.    pravastatin (PRAVACHOL) 40 MG tablet Take 1 tablet (40 mg total) by mouth once daily.    sildenafil (VIAGRA) 100 MG tablet take 1/2 to 1 tablet by mouth one hour prior to intercourse. Max 100 mg per 24 hours    meloxicam (MOBIC) 15 MG tablet Take 1 tablet (15 mg total) by mouth once daily.     No current facility-administered medications for this visit.            Review of Systems   Constitutional: Negative for activity change, chills, diaphoresis, fatigue, fever and unexpected weight change.   HENT: Negative for congestion, ear pain, postnasal drip, rhinorrhea, sinus pressure, sinus pain, sneezing, sore throat, tinnitus, trouble swallowing and voice change.    Eyes: Negative for photophobia, pain and visual disturbance.   Respiratory: Negative for cough, chest tightness, shortness of breath and wheezing.    Cardiovascular: Negative for chest pain, palpitations and leg swelling.   Gastrointestinal: Negative for abdominal distention, abdominal pain, constipation, diarrhea, nausea and vomiting.   Genitourinary: Negative for decreased urine volume, difficulty urinating, dysuria, flank pain, frequency, hematuria and urgency.   Musculoskeletal: Positive for arthralgias and myalgias. Negative for back pain, joint swelling, neck pain and neck stiffness.   Allergic/Immunologic: Negative for immunocompromised state.   Neurological: Negative for dizziness, tremors, seizures, syncope, facial asymmetry, speech difficulty, weakness, light-headedness, numbness and headaches.   Hematological: Negative for  adenopathy. Does not bruise/bleed easily.   Psychiatric/Behavioral: Negative for confusion and sleep disturbance.       Objective:      Physical Exam   Constitutional: He is oriented to person, place, and time.   Cardiovascular: Normal rate, regular rhythm, normal heart sounds and intact distal pulses.   Pulmonary/Chest: Effort normal and breath sounds normal.   Musculoskeletal:        Left shoulder: He exhibits decreased range of motion, tenderness and pain.   Neurological: He is alert and oriented to person, place, and time.   Skin: Skin is warm and dry. Capillary refill takes less than 2 seconds.   Psychiatric: He has a normal mood and affect.       Assessment:     Vitals:    05/28/20 1457   BP: 134/76   Pulse: 83   Temp: 96.8 °F (36 °C)         1. Acute pain of left shoulder        Plan:   Acute pain of left shoulder  -     X-Ray Shoulder 2 or More Views Left; Future; Expected date: 05/28/2020  -     meloxicam (MOBIC) 15 MG tablet; Take 1 tablet (15 mg total) by mouth once daily.  Dispense: 14 tablet; Refill: 0        Consider PT/ortho referral if no improvement

## 2020-05-28 NOTE — TELEPHONE ENCOUNTER
----- Message from Boom Fitzgerald sent at 5/28/2020 10:10 AM CDT -----  Contact: PT   Type:  Sooner Apoointment Request    Caller is requesting a sooner appointment.  Caller declined first available appointment listed below.  Caller will not accept being placed on the waitlist and is requesting a message be sent to doctor.  Name of Caller:Pt   When is the first available appointment?n/a   Symptoms: LT shoulder joint pain   Would the patient rather a call back or a response via [email or web address]? Call back   Best Call Back Number: [phone]  Additional Information: n/a

## 2020-07-07 DIAGNOSIS — I10 ESSENTIAL HYPERTENSION: ICD-10-CM

## 2020-07-07 RX ORDER — HYDROCHLOROTHIAZIDE 25 MG/1
25 TABLET ORAL DAILY
Qty: 30 TABLET | Refills: 0 | Status: SHIPPED | OUTPATIENT
Start: 2020-07-07 | End: 2020-07-10 | Stop reason: SDUPTHER

## 2020-07-07 NOTE — TELEPHONE ENCOUNTER
----- Message from Omar Osuna sent at 7/7/2020 11:16 AM CDT -----  Regarding: Patient  Type:  RX Refill Request    Who Called: pt  Refill or New Rx:refill  RX Name and Strength:hydroCHLOROthiazide (HYDRODIURIL) 25 MG tablet  How is the patient currently taking it? (ex. 1XDay):1x  Is this a 30 day or 90 day RX:90  Preferred Pharmacy with phone number:  [facility] [address] [address] [id number] [facility] [address]  Phone: [phone] Fax: [phone]  Local or Mail Order:local   Ordering Provider:laurent  Would the patient rather a call back or a response via [facility]? Call back   Best Call Back Number:[phone] (home)   Additional Information: patient has been out of the medication for 3 weeks

## 2020-07-10 ENCOUNTER — OFFICE VISIT (OUTPATIENT)
Dept: CARDIOLOGY | Facility: CLINIC | Age: 55
End: 2020-07-10
Payer: COMMERCIAL

## 2020-07-10 ENCOUNTER — LAB VISIT (OUTPATIENT)
Dept: LAB | Facility: HOSPITAL | Age: 55
End: 2020-07-10
Attending: INTERNAL MEDICINE
Payer: COMMERCIAL

## 2020-07-10 VITALS
BODY MASS INDEX: 26.14 KG/M2 | OXYGEN SATURATION: 99 % | DIASTOLIC BLOOD PRESSURE: 84 MMHG | HEART RATE: 71 BPM | SYSTOLIC BLOOD PRESSURE: 128 MMHG | WEIGHT: 187.38 LBS

## 2020-07-10 DIAGNOSIS — G43.809 OTHER MIGRAINE WITHOUT STATUS MIGRAINOSUS, NOT INTRACTABLE: ICD-10-CM

## 2020-07-10 DIAGNOSIS — E78.49 OTHER HYPERLIPIDEMIA: Chronic | ICD-10-CM

## 2020-07-10 DIAGNOSIS — G47.33 OSA (OBSTRUCTIVE SLEEP APNEA): ICD-10-CM

## 2020-07-10 DIAGNOSIS — I10 ESSENTIAL HYPERTENSION: Primary | ICD-10-CM

## 2020-07-10 DIAGNOSIS — I10 ESSENTIAL HYPERTENSION: ICD-10-CM

## 2020-07-10 LAB
ANION GAP SERPL CALC-SCNC: 7 MMOL/L (ref 8–16)
BUN SERPL-MCNC: 16 MG/DL (ref 6–20)
CALCIUM SERPL-MCNC: 9.4 MG/DL (ref 8.7–10.5)
CHLORIDE SERPL-SCNC: 101 MMOL/L (ref 95–110)
CO2 SERPL-SCNC: 31 MMOL/L (ref 23–29)
CREAT SERPL-MCNC: 1.7 MG/DL (ref 0.5–1.4)
EST. GFR  (AFRICAN AMERICAN): 51.7 ML/MIN/1.73 M^2
EST. GFR  (NON AFRICAN AMERICAN): 44.7 ML/MIN/1.73 M^2
GLUCOSE SERPL-MCNC: 90 MG/DL (ref 70–110)
MAGNESIUM SERPL-MCNC: 2.2 MG/DL (ref 1.6–2.6)
POTASSIUM SERPL-SCNC: 4 MMOL/L (ref 3.5–5.1)
SODIUM SERPL-SCNC: 139 MMOL/L (ref 136–145)

## 2020-07-10 PROCEDURE — 3079F PR MOST RECENT DIASTOLIC BLOOD PRESSURE 80-89 MM HG: ICD-10-PCS | Mod: CPTII,S$GLB,, | Performed by: INTERNAL MEDICINE

## 2020-07-10 PROCEDURE — 3074F PR MOST RECENT SYSTOLIC BLOOD PRESSURE < 130 MM HG: ICD-10-PCS | Mod: CPTII,S$GLB,, | Performed by: INTERNAL MEDICINE

## 2020-07-10 PROCEDURE — 3008F BODY MASS INDEX DOCD: CPT | Mod: CPTII,S$GLB,, | Performed by: INTERNAL MEDICINE

## 2020-07-10 PROCEDURE — 3079F DIAST BP 80-89 MM HG: CPT | Mod: CPTII,S$GLB,, | Performed by: INTERNAL MEDICINE

## 2020-07-10 PROCEDURE — 80048 BASIC METABOLIC PNL TOTAL CA: CPT

## 2020-07-10 PROCEDURE — 99999 PR PBB SHADOW E&M-EST. PATIENT-LVL III: ICD-10-PCS | Mod: PBBFAC,,, | Performed by: INTERNAL MEDICINE

## 2020-07-10 PROCEDURE — 99999 PR PBB SHADOW E&M-EST. PATIENT-LVL III: CPT | Mod: PBBFAC,,, | Performed by: INTERNAL MEDICINE

## 2020-07-10 PROCEDURE — 3008F PR BODY MASS INDEX (BMI) DOCUMENTED: ICD-10-PCS | Mod: CPTII,S$GLB,, | Performed by: INTERNAL MEDICINE

## 2020-07-10 PROCEDURE — 83735 ASSAY OF MAGNESIUM: CPT

## 2020-07-10 PROCEDURE — 99214 OFFICE O/P EST MOD 30 MIN: CPT | Mod: S$GLB,,, | Performed by: INTERNAL MEDICINE

## 2020-07-10 PROCEDURE — 3074F SYST BP LT 130 MM HG: CPT | Mod: CPTII,S$GLB,, | Performed by: INTERNAL MEDICINE

## 2020-07-10 PROCEDURE — 36415 COLL VENOUS BLD VENIPUNCTURE: CPT

## 2020-07-10 PROCEDURE — 99214 PR OFFICE/OUTPT VISIT, EST, LEVL IV, 30-39 MIN: ICD-10-PCS | Mod: S$GLB,,, | Performed by: INTERNAL MEDICINE

## 2020-07-10 RX ORDER — HYDROCHLOROTHIAZIDE 25 MG/1
25 TABLET ORAL DAILY
Qty: 90 TABLET | Refills: 1 | Status: SHIPPED | OUTPATIENT
Start: 2020-07-10 | End: 2021-02-02 | Stop reason: SDUPTHER

## 2020-07-10 NOTE — PROGRESS NOTES
Subjective:   Patient ID:  Marco A Perez is a 55 y.o. male who presents for cardiac consult of Follow-up      HPI  The patient came in today for cardiac consult of Follow-up      Marco A Perez is a 55 y.o. male with current medical conditions HTN, HLD,  Migraines presents for for follow up CV eval.      3/5/19  BP is much more elevated today than prior visits. Thinks it may be due to white coat HTN. Normally at home BP is 140s/60s-70s. Pt said he has not been to sleep study yet but he does snore and his wife has been trying to have him evaluated for JUAN. He had some salty food this AM - sausage.     7/10/20  PT has not been seen since 3/2019. He cancelled appt in past due to COVID. He had out of HCTZ but refilled now. He has no recent labs.     Patient feels no chest pain, no sob, no leg swelling, no PND, no palpitation, no dizziness, no syncope, no CNS symptoms.    Patient has fairly good exercise tolerance.  Works as  for [employer].     Patient is compliant with medications.    FH - Mother - DM, ESRD / Father - HTN, MI      ECG - NSR, nonspec T wave change, LVH      CONCLUSIONS     1 - Concentric hypertrophy.     2 - No wall motion abnormalities.     3 - Normal left ventricular systolic function (EF 60-65%).     4 - Normal left ventricular diastolic function.     5 - Normal right ventricular systolic function .     6 - The estimated PA systolic pressure is greater than 23 mmHg.     7 - Mild tricuspid regurgitation.         This document has been electronically    SIGNED BY: Gurpreet Johnson MD On: 03/14/2019 05:09        Past Medical History:   Diagnosis Date    HTN (hypertension)     Hyperlipidemia     Migraine        Past Surgical History:   Procedure Laterality Date    COLONOSCOPY N/A 11/10/2017    Procedure: COLONOSCOPY;  Surgeon: Reed Penny MD;  Location: [facility];  Service: Endoscopy;  Laterality: N/A;       Social History     Tobacco Use    Smoking status: Never Smoker    Smokeless tobacco:  Never Used   Substance Use Topics    Alcohol use: No    Drug use: No       Family History   Problem Relation Age of Onset    Heart disease Mother     Heart disease Father     Diabetes Sister        Patient's Medications   New Prescriptions    No medications on file   Previous Medications    AMLODIPINE (NORVASC) 5 MG TABLET    Take 1 tablet (5 mg total) by mouth once daily.    ASPIRIN (ECOTRIN) 81 MG EC TABLET    Take 81 mg by mouth once daily.    BUTALBITAL-ACETAMINOPHEN-CAFFEINE -40 MG (FIORICET, ESGIC) -40 MG PER TABLET    TAKE ONE TO TWO TABLETS BY MOUTH EVERY 4 HOURS AS NEEDED    HYDROCHLOROTHIAZIDE (HYDRODIURIL) 25 MG TABLET    Take 1 tablet (25 mg total) by mouth once daily.    MELOXICAM (MOBIC) 15 MG TABLET    Take 1 tablet (15 mg total) by mouth once daily.    PRAVASTATIN (PRAVACHOL) 40 MG TABLET    Take 1 tablet (40 mg total) by mouth once daily.    SILDENAFIL (VIAGRA) 100 MG TABLET    take 1/2 to 1 tablet by mouth one hour prior to intercourse. Max 100 mg per 24 hours   Modified Medications    No medications on file   Discontinued Medications    No medications on file       Review of Systems   Constitutional: Negative.    HENT: Negative.    Eyes: Negative.    Respiratory: Negative.    Cardiovascular: Negative.    Gastrointestinal: Negative.    Genitourinary: Negative.    Musculoskeletal: Negative.    Skin: Negative.    Neurological: Negative.    Endo/Heme/Allergies: Negative.    Psychiatric/Behavioral: Negative.    All 12 systems otherwise negative.      Wt Readings from Last 3 Encounters:   07/10/20 85 kg (187 lb 6.3 oz)   05/28/20 85.2 kg (187 lb 13.3 oz)   05/04/20 85.8 kg (189 lb 2.5 oz)     Temp Readings from Last 3 Encounters:   05/28/20 96.8 °F (36 °C) (Tympanic)   05/04/20 98.2 °F (36.8 °C) (Oral)   11/18/19 98.3 °F (36.8 °C) (Tympanic)     BP Readings from Last 3 Encounters:   07/10/20 128/84   05/28/20 134/76   05/04/20 126/84     Pulse Readings from Last 3 Encounters:   07/10/20  71   05/28/20 83   05/04/20 85       /84 (BP Location: Left arm, Patient Position: Sitting, BP Method: Medium (Manual))   Pulse 71   Wt 85 kg (187 lb 6.3 oz)   SpO2 99%   BMI 26.14 kg/m²     Objective:   Physical Exam   Constitutional: He is oriented to person, place, and time. He appears well-developed and well-nourished. No distress.   HENT:   Head: Normocephalic and atraumatic.   Nose: Nose normal.   Mouth/Throat: Oropharynx is clear and moist.   Eyes: Conjunctivae and EOM are normal. No scleral icterus.   Neck: Normal range of motion. Neck supple. No JVD present. No thyromegaly present.   Cardiovascular: Normal rate, regular rhythm, S1 normal and S2 normal. Exam reveals no gallop, no S3, no S4 and no friction rub.   No murmur heard.  Pulmonary/Chest: Effort normal and breath sounds normal. No stridor. No respiratory distress. He has no wheezes. He has no rales. He exhibits no tenderness.   Abdominal: Soft. Bowel sounds are normal. He exhibits no distension and no mass. There is no abdominal tenderness. There is no rebound.   Genitourinary:    Genitourinary Comments: Deferred     Musculoskeletal: Normal range of motion.         General: No tenderness, deformity or edema.   Lymphadenopathy:     He has no cervical adenopathy.   Neurological: He is alert and oriented to person, place, and time. He exhibits normal muscle tone. Coordination normal.   Skin: Skin is warm and dry. No rash noted. He is not diaphoretic. No erythema. No pallor.   Psychiatric: He has a normal mood and affect. His behavior is normal. Judgment and thought content normal.   Nursing note and vitals reviewed.      Lab Results   Component Value Date     11/18/2019    K 3.8 11/18/2019     11/18/2019    CO2 29 11/18/2019    BUN 14 11/18/2019    CREATININE 1.5 (H) 11/18/2019    GLU 82 11/18/2019    AST 17 11/07/2019    ALT 13 11/07/2019    ALBUMIN 4.2 11/07/2019    PROT 7.6 11/07/2019    BILITOT 0.4 11/07/2019    WBC 7.00  11/18/2019    HGB 11.7 (L) 11/18/2019    HCT 37.5 (L) 11/18/2019    MCV 85 11/18/2019     11/18/2019    TSH 1.971 12/03/2012    CHOL 184 11/07/2019    HDL 49 11/07/2019    LDLCALC 117.8 11/07/2019    TRIG 86 11/07/2019     Assessment:      1. Essential hypertension    2. Other hyperlipidemia    3. JUAN (obstructive sleep apnea)    4. Other migraine without status migrainosus, not intractable        Plan:   1. HTN  - cont HCTZ to 25mg  - low salt diet; labs today eval CR and K and mg  - 2D ECHO to eval LVH/valves - negative    2. JUAN symptoms   - refer to sleep study - said was normal at work    3. HLD  - cont statin  - low chetna diet    4. Migraines  - cont tx per PCP    Thank you for allowing me to participate in this patient's care. Please do not hesitate to contact me with any questions or concerns. Consult note has been forwarded to the referral physician.

## 2020-07-16 ENCOUNTER — TELEPHONE (OUTPATIENT)
Dept: CARDIOLOGY | Facility: CLINIC | Age: 55
End: 2020-07-16

## 2020-07-21 ENCOUNTER — TELEPHONE (OUTPATIENT)
Dept: CARDIOLOGY | Facility: CLINIC | Age: 55
End: 2020-07-21

## 2020-07-21 NOTE — TELEPHONE ENCOUNTER
Spoke with patient gave lab results and directions pt states understanding. ----- Message from Pb Norris MD sent at 7/13/2020 10:33 AM CDT -----  Please call the patient regarding his abnormal result. Kidney function mildly worse than prior labs, follow up with PCP, drink fluids and if feeling dry hold the HCTZ (diuretic).

## 2020-07-22 ENCOUNTER — TELEPHONE (OUTPATIENT)
Dept: INTERNAL MEDICINE | Facility: CLINIC | Age: 55
End: 2020-07-22

## 2020-08-06 ENCOUNTER — OFFICE VISIT (OUTPATIENT)
Dept: INTERNAL MEDICINE | Facility: CLINIC | Age: 55
End: 2020-08-06
Payer: COMMERCIAL

## 2020-08-06 ENCOUNTER — LAB VISIT (OUTPATIENT)
Dept: LAB | Facility: HOSPITAL | Age: 55
End: 2020-08-06
Attending: FAMILY MEDICINE
Payer: COMMERCIAL

## 2020-08-06 VITALS
BODY MASS INDEX: 26.11 KG/M2 | RESPIRATION RATE: 16 BRPM | SYSTOLIC BLOOD PRESSURE: 122 MMHG | TEMPERATURE: 98 F | HEIGHT: 71 IN | DIASTOLIC BLOOD PRESSURE: 80 MMHG | OXYGEN SATURATION: 97 % | WEIGHT: 186.5 LBS | HEART RATE: 74 BPM

## 2020-08-06 DIAGNOSIS — D64.9 ANEMIA, UNSPECIFIED TYPE: ICD-10-CM

## 2020-08-06 DIAGNOSIS — N28.9 RENAL INSUFFICIENCY: Primary | ICD-10-CM

## 2020-08-06 DIAGNOSIS — N28.9 RENAL INSUFFICIENCY: ICD-10-CM

## 2020-08-06 DIAGNOSIS — Z11.59 NEED FOR HEPATITIS C SCREENING TEST: ICD-10-CM

## 2020-08-06 LAB
ANION GAP SERPL CALC-SCNC: 5 MMOL/L (ref 8–16)
BASOPHILS # BLD AUTO: 0.04 K/UL (ref 0–0.2)
BASOPHILS NFR BLD: 1 % (ref 0–1.9)
BUN SERPL-MCNC: 11 MG/DL (ref 6–20)
CALCIUM SERPL-MCNC: 10 MG/DL (ref 8.7–10.5)
CHLORIDE SERPL-SCNC: 103 MMOL/L (ref 95–110)
CO2 SERPL-SCNC: 33 MMOL/L (ref 23–29)
CREAT SERPL-MCNC: 1.5 MG/DL (ref 0.5–1.4)
DIFFERENTIAL METHOD: ABNORMAL
EOSINOPHIL # BLD AUTO: 0 K/UL (ref 0–0.5)
EOSINOPHIL NFR BLD: 1 % (ref 0–8)
ERYTHROCYTE [DISTWIDTH] IN BLOOD BY AUTOMATED COUNT: 13.7 % (ref 11.5–14.5)
EST. GFR  (AFRICAN AMERICAN): >60 ML/MIN/1.73 M^2
EST. GFR  (NON AFRICAN AMERICAN): 52 ML/MIN/1.73 M^2
GLUCOSE SERPL-MCNC: 85 MG/DL (ref 70–110)
HCT VFR BLD AUTO: 43.1 % (ref 40–54)
HGB BLD-MCNC: 13.4 G/DL (ref 14–18)
IMM GRANULOCYTES # BLD AUTO: 0 K/UL (ref 0–0.04)
IMM GRANULOCYTES NFR BLD AUTO: 0 % (ref 0–0.5)
LYMPHOCYTES # BLD AUTO: 1.7 K/UL (ref 1–4.8)
LYMPHOCYTES NFR BLD: 43.7 % (ref 18–48)
MCH RBC QN AUTO: 26.7 PG (ref 27–31)
MCHC RBC AUTO-ENTMCNC: 31.1 G/DL (ref 32–36)
MCV RBC AUTO: 86 FL (ref 82–98)
MONOCYTES # BLD AUTO: 0.4 K/UL (ref 0.3–1)
MONOCYTES NFR BLD: 9 % (ref 4–15)
NEUTROPHILS # BLD AUTO: 1.8 K/UL (ref 1.8–7.7)
NEUTROPHILS NFR BLD: 45.3 % (ref 38–73)
NRBC BLD-RTO: 0 /100 WBC
PLATELET # BLD AUTO: 334 K/UL (ref 150–350)
PMV BLD AUTO: 10.8 FL (ref 9.2–12.9)
POTASSIUM SERPL-SCNC: 3.9 MMOL/L (ref 3.5–5.1)
RBC # BLD AUTO: 5.01 M/UL (ref 4.6–6.2)
SODIUM SERPL-SCNC: 141 MMOL/L (ref 136–145)
WBC # BLD AUTO: 3.87 K/UL (ref 3.9–12.7)

## 2020-08-06 PROCEDURE — 36415 COLL VENOUS BLD VENIPUNCTURE: CPT

## 2020-08-06 PROCEDURE — 99214 PR OFFICE/OUTPT VISIT, EST, LEVL IV, 30-39 MIN: ICD-10-PCS | Mod: S$GLB,,, | Performed by: FAMILY MEDICINE

## 2020-08-06 PROCEDURE — 3079F DIAST BP 80-89 MM HG: CPT | Mod: CPTII,S$GLB,, | Performed by: FAMILY MEDICINE

## 2020-08-06 PROCEDURE — 3074F PR MOST RECENT SYSTOLIC BLOOD PRESSURE < 130 MM HG: ICD-10-PCS | Mod: CPTII,S$GLB,, | Performed by: FAMILY MEDICINE

## 2020-08-06 PROCEDURE — 3008F BODY MASS INDEX DOCD: CPT | Mod: CPTII,S$GLB,, | Performed by: FAMILY MEDICINE

## 2020-08-06 PROCEDURE — 3008F PR BODY MASS INDEX (BMI) DOCUMENTED: ICD-10-PCS | Mod: CPTII,S$GLB,, | Performed by: FAMILY MEDICINE

## 2020-08-06 PROCEDURE — 83020 HEMOGLOBIN ELECTROPHORESIS: CPT

## 2020-08-06 PROCEDURE — 85025 COMPLETE CBC W/AUTO DIFF WBC: CPT

## 2020-08-06 PROCEDURE — 99214 OFFICE O/P EST MOD 30 MIN: CPT | Mod: S$GLB,,, | Performed by: FAMILY MEDICINE

## 2020-08-06 PROCEDURE — 3074F SYST BP LT 130 MM HG: CPT | Mod: CPTII,S$GLB,, | Performed by: FAMILY MEDICINE

## 2020-08-06 PROCEDURE — 80048 BASIC METABOLIC PNL TOTAL CA: CPT

## 2020-08-06 PROCEDURE — 3079F PR MOST RECENT DIASTOLIC BLOOD PRESSURE 80-89 MM HG: ICD-10-PCS | Mod: CPTII,S$GLB,, | Performed by: FAMILY MEDICINE

## 2020-08-06 PROCEDURE — 99999 PR PBB SHADOW E&M-EST. PATIENT-LVL IV: ICD-10-PCS | Mod: PBBFAC,,, | Performed by: FAMILY MEDICINE

## 2020-08-06 PROCEDURE — 99999 PR PBB SHADOW E&M-EST. PATIENT-LVL IV: CPT | Mod: PBBFAC,,, | Performed by: FAMILY MEDICINE

## 2020-08-06 NOTE — PROGRESS NOTES
Subjective:       Patient ID: Marco A Perez is a 55 y.o. male.    Chief Complaint: Follow-up (Abnl labs )    HPIreck creat card [employer] creat more;no nsaid use  Was referred by card for nicole but was told by someone he didn't need  Anemia has lab to f/u on this lab;iron studies ok  htn controlled    Migraines doing ok    Past Medical History:   Diagnosis Date    HTN (hypertension)     Hyperlipidemia     Migraine      Past Surgical History:   Procedure Laterality Date    COLONOSCOPY N/A 11/10/2017    Procedure: COLONOSCOPY;  Surgeon: Reed Penny MD;  Location: [facility];  Service: Endoscopy;  Laterality: N/A;     Family History   Problem Relation Age of Onset    Heart disease Mother     Heart disease Father     Diabetes Sister      Social History     Socioeconomic History    Marital status:      Spouse name: Not on file    Number of children: Not on file    Years of education: Not on file    Highest education level: Not on file   Occupational History    Not on file   Social Needs    Financial resource strain: Not on file    Food insecurity     Worry: Not on file     Inability: Not on file    Transportation needs     Medical: No     Non-medical: No   Tobacco Use    Smoking status: Never Smoker    Smokeless tobacco: Never Used   Substance and Sexual Activity    Alcohol use: No    Drug use: No    Sexual activity: Not on file   Lifestyle    Physical activity     Days per week: Not on file     Minutes per session: Not on file    Stress: Not on file   Relationships    Social connections     Talks on phone: Three times a week     Gets together: Patient refused     Attends Congregation service: Not on file     Active member of club or organization: No     Attends meetings of clubs or organizations: Never     Relationship status:    Other Topics Concern    Not on file   Social History Narrative    Not on file       Review of Systems    Cardiovascular: no chest pain  Chest: no shortness of  breath  Abd: no abd pain  Remainder review of systems negative    Objective:      Physical Exam  Constitutional:       Appearance: Normal appearance.   Neck:      Musculoskeletal: Normal range of motion and neck supple.   Cardiovascular:      Rate and Rhythm: Normal rate and regular rhythm.   Pulmonary:      Effort: Pulmonary effort is normal.      Breath sounds: Normal breath sounds.   Neurological:      Mental Status: He is alert.         Assessment:       1. Renal insufficiency    2. Need for hepatitis C screening test    3. Anemia, unspecified type      htn  ?nicole  Plan:       *an lab; if still anemia then hemat also  nephro  Also has nov lab and f/u**    Avoid nsaids    Please combine nov labs and add hep c;cancel hemoglobin electropher. Then and if he is able then bmp , cbc, hemoglob electropheresis today    Renal insufficiency  -     Ambulatory referral/consult to Nephrology; Future; Expected date: 08/13/2020  -     Basic metabolic panel; Future; Expected date: 08/06/2020  -     US Kidney; Future; Expected date: 08/06/2020    Need for hepatitis C screening test  -     Hepatitis C Antibody; Future; Expected date: 08/06/2020    Anemia, unspecified type  -     CBC auto differential; Future; Expected date: 08/06/2020  -     Hemoglobin Electrophoresis,Hgb A2 Clifford.; Future; Expected date: 08/06/2020

## 2020-08-07 LAB
HGB A2 MFR BLD HPLC: 2.3 % (ref 2.2–3.2)
HGB FRACT BLD ELPH-IMP: NORMAL
HGB FRACT BLD ELPH-IMP: NORMAL

## 2020-08-10 ENCOUNTER — TELEPHONE (OUTPATIENT)
Dept: RADIOLOGY | Facility: HOSPITAL | Age: 55
End: 2020-08-10

## 2020-08-11 ENCOUNTER — HOSPITAL ENCOUNTER (OUTPATIENT)
Dept: RADIOLOGY | Facility: HOSPITAL | Age: 55
Discharge: HOME | End: 2020-08-11
Attending: FAMILY MEDICINE
Payer: COMMERCIAL

## 2020-08-11 DIAGNOSIS — N28.9 RENAL INSUFFICIENCY: ICD-10-CM

## 2020-08-11 PROCEDURE — 76770 US RETROPERITONEAL COMPLETE: ICD-10-PCS | Mod: 26,,, | Performed by: RADIOLOGY

## 2020-08-11 PROCEDURE — 76770 US EXAM ABDO BACK WALL COMP: CPT | Mod: 26,,, | Performed by: RADIOLOGY

## 2020-08-11 PROCEDURE — 76770 US EXAM ABDO BACK WALL COMP: CPT | Mod: TC

## 2020-08-19 ENCOUNTER — OFFICE VISIT (OUTPATIENT)
Dept: NEPHROLOGY | Facility: CLINIC | Age: 55
End: 2020-08-19
Payer: COMMERCIAL

## 2020-08-19 VITALS
DIASTOLIC BLOOD PRESSURE: 82 MMHG | SYSTOLIC BLOOD PRESSURE: 134 MMHG | HEART RATE: 78 BPM | BODY MASS INDEX: 26.29 KG/M2 | HEIGHT: 71 IN | WEIGHT: 187.81 LBS

## 2020-08-19 DIAGNOSIS — N28.9 RENAL INSUFFICIENCY: ICD-10-CM

## 2020-08-19 PROCEDURE — 3008F BODY MASS INDEX DOCD: CPT | Mod: CPTII,S$GLB,, | Performed by: INTERNAL MEDICINE

## 2020-08-19 PROCEDURE — 3075F PR MOST RECENT SYSTOLIC BLOOD PRESS GE 130-139MM HG: ICD-10-PCS | Mod: CPTII,S$GLB,, | Performed by: INTERNAL MEDICINE

## 2020-08-19 PROCEDURE — 3079F DIAST BP 80-89 MM HG: CPT | Mod: CPTII,S$GLB,, | Performed by: INTERNAL MEDICINE

## 2020-08-19 PROCEDURE — 99205 OFFICE O/P NEW HI 60 MIN: CPT | Mod: S$GLB,,, | Performed by: INTERNAL MEDICINE

## 2020-08-19 PROCEDURE — 99999 PR PBB SHADOW E&M-EST. PATIENT-LVL IV: ICD-10-PCS | Mod: PBBFAC,,, | Performed by: INTERNAL MEDICINE

## 2020-08-19 PROCEDURE — 99999 PR PBB SHADOW E&M-EST. PATIENT-LVL IV: CPT | Mod: PBBFAC,,, | Performed by: INTERNAL MEDICINE

## 2020-08-19 PROCEDURE — 3075F SYST BP GE 130 - 139MM HG: CPT | Mod: CPTII,S$GLB,, | Performed by: INTERNAL MEDICINE

## 2020-08-19 PROCEDURE — 3008F PR BODY MASS INDEX (BMI) DOCUMENTED: ICD-10-PCS | Mod: CPTII,S$GLB,, | Performed by: INTERNAL MEDICINE

## 2020-08-19 PROCEDURE — 99205 PR OFFICE/OUTPT VISIT, NEW, LEVL V, 60-74 MIN: ICD-10-PCS | Mod: S$GLB,,, | Performed by: INTERNAL MEDICINE

## 2020-08-19 PROCEDURE — 3079F PR MOST RECENT DIASTOLIC BLOOD PRESSURE 80-89 MM HG: ICD-10-PCS | Mod: CPTII,S$GLB,, | Performed by: INTERNAL MEDICINE

## 2020-08-19 NOTE — PROGRESS NOTES
NEPHROLOGY CONSULTATION    PHYSICIAN REQUESTING THE CONSULT: Dr. Anders Linton    REASON FOR CONSULTATION: Renal insufficiency    55 y.o. male with history of HTN, hyperlipidemia, migraines, sleep apnea presents to the renal clinic for evaluation of renal insufficiency. A consultation has been requested by the patient's PMD, Dr. Anders Linton. Patient today presents to the clinic complaining of intermittent headaches. He denies any congenital hearing loss, chest pain, SOB, hemoptysis, abdominal or flank pain, diarrhea, nausea/vomiting, hematuria, dysuria, LE swelling, rashes, hand/foot paresthesia, nasal congestion. Patient denies NSAID use history.   Patient has a longstanding history of HTN that was diagnosed > 12 years ago. BP is currently controlled at 134/82. In addition, patient reports history of migraines (last episode about 8 months ago, uses Fioricet), sleep apnea (patient denies this, has never taken a sleep test). He denies any history of nephrolithiasis, diabetes mellitus, heart disease. Patient reports that his mother had been on hemodilaysis. Laboratory review revealed that the patient's renal function has been stable with creatinine ranging from 1.4 to 1.5.             Past Medical History:   Diagnosis Date    HTN (hypertension)     Hyperlipidemia     Migraine        Past Surgical History:   Procedure Laterality Date    COLONOSCOPY N/A 11/10/2017    Procedure: COLONOSCOPY;  Surgeon: Reed Penny MD;  Location: [facility];  Service: Endoscopy;  Laterality: N/A;       Review of patient's allergies indicates:   Allergen Reactions    Nsaids (non-steroidal anti-inflammatory drug)      Renal insuff       Current Outpatient Medications   Medication Sig Dispense Refill    amLODIPine (NORVASC) 5 MG tablet Take 1 tablet (5 mg total) by mouth once daily. 30 tablet 11    aspirin (ECOTRIN) 81 MG EC tablet Take 81 mg by mouth once daily.      butalbital-acetaminophen-caffeine -40 mg (FIORICET,  ESGIC) -40 mg per tablet TAKE ONE TO TWO TABLETS BY MOUTH EVERY 4 HOURS AS NEEDED 30 tablet 1    hydroCHLOROthiazide (HYDRODIURIL) 25 MG tablet Take 1 tablet (25 mg total) by mouth once daily. 90 tablet 1    pravastatin (PRAVACHOL) 40 MG tablet Take 1 tablet (40 mg total) by mouth once daily. 90 tablet 3    sildenafil (VIAGRA) 100 MG tablet take 1/2 to 1 tablet by mouth one hour prior to intercourse. Max 100 mg per 24 hours 30 tablet 11     No current facility-administered medications for this visit.        Family History   Problem Relation Age of Onset    Heart disease Mother     Heart disease Father     Diabetes Sister        Social History     Socioeconomic History    Marital status:      Spouse name: Not on file    Number of children: Not on file    Years of education: Not on file    Highest education level: Not on file   Occupational History    Not on file   Social Needs    Financial resource strain: Not on file    Food insecurity     Worry: Not on file     Inability: Not on file    Transportation needs     Medical: No     Non-medical: No   Tobacco Use    Smoking status: Never Smoker    Smokeless tobacco: Never Used   Substance and Sexual Activity    Alcohol use: No    Drug use: No    Sexual activity: Not on file   Lifestyle    Physical activity     Days per week: Not on file     Minutes per session: Not on file    Stress: Not on file   Relationships    Social connections     Talks on phone: Three times a week     Gets together: Patient refused     Attends L[employer] service: Not on file     Active member of club or organization: No     Attends meetings of clubs or organizations: Never     Relationship status:    Other Topics Concern    Not on file   Social History Narrative    Not on file       Review of Systems:  1. GENERAL: patient denies any fever, weight changes, generalized weakness, dizziness.  2. HEENT: patient reports intermittent headaches, no visual  "disturbances, swallowing problems, sinus pain, nasal congestion.  3. CARDIOVASCULAR: patient denies chest pain, palpitations.  4. PULMONARY: patient denies SOB, coughing, hemoptysis, wheezing.  5. GASTROINTESTINAL: patient denies abdominal pain, nausea, vomiting, diarrhea.  6. GENITOURINARY: patient denies dysuria, hematuria, hesitancy, frequency.  7. EXTREMITIES: patient denies LE edema, LE cramping.  8. DERMATOLOGY: patient denies rashes, ulcers.  9. NEURO: patient denies tremors, extremity weakness, extremity numbness/tingling.  10. MUSCULOSKELETAL: patient denies joint pain, joint swelling.  11. HEMATOLOGY: patient denies rectal or gum bleeding.  12: PSYCH: patient denies anxiety, depression.      PHYSICAL EXAM:  /82   Pulse 78   Ht 5' 11" (1.803 m)   Wt 85.2 kg (187 lb 13.3 oz)   BMI 26.20 kg/m²     GENERAL: Pleasant  Muscular gentleman presents to clinic with non-labored breathing.  HEENT: PER, no nasal discharge, no icterus, no oral exudates, moist mucosal membranes.  NECK: no thyroid mass, no lymphadenopathy.  HEART: RRR S1/S2, no rubs, good peripheral pulses.  LUNGS: CTA bilaterally, no wheezing, breathing is nonlabored.  ABDOMEN: soft, nontender, not distended, bowel sounds are present, no abdominal hernia.  EXTREM: no LE edema.  SKIN: no rashes, skin is warm and dry.  NEURO: A & O x 3, no obvious focal signs.    LABORATORY RESULTS:    Lab Results   Component Value Date    CREATININE 1.5 (H) 08/06/2020    BUN 11 08/06/2020     08/06/2020    K 3.9 08/06/2020     08/06/2020    CO2 33 (H) 08/06/2020      Lab Results   Component Value Date    CALCIUM 10.0 08/06/2020     Lab Results   Component Value Date    ALBUMIN 4.2 11/07/2019     Lab Results   Component Value Date    WBC 3.87 (L) 08/06/2020    HGB 13.4 (L) 08/06/2020    HCT 43.1 08/06/2020    MCV 86 08/06/2020     08/06/2020     Urinalysis: no protein, no blood (12/29/14)      ASSESSMENT AND PLAN:  55 y.o.  male with history of " "HTN, hyperlipidemia, migraines, sleep apnea presents to the renal clinic for evaluation of renal insufficiency.    1. Renal insufficiency: Patient presents with elevated creatinine of 1.5. Given his muscular physique, it is likely that a creatinine level of 1.5 is "normal" for this patient and his eGFR is actually > 60 (which is nowadays considered to a normal level). Will get cystatin C level to confirm this. In addition, will order urinalysis to check for any proteinuria/hematuria. Renal US from 8/11/20 was unremarkable.  Patient's renal function will be monitored closely and he will return to the clinic in 1 month for follow up. I will order a renal panel, CBC, urinalysis, protein creatinine ratio, PTH prior to his return visit. Patient was advised to avoid NSAID pain medications such as advil, aleve, motrin, ibuprofen, naprosyn, meloxicam, diclofenac, mobic and to hydrate with 70-90 ounces of water per day.     2. Electrolytes: Within normal limits.    3. Acid base status: No acute issues.    4. Volume: Euvolemic.     5. Hypertension: Good BP control.     6. Medications: Reviewed. Agree with current medical regimen.     7. Anemia: Will monitor.        Thank you very much for this consult. Please see my note in Epic for recommendations.    Total time spent was about 45 min. 50 % or more of time was spent on counseling patient about treatment options and educating patient about disease etiology. Complex case. Level 5 consult.               "

## 2020-08-19 NOTE — LETTER
August 19, 2020      Anders Linton MD  [address] [place] [address]           [facility]  [address] [facility] [place] [address]  Phone: [phone]  Fax: [phone]          Patient: Marco A Perez   MR Number: [id number]   YOB: 1965   Date of Visit: 8/19/2020       Dear Dr. Anders Linton:    Thank you for referring Marco A Perez to me for evaluation. Attached you will find relevant portions of my assessment and plan of care.    If you have questions, please do not hesitate to call me. I look forward to following Marco A Perez along with you.    Sincerely,    Jag Acevedo MD    Enclosure  CC:  No Recipients    If you would like to receive this communication electronically, please contact [email or web address] or [phone] to request more information on [facility] Link access.    For providers and/or their staff who would like to refer a patient to [facility], please contact us through our one-stop-shop provider referral line, [facility], at [phone].    If you feel you have received this communication in error or would no longer like to receive these types of communications, please e-mail [email or web address]

## 2020-08-19 NOTE — PATIENT INSTRUCTIONS
Avoid NSAID pain medications such as advil, aleve, motrin, ibuprofen, naprosyn, meloxicam, diclofenac, mobic.     Hydrate with 70-90 ounces of water per day.

## 2020-09-29 ENCOUNTER — PATIENT MESSAGE (OUTPATIENT)
Dept: INTERNAL MEDICINE | Facility: CLINIC | Age: 55
End: 2020-09-29

## 2020-09-29 ENCOUNTER — OFFICE VISIT (OUTPATIENT)
Dept: INTERNAL MEDICINE | Facility: CLINIC | Age: 55
End: 2020-09-29
Payer: COMMERCIAL

## 2020-09-29 ENCOUNTER — HOSPITAL ENCOUNTER (OUTPATIENT)
Dept: RADIOLOGY | Facility: HOSPITAL | Age: 55
Discharge: HOME | End: 2020-09-29
Attending: PHYSICIAN ASSISTANT
Payer: COMMERCIAL

## 2020-09-29 VITALS
WEIGHT: 184.75 LBS | DIASTOLIC BLOOD PRESSURE: 72 MMHG | TEMPERATURE: 97 F | HEIGHT: 71 IN | SYSTOLIC BLOOD PRESSURE: 118 MMHG | OXYGEN SATURATION: 98 % | HEART RATE: 74 BPM | BODY MASS INDEX: 25.86 KG/M2

## 2020-09-29 DIAGNOSIS — R10.12 INTERMITTENT LEFT UPPER QUADRANT ABDOMINAL PAIN: Primary | ICD-10-CM

## 2020-09-29 DIAGNOSIS — M25.512 ACUTE PAIN OF LEFT SHOULDER: ICD-10-CM

## 2020-09-29 DIAGNOSIS — R10.12 INTERMITTENT LEFT UPPER QUADRANT ABDOMINAL PAIN: ICD-10-CM

## 2020-09-29 PROCEDURE — 3074F PR MOST RECENT SYSTOLIC BLOOD PRESSURE < 130 MM HG: ICD-10-PCS | Mod: CPTII,S$GLB,, | Performed by: PHYSICIAN ASSISTANT

## 2020-09-29 PROCEDURE — 74019 XR ABDOMEN FLAT AND ERECT: ICD-10-PCS | Mod: 26,,, | Performed by: RADIOLOGY

## 2020-09-29 PROCEDURE — 3078F PR MOST RECENT DIASTOLIC BLOOD PRESSURE < 80 MM HG: ICD-10-PCS | Mod: CPTII,S$GLB,, | Performed by: PHYSICIAN ASSISTANT

## 2020-09-29 PROCEDURE — 74019 RADEX ABDOMEN 2 VIEWS: CPT | Mod: 26,,, | Performed by: RADIOLOGY

## 2020-09-29 PROCEDURE — 99214 PR OFFICE/OUTPT VISIT, EST, LEVL IV, 30-39 MIN: ICD-10-PCS | Mod: 25,S$GLB,, | Performed by: PHYSICIAN ASSISTANT

## 2020-09-29 PROCEDURE — 99999 PR PBB SHADOW E&M-EST. PATIENT-LVL IV: CPT | Mod: PBBFAC,,, | Performed by: PHYSICIAN ASSISTANT

## 2020-09-29 PROCEDURE — 99999 PR PBB SHADOW E&M-EST. PATIENT-LVL IV: ICD-10-PCS | Mod: PBBFAC,,, | Performed by: PHYSICIAN ASSISTANT

## 2020-09-29 PROCEDURE — 3074F SYST BP LT 130 MM HG: CPT | Mod: CPTII,S$GLB,, | Performed by: PHYSICIAN ASSISTANT

## 2020-09-29 PROCEDURE — 99214 OFFICE O/P EST MOD 30 MIN: CPT | Mod: 25,S$GLB,, | Performed by: PHYSICIAN ASSISTANT

## 2020-09-29 PROCEDURE — 96372 PR INJECTION,THERAP/PROPH/DIAG2ST, IM OR SUBCUT: ICD-10-PCS | Mod: S$GLB,,, | Performed by: PHYSICIAN ASSISTANT

## 2020-09-29 PROCEDURE — 74019 RADEX ABDOMEN 2 VIEWS: CPT | Mod: TC

## 2020-09-29 PROCEDURE — 96372 THER/PROPH/DIAG INJ SC/IM: CPT | Mod: S$GLB,,, | Performed by: PHYSICIAN ASSISTANT

## 2020-09-29 PROCEDURE — 3078F DIAST BP <80 MM HG: CPT | Mod: CPTII,S$GLB,, | Performed by: PHYSICIAN ASSISTANT

## 2020-09-29 PROCEDURE — 3008F PR BODY MASS INDEX (BMI) DOCUMENTED: ICD-10-PCS | Mod: CPTII,S$GLB,, | Performed by: PHYSICIAN ASSISTANT

## 2020-09-29 PROCEDURE — 3008F BODY MASS INDEX DOCD: CPT | Mod: CPTII,S$GLB,, | Performed by: PHYSICIAN ASSISTANT

## 2020-09-29 RX ORDER — METHYLPREDNISOLONE ACETATE 40 MG/ML
60 INJECTION, SUSPENSION INTRA-ARTICULAR; INTRALESIONAL; INTRAMUSCULAR; SOFT TISSUE ONCE
Status: COMPLETED | OUTPATIENT
Start: 2020-09-29 | End: 2020-09-29

## 2020-09-29 RX ADMIN — METHYLPREDNISOLONE ACETATE 60 MG: 40 INJECTION, SUSPENSION INTRA-ARTICULAR; INTRALESIONAL; INTRAMUSCULAR; SOFT TISSUE at 09:09

## 2020-09-29 NOTE — PATIENT INSTRUCTIONS
Kidney Stone, Undescended, No Symptoms    A kidney stone (nephrolithiasis) begins as tiny crystals that form inside the kidney where urine is made. Most kidney stones enlarge to about 1/8 to 1/4 inch in size before leaving the kidney and moving toward the bladder. There are 4 types of kidney stones. Eighty percent are calcium stones--mostly calcium oxalate but also some with calcium phosphate. The other 3 types include uric acid stones, struvite stones (from a preceding infection), and rarely, cystine stones.  When the stone breaks free and begins to move down the ureter (the narrow tube joining the kidney to the bladder) it often causes sharp back and side pain, often with nausea and vomiting. When the stone reaches the bladder, the pain stops. Once in your bladder, the kidney stone may pass through the urethra (urinary opening) while you are urinating (which may cause pain to start again). Or, it may break into such small fragments that you dont notice it passing.  Your kidney stone is still inside the kidney. There is no way to predict how long it will be before it breaks free and causes any symptoms. Most stones will pass on their own within a few hours to a few days (sometimes longer). You may notice a red, pink, or brown color to your urine. This is normal while passing a kidney stone. A large stone may not pass on its own and may require special procedures to remove it. These procedures include lithotripsy, which uses ultrasound waves to break up the stone; ureteroscopy, which pushes a thin, basket-like instrument through the urethra and bladder and into the ureter to pull out the stone; and various types of direct surgery through the skin.  Home care  The following guidelines will help you care for yourself at home:  · Drink plenty of fluids. This increases urine flow and reduces the risk of further stone formation. Healthy adults (no heart/liver/kidney disease) who have had a kidney stone should drink  12, 8-ounce glasses of fluids per day. Most of this should be water. The goal is to produce 1.5 to 2 quarts of almost colorless urine per 24 hours.  · You should collect your urine in a container and then drain it through a strainer to collect any stones or pieces of stones. Take these to your healthcare provider to help identify your specific type of stone to aid in future treatment and dietary changes.  · Try to stay as active as possible since this will help the stone pass. Don't stay in bed unless you have pain that prevents you from getting up.  · If you develop pain, you may take ibuprofen or naproxen for pain, unless another medicine was prescribed. If you have chronic liver or kidney disease or ever had a stomach ulcer or GI bleeding, talk with your healthcare provider before using these medicines.  Prevention  Each year, there is a 5% to 10% chance that a new stone will form (50% chance over the next 5 to 7 years). The risk is higher if you have a family history of kidney stones or have certain chronic illnesses such as hypertension, obesity, or diabetes. However, there are lifestyle and dietary changes that you can make to reduce the risk of a recurrence.  Most kidney stones are made of calcium. The following is advice for preventing a recurrence of calcium stones.  If you dont know the type of stone you have, follow this advice until the cause of your stone is determined.  Things that help:  · The most important thing you can do is to drink plenty of fluids each day, as described above.   · Certain foods, such as wheat, rice, rye, barley and beans, contain phytate, a compound that may lower the risk of recurrence of any type of stone.  · Eat more fruits and vegetables (especially those high in potassium).  · Eat foods high in natural citrate like fruit and fruit juices (using low sugar).  · Low calcium contributes to the formation of calcium type kidney stones. Eat a normal calcium diet and speak with  your doctor if you are taking calcium supplements. It may be detrimental to reduce your calcium intake. New research shows that eating calcium-rich and oxalate-rich foods together lowers your risk of stones by binding the minerals in the stomach and intestines before they can reach the kidneys.  · Limit salt intake to 2 grams (1 teaspoon) per day. Use limited amounts when cooking, and dont add salt at the table. Processed and canned foods are usually high in salt.  · Spinach, rhubarb, peanuts, cashews and almonds, grapefruit and grapefruit juice are all high oxalate foods and should be reduced, or eaten with calcium rich foods. These foods include dairy, dark leafy greens, soy products, and calcium enriched foods.  · Reducing the amount of animal meat in your diet may lower your risk of uric acid stones.  · Avoid excess sugar (sucrose) and fructose (sweetener in many soft drinks) in your diet.   · If you take vitamin C as a supplement, do not take more than 1,000 milligrams (mg) per day.  · A dietitian or your healthcare provider can provide you with specific details about dietary changes to prevent kidney stone recurrence.  Follow-up care  Follow up with your healthcare provider, or as advised. Talk with your healthcare provider about urine and blood tests to find out the cause of your stone.  If you had an X-ray, CT scan, or other diagnostic test, you will be notified of any findings that may affect your care.  Call 911  Call 911 if you have any of these:  · Weakness, dizziness, or fainting  When to seek medical advice  Call your healthcare provider right away if any of the these occur:  · Severe sharp back or side pain  · Repeated vomiting or unable to keep down fluids  · Fever of 100.4ºF (38ºC) or higher, or as directed by your health care provider  · Blood (pink or red color) in your urine  · Foul smelling or cloudy urine  · Unable to pass urine for 8 hours or increasing bladder pressure  Date Last Reviewed:  10/1/2016  © 9603-2675 [employer]. [address] [place] [address], [place] [address]. All rights reserved. This information is not intended as a substitute for professional medical care. Always follow your healthcare professional's instructions.        Treating Kidney Stones: Expectant Therapy  Most kidney stones are about the size of a grape seed. Stones of this size are small enough to pass naturally. Once it is passed, a stone can be analyzed. This wait-and-see approach is called expectant therapy. Small stones can often be passed with expectant therapy. If pain is a problem, ask your healthcare provider about pain medicines. Then follow his or her directions on how much water to drink. Drinking more water creates more urine to flush out your stone. Also be sure to strain your urine. Take any stones you pass to your provider for analysis.    Drink lots of water  Drinking lots of water may help your stone pass. Water also dilutes the chemicals in your urine. This reduces your risk of forming new stones. You may be told to drink 8, 12-ounce glasses of water a day. Avoid liquids that dehydrate you, such as those containing caffeine or alcohol.  Strain your urine  Straining your urine lets you collect your stone for analysis. Use the strainer each time you urinate. Strain your urine for as long as your healthcare provider suggests. Watch for brown, tan, gold, or black specks or tiny lino. These may be kidney stones.  Take your medicine  Your healthcare provider may give you medicine that makes it more likely for you to pass the kidney stone.   Follow up with your healthcare provider  Follow up by taking any stones you find to your provider for analysis. The type of stone you have determines your diet and prevention program. You may need more tests in the future. These tests will ensure that new stones are not forming.  Date Last Reviewed: 1/1/2017  © 6652-4818 [employer]. [address] [place] [address]  Road, [place] [address]. All rights reserved. This information is not intended as a substitute for professional medical care. Always follow your healthcare professional's instructions.        Understanding Kidney Stones  Your kidneys are bean-shaped organs. They help filter extra salts, waste, and water from your body. You need to drink enough water every day to help flush the extra salts into your urine.     What are kidney stones?  Kidney stones are made up of chemical crystals that separate out from urine. These crystals clump together to make stones. They form in the calyx of the kidney. They may stay in the kidney or move into the urinary tract.   Why kidney stones form  Kidneys form stones for many reasons. If you dont drink enough water, for instance, you wont have enough urine to dilute chemicals. Then the chemicals may form crystals, which can develop into stones. Here are some reasons why kidney stones form:  · Fluid loss (dehydration). This can concentrate urine, causing stones to form.  · Certain foods. Some foods contain large amounts of the chemicals that sometimes crystallize into stones. Eating foods that contain a lot of meat or salt can lead to more kidney stones.  · Kidney infections. These infections foster stones by slowing urine flow or changing the acid balance of your urine.  · Family history. If family members have had kidney stones, youre more likely to have them, too.  · A lack of certain substances in your urine. Some substances can help protect you from forming stones. If you dont have enough of these in your urine, stone formation can increase.  Where stones form  Stones begin in the cup-shaped part of the kidney (calyx). Some stay in the calyx and grow. Others move into the kidney, pelvis, or into the ureter. There they can lodge, block the flow of urine, and cause pain.  Symptoms  Many stones cause sudden and severe pain and bloody urine. Others cause nausea or frequent, burning  urination. Symptoms often depend on your stones size and location. Fever may indicate a serious infection. Call your healthcare provider right away if you develop a fever.  Date Last Reviewed: 1/1/2017 © 2000-2017 [employer]. [address] [place] [address], [place] [address]. All rights reserved. This information is not intended as a substitute for professional medical care. Always follow your healthcare professional's instructions.        Preventing Kidney Stones  If youve had a kidney stone, you may worry that youll have another. Removing or passing your stone doesnt prevent future stones. But with your healthcare providers help, you can reduce your risk of forming new stones. Follow up with your healthcare provider to help find new stones. You may need follow-up every 3 months to a year for a lifetime.    Drink lots of water  Staying well-hydrated is the best way to reduce your risk of future stones. Drink 8 12-ounce glasses of water daily. Have 2 with each meal and 2 between meals. Try keeping a pitcher of water nearby during the day and at night.  Take medicines if needed  Medicines, including vitamins and minerals, may be prescribed for certain types of stones. You may want to write your doses and medicine times on a calendar. Some medicines decrease stone-forming chemicals in your blood. Others help prevent those chemicals from crystallizing in urine. Still others help keep a normal acid balance in your urine.  Follow your prescribed diet  Your healthcare provider will tell you which foods contain the chemicals you should avoid. Your healthcare provider may also suggest talking to a dietitian. He or she can help you plan meals youll enjoy. These meals wont put you at risk for future stones. You may be told to limit certain foods, depending on which type of stones youve had. You should limit the amount of salt in your food to about 2 grams a day. This will help prevent most types of kidney  stones. Make sure you get an adequate amount of calcium in your diet.  For calcium oxalate stones: Limit animal protein, such as meat, eggs, and fish. Limit grapefruit juice and alcohol. Limit high-oxalate foods (such as cola, tea, chocolate, spinach, rhubarb, wheat bran, and peanuts).  For uric acid stones: Limit high-purine foods, such as mushrooms, peas, beans, anchovies, meat, poultry, shellfish, and organ meats. These foods increase uric acid production.  For cystine stones: Limit high-methionine foods (fish is the most common, but eggs and meats, also). These foods increase production of cystine.  Date Last Reviewed: 2/1/2017  © 2423-6553 [employer]. [address] [place] [address], [place] [address]. All rights reserved. This information is not intended as a substitute for professional medical care. Always follow your healthcare professional's instructions.

## 2020-09-29 NOTE — PATIENT INSTRUCTIONS
Constipation (Adult)  Constipation means that you have bowel movements that are less frequent than usual. Stools often become very hard and difficult to pass.  Constipation is very common. At some point in life it affects almost everyone. Since everyone's bowel habits are different, what is constipation to one person may not be to another. Your healthcare provider may do tests to diagnose constipation. It depends on what he or she finds when evaluating you.    Symptoms of constipation include:  · Abdominal pain  · Bloating  · Vomiting  · Painful bowel movements  · Itching, swelling, bleeding, or pain around the anus  Causes  Constipation can have many causes. These include:  · Diet low in fiber  · Too much dairy  · Not drinking enough liquids  · Lack of exercise or physical activity. This is especially true for older adults.  · Changes in lifestyle or daily routine, including pregnancy, aging, work, and travel  · Frequent use or misuse of laxatives  · Ignoring the urge to have a bowel movement or delaying it until later  · Medicines, such as certain prescription pain medicines, iron supplements, antacids, certain antidepressants, and calcium supplements  · Diseases like irritable bowel syndrome, bowel obstructions, stroke, diabetes, thyroid disease, Parkinson disease, hemorrhoids, and colon cancer  Complications  Potential complications of constipation can include:  · Hemorrhoids  · Rectal bleeding from hemorrhoids or anal fissures (skin tears)  · Hernias  · Dependency on laxatives  · Chronic constipation  · Fecal impaction  · Bowel obstruction or perforation  Home care  All treatment should be done after talking with your healthcare provider. This is especially true if you have another medical problems, are taking prescription medicines, or are an older adult. Treatment most often involves lifestyle changes. You may also need medicines. Your healthcare provider will tell you which will work best for you. Follow  the advice below to help avoid this problem in the future.  Lifestyle changes  These lifestyle changes can help prevent constipation:  · Diet. Eat a high-fiber diet, with fresh fruit and vegetables, and reduce dairy intake, meats, and processed foods  · Fluids. It's important to get enough fluids each day. Drink plenty of water when you eat more fiber. If you are on diet that limits the amount of fluid you can have, talk about this with your healthcare provider.  · Regular exercise. Check with your healthcare provider first.  Medications  Take any medicines as directed. Some laxatives are safe to use only every now and then. Others can be taken on a regular basis. Talk with your doctor or pharmacist if you have questions.  Prescription pain medicines can cause constipation. If you are taking this kind of medicine, ask your healthcare provider if you should also take a stool softener.  Medicines you may take to treat constipation include:  · Fiber supplements  · Stool softeners  · Laxatives  · Enemas  · Rectal suppositories  Follow-up care  Follow up with your healthcare provider if symptoms don't get better in the next few days. You may need to have more tests or see a specialist.  Call 911  Call 911 if any of these occur:  · Trouble breathing  · Stiff, rigid abdomen that is severely painful to touch  · Confusion  · Fainting or loss of consciousness  · Rapid heart rate  · Chest pain  When to seek medical advice  Call your healthcare provider right away if any of these occur:  · Fever over 100.4°F (38°C)  · Failure to resume normal bowel movements  · Pain in your abdomen or back gets worse  · Nausea or vomiting  · Swelling in your abdomen  · Blood in the stool  · Black, tarry stool  · Involuntary weight loss  · Weakness  Date Last Reviewed: 12/30/2015  © 7223-9366 [employer]. [address] [place] [address], [place] [address]. All rights reserved. This information is not intended as a substitute for  professional medical care. Always follow your healthcare professional's instructions.

## 2020-09-29 NOTE — PROGRESS NOTES
Subjective:      Patient ID: Marco A Perez is a 55 y.o. male.    Chief Complaint: Flank Pain (left)    Abdominal Pain  This is a new problem. The current episode started in the past 7 days. The problem occurs intermittently. The problem has been waxing and waning. The pain is located in the LUQ. The pain is moderate. The quality of the pain is cramping and sharp. The abdominal pain radiates to the back. Associated symptoms include arthralgias. Pertinent negatives include no anorexia, belching, constipation, diarrhea, dysuria, fever, flatus, frequency, headaches, hematochezia, hematuria, melena, myalgias, nausea, vomiting or weight loss. The pain is aggravated by certain positions. The pain is relieved by nothing. He has tried nothing for the symptoms. There is no history of abdominal surgery, colon cancer, Crohn's disease, gallstones, GERD, irritable bowel syndrome, pancreatitis, PUD or ulcerative colitis. Patient's medical history does not include kidney stones and UTI.     Left lower rib pain for the past week. Pain is worse with leaning to the left side. Feels like something is pushing up on his ribs from his stomach on the left side. No urinary symstoms. No rash.     Additionally pt requesting a shot for his left shoulder pain. He was prescribed meloxicam in the past which helped. Recently his labs indicated renal insufficiency. Cannot take NSAIDS.     Review of Systems   Constitutional: Negative for activity change, appetite change, chills, diaphoresis, fatigue, fever, unexpected weight change and weight loss.   HENT: Negative.  Negative for congestion, hearing loss, postnasal drip, rhinorrhea, sore throat, trouble swallowing and voice change.    Eyes: Negative.  Negative for visual disturbance.   Respiratory: Negative.  Negative for cough, choking, chest tightness and shortness of breath.    Cardiovascular: Negative for chest pain, palpitations and leg swelling.   Gastrointestinal: Positive for abdominal pain.  "Negative for abdominal distention, anorexia, blood in stool, constipation, diarrhea, flatus, hematochezia, melena, nausea and vomiting.   Endocrine: Negative for cold intolerance, heat intolerance, polydipsia and polyuria.   Genitourinary: Negative.  Negative for difficulty urinating, dysuria, frequency and hematuria.   Musculoskeletal: Positive for arthralgias. Negative for back pain, gait problem, joint swelling and myalgias.   Skin: Negative for color change, pallor, rash and wound.   Neurological: Negative for dizziness, tremors, weakness, light-headedness, numbness and headaches.   Hematological: Negative for adenopathy.   Psychiatric/Behavioral: Negative for behavioral problems, confusion, self-injury, sleep disturbance and suicidal ideas. The patient is not nervous/anxious.      Objective:   /72 (BP Location: Right arm, Patient Position: Sitting, BP Method: Large (Manual))   Pulse 74   Temp 96.5 °F (35.8 °C) (Tympanic)   Ht 5' 11" (1.803 m)   Wt 83.8 kg (184 lb 11.9 oz)   SpO2 98%   BMI 25.77 kg/m²     Physical Exam  Vitals signs reviewed.   Constitutional:       Appearance: He is well-developed.   HENT:      Head: Normocephalic and atraumatic.      Right Ear: External ear normal.      Left Ear: External ear normal.      Nose: Nose normal.   Eyes:      Conjunctiva/sclera: Conjunctivae normal.      Pupils: Pupils are equal, round, and reactive to light.   Neck:      Musculoskeletal: Normal range of motion and neck supple.   Cardiovascular:      Rate and Rhythm: Normal rate and regular rhythm.      Heart sounds: Normal heart sounds. No murmur. No friction rub. No gallop.    Pulmonary:      Effort: Pulmonary effort is normal. No respiratory distress.      Breath sounds: Normal breath sounds. No wheezing or rales.   Chest:      Chest wall: No tenderness.   Abdominal:      General: Bowel sounds are increased. There is no distension.      Palpations: Abdomen is soft.      Tenderness: There is abdominal " tenderness in the left upper quadrant. There is no right CVA tenderness, left CVA tenderness, guarding or rebound. Negative signs include Montenegro's sign and Rovsing's sign.       Musculoskeletal: Normal range of motion.      Left shoulder: He exhibits tenderness and pain. He exhibits normal range of motion, no bony tenderness, no swelling, no effusion, no crepitus, no deformity, no laceration, no spasm, normal pulse and normal strength.   Lymphadenopathy:      Cervical: No cervical adenopathy.   Skin:     General: Skin is warm and dry.   Neurological:      Mental Status: He is alert and oriented to person, place, and time.   Psychiatric:         Behavior: Behavior normal.         Thought Content: Thought content normal.         Judgment: Judgment normal.       Lab Results   Component Value Date    CREATININE 1.5 (H) 08/06/2020    BUN 11 08/06/2020     08/06/2020    K 3.9 08/06/2020     08/06/2020    CO2 33 (H) 08/06/2020     EXAMINATION:  XR SHOULDER COMPLETE 2 OR MORE VIEWS LEFT     CLINICAL HISTORY:  Pain in left shoulder     TECHNIQUE:  Two or three views of the left shoulder were performed.     COMPARISON:  None     FINDINGS:  Degenerative changes noted at the AC and glenohumeral joints with the minimal inferior spur formation at the glenohumeral joint and mild to moderate inferior spur formation at the AC joint and cortical irregularity along the inferior margin of the acromion.  No fracture or dislocation.  No soft tissue calcification or foreign body.  Remaining osseous structures intact.  Visualized left lung field is clear.     Impression:     Degenerative changes with inferior spur formation at the AC and glenohumeral joints.     No fracture or dislocation.  See above.        Electronically signed by: Amadou Lora MD  Date:                                            05/28/2020  Time:                                           15:29    Assessment:     1. Intermittent left upper quadrant abdominal  pain    2. Acute pain of left shoulder      Plan:   Intermittent left upper quadrant abdominal pain  -     X-Ray Abdomen Flat And Erect; Future; Expected date: 09/29/2020  -heating pad  -miralax for 3 days  -increase fluids    Acute pain of left shoulder  -     methylPREDNISolone acetate injection 60 mg  -recommend pt/ot, pt declined, would like to try the shot first  -no NSAIDS  -RICE    Follow up if symptoms worsen or fail to improve.

## 2020-10-06 ENCOUNTER — PATIENT OUTREACH (OUTPATIENT)
Dept: ADMINISTRATIVE | Facility: OTHER | Age: 55
End: 2020-10-06

## 2020-10-06 ENCOUNTER — OFFICE VISIT (OUTPATIENT)
Dept: NEPHROLOGY | Facility: CLINIC | Age: 55
End: 2020-10-06
Payer: COMMERCIAL

## 2020-10-06 VITALS
WEIGHT: 181.88 LBS | DIASTOLIC BLOOD PRESSURE: 62 MMHG | BODY MASS INDEX: 25.46 KG/M2 | SYSTOLIC BLOOD PRESSURE: 96 MMHG | HEART RATE: 80 BPM | HEIGHT: 71 IN

## 2020-10-06 DIAGNOSIS — N28.9 RENAL INSUFFICIENCY: Primary | ICD-10-CM

## 2020-10-06 PROCEDURE — 99213 OFFICE O/P EST LOW 20 MIN: CPT | Mod: S$GLB,,, | Performed by: INTERNAL MEDICINE

## 2020-10-06 PROCEDURE — 3008F PR BODY MASS INDEX (BMI) DOCUMENTED: ICD-10-PCS | Mod: CPTII,S$GLB,, | Performed by: INTERNAL MEDICINE

## 2020-10-06 PROCEDURE — 3074F PR MOST RECENT SYSTOLIC BLOOD PRESSURE < 130 MM HG: ICD-10-PCS | Mod: CPTII,S$GLB,, | Performed by: INTERNAL MEDICINE

## 2020-10-06 PROCEDURE — 99999 PR PBB SHADOW E&M-EST. PATIENT-LVL III: CPT | Mod: PBBFAC,,, | Performed by: INTERNAL MEDICINE

## 2020-10-06 PROCEDURE — 3078F DIAST BP <80 MM HG: CPT | Mod: CPTII,S$GLB,, | Performed by: INTERNAL MEDICINE

## 2020-10-06 PROCEDURE — 99213 PR OFFICE/OUTPT VISIT, EST, LEVL III, 20-29 MIN: ICD-10-PCS | Mod: S$GLB,,, | Performed by: INTERNAL MEDICINE

## 2020-10-06 PROCEDURE — 99999 PR PBB SHADOW E&M-EST. PATIENT-LVL III: ICD-10-PCS | Mod: PBBFAC,,, | Performed by: INTERNAL MEDICINE

## 2020-10-06 PROCEDURE — 3074F SYST BP LT 130 MM HG: CPT | Mod: CPTII,S$GLB,, | Performed by: INTERNAL MEDICINE

## 2020-10-06 PROCEDURE — 3008F BODY MASS INDEX DOCD: CPT | Mod: CPTII,S$GLB,, | Performed by: INTERNAL MEDICINE

## 2020-10-06 PROCEDURE — 3078F PR MOST RECENT DIASTOLIC BLOOD PRESSURE < 80 MM HG: ICD-10-PCS | Mod: CPTII,S$GLB,, | Performed by: INTERNAL MEDICINE

## 2020-10-06 NOTE — PROGRESS NOTES
PROGRESS NOTE FOR ESTABLISHED PATIENT    PHYSICIAN REQUESTING THE CONSULT: Dr. Anders Linton    REASON FOR VISIT: Renal insufficiency      Initial consult note:  55 y.o. male with history of HTN, hyperlipidemia, migraines, sleep apnea presents to the renal clinic for evaluation of renal insufficiency. A consultation has been requested by the patient's PMD, Dr. Anders Linton. Patient today presents to the clinic complaining of intermittent headaches. He denies any congenital hearing loss, chest pain, SOB, hemoptysis, abdominal or flank pain, diarrhea, nausea/vomiting, hematuria, dysuria, LE swelling, rashes, hand/foot paresthesia, nasal congestion. Patient denies NSAID use history.   Patient has a longstanding history of HTN that was diagnosed > 12 years ago. BP is currently controlled at 134/82. In addition, patient reports history of migraines (last episode about 8 months ago, uses Fioricet), sleep apnea (patient denies this, has never taken a sleep test). He denies any history of nephrolithiasis, diabetes mellitus, heart disease. Patient reports that his mother had been on hemodilaysis. Laboratory review revealed that the patient's renal function has been stable with creatinine ranging from 1.4 to 1.5.     October 6, 2020:  Patient has no complaints.             Past Medical History:   Diagnosis Date    HTN (hypertension)     Hyperlipidemia     Migraine        Past Surgical History:   Procedure Laterality Date    COLONOSCOPY N/A 11/10/2017    Procedure: COLONOSCOPY;  Surgeon: Reed Penny MD;  Location: [facility];  Service: Endoscopy;  Laterality: N/A;       Review of patient's allergies indicates:   Allergen Reactions    Nsaids (non-steroidal anti-inflammatory drug)      Renal insuff       Current Outpatient Medications   Medication Sig Dispense Refill    amLODIPine (NORVASC) 5 MG tablet Take 1 tablet (5 mg total) by mouth once daily. 30 tablet 11    aspirin (ECOTRIN) 81 MG EC tablet Take 81 mg by mouth  once daily.      butalbital-acetaminophen-caffeine -40 mg (FIORICET, ESGIC) -40 mg per tablet TAKE ONE TO TWO TABLETS BY MOUTH EVERY 4 HOURS AS NEEDED 30 tablet 1    hydroCHLOROthiazide (HYDRODIURIL) 25 MG tablet Take 1 tablet (25 mg total) by mouth once daily. 90 tablet 1    pravastatin (PRAVACHOL) 40 MG tablet Take 1 tablet (40 mg total) by mouth once daily. 90 tablet 3    sildenafil (VIAGRA) 100 MG tablet take 1/2 to 1 tablet by mouth one hour prior to intercourse. Max 100 mg per 24 hours 30 tablet 11     No current facility-administered medications for this visit.        Family History   Problem Relation Age of Onset    Heart disease Mother     Heart disease Father     Diabetes Sister        Social History     Socioeconomic History    Marital status:      Spouse name: Not on file    Number of children: Not on file    Years of education: Not on file    Highest education level: Not on file   Occupational History    Not on file   Social Needs    Financial resource strain: Not on file    Food insecurity     Worry: Not on file     Inability: Not on file    Transportation needs     Medical: No     Non-medical: No   Tobacco Use    Smoking status: Never Smoker    Smokeless tobacco: Never Used   Substance and Sexual Activity    Alcohol use: No    Drug use: No    Sexual activity: Not on file   Lifestyle    Physical activity     Days per week: Not on file     Minutes per session: Not on file    Stress: Not on file   Relationships    Social connections     Talks on phone: Three times a week     Gets together: Patient refused     Attends Mu-ism service: Not on file     Active member of club or organization: No     Attends meetings of clubs or organizations: Never     Relationship status:    Other Topics Concern    Not on file   Social History Narrative    Not on file       Review of Systems:  1. GENERAL: patient denies any fever, weight changes, generalized weakness,  "dizziness.  2. HEENT: patient reports intermittent headaches, no visual disturbances, swallowing problems, sinus pain, nasal congestion.  3. CARDIOVASCULAR: patient denies chest pain, palpitations.  4. PULMONARY: patient denies SOB, coughing, hemoptysis, wheezing.  5. GASTROINTESTINAL: patient denies abdominal pain, nausea, vomiting, diarrhea.  6. GENITOURINARY: patient denies dysuria, hematuria, hesitancy, frequency.  7. EXTREMITIES: patient denies LE edema, LE cramping.  8. DERMATOLOGY: patient denies rashes, ulcers.  9. NEURO: patient denies tremors, extremity weakness, extremity numbness/tingling.  10. MUSCULOSKELETAL: patient denies joint pain, joint swelling.  11. HEMATOLOGY: patient denies rectal or gum bleeding.  12: PSYCH: patient denies anxiety, depression.      PHYSICAL EXAM:  BP 96/62   Pulse 80   Ht 5' 11" (1.803 m)   Wt 82.5 kg (181 lb 14.1 oz)   BMI 25.37 kg/m²     GENERAL: Pleasant  Muscular gentleman presents to clinic with non-labored breathing.  HEENT: PER, no nasal discharge, no icterus, no oral exudates, moist mucosal membranes.  NECK: no thyroid mass, no lymphadenopathy.  HEART: RRR S1/S2, no rubs, good peripheral pulses.  LUNGS: CTA bilaterally, no wheezing, breathing is nonlabored.  ABDOMEN: soft, nontender, not distended, bowel sounds are present, no abdominal hernia.  EXTREM: no LE edema.  SKIN: no rashes, skin is warm and dry.  NEURO: A & O x 3, no obvious focal signs.    LABORATORY RESULTS:    Lab Results   Component Value Date    CREATININE 1.4 09/29/2020    BUN 14 09/29/2020     09/29/2020    K 4.1 09/29/2020     09/29/2020    CO2 28 09/29/2020      Lab Results   Component Value Date    PTH 42.0 09/29/2020    CALCIUM 9.9 09/29/2020    PHOS 3.2 09/29/2020     Lab Results   Component Value Date    ALBUMIN 4.5 09/29/2020     Lab Results   Component Value Date    WBC 3.75 (L) 09/29/2020    HGB 14.0 09/29/2020    HCT 43.9 09/29/2020    MCV 85 09/29/2020     09/29/2020 " "    Urinalysis: no protein, no blood (9/29/20)      ASSESSMENT AND PLAN:  55 y.o.  male with history of HTN, hyperlipidemia, migraines, sleep apnea presents to the renal clinic for evaluation of renal insufficiency.    1. Renal insufficiency: Patient presents with elevated creatinine of 1.5. Given his muscular physique, it is likely that a creatinine level of 1.5 is "normal" for this patient and his eGFR is actually > 60 (which is nowadays considered to a normal level). Cystatin C revealed that patient's actual GFR is 113.    Patient will be discharged from renal clinic. There is no need for him to be followed by us. Please refer patient back to us if any renal issues develop in the future.    Thank you for your referral.                              "

## 2020-10-22 ENCOUNTER — PATIENT MESSAGE (OUTPATIENT)
Dept: INTERNAL MEDICINE | Facility: CLINIC | Age: 55
End: 2020-10-22

## 2020-10-22 ENCOUNTER — OFFICE VISIT (OUTPATIENT)
Dept: INTERNAL MEDICINE | Facility: CLINIC | Age: 55
End: 2020-10-22
Payer: COMMERCIAL

## 2020-10-22 ENCOUNTER — LAB VISIT (OUTPATIENT)
Dept: LAB | Facility: HOSPITAL | Age: 55
End: 2020-10-22
Attending: PHYSICIAN ASSISTANT
Payer: COMMERCIAL

## 2020-10-22 VITALS
HEART RATE: 71 BPM | SYSTOLIC BLOOD PRESSURE: 114 MMHG | DIASTOLIC BLOOD PRESSURE: 74 MMHG | WEIGHT: 185.63 LBS | HEIGHT: 71 IN | OXYGEN SATURATION: 96 % | BODY MASS INDEX: 25.99 KG/M2 | TEMPERATURE: 97 F

## 2020-10-22 DIAGNOSIS — M62.831 MUSCLE SPASM OF CALF: ICD-10-CM

## 2020-10-22 DIAGNOSIS — I10 ESSENTIAL HYPERTENSION: ICD-10-CM

## 2020-10-22 DIAGNOSIS — M62.831 MUSCLE SPASM OF CALF: Primary | ICD-10-CM

## 2020-10-22 LAB
ALBUMIN SERPL BCP-MCNC: 4.5 G/DL (ref 3.5–5.2)
ALP SERPL-CCNC: 40 U/L (ref 55–135)
ALT SERPL W/O P-5'-P-CCNC: 15 U/L (ref 10–44)
ANION GAP SERPL CALC-SCNC: 10 MMOL/L (ref 8–16)
AST SERPL-CCNC: 15 U/L (ref 10–40)
BILIRUB SERPL-MCNC: 0.4 MG/DL (ref 0.1–1)
BUN SERPL-MCNC: 17 MG/DL (ref 6–20)
CALCIUM SERPL-MCNC: 9.5 MG/DL (ref 8.7–10.5)
CHLORIDE SERPL-SCNC: 103 MMOL/L (ref 95–110)
CO2 SERPL-SCNC: 27 MMOL/L (ref 23–29)
CREAT SERPL-MCNC: 1.5 MG/DL (ref 0.5–1.4)
EST. GFR  (AFRICAN AMERICAN): >60 ML/MIN/1.73 M^2
EST. GFR  (NON AFRICAN AMERICAN): 52 ML/MIN/1.73 M^2
GLUCOSE SERPL-MCNC: 92 MG/DL (ref 70–110)
POTASSIUM SERPL-SCNC: 4.2 MMOL/L (ref 3.5–5.1)
PROT SERPL-MCNC: 7.4 G/DL (ref 6–8.4)
SODIUM SERPL-SCNC: 140 MMOL/L (ref 136–145)

## 2020-10-22 PROCEDURE — 3078F DIAST BP <80 MM HG: CPT | Mod: CPTII,S$GLB,, | Performed by: PHYSICIAN ASSISTANT

## 2020-10-22 PROCEDURE — 99214 OFFICE O/P EST MOD 30 MIN: CPT | Mod: S$GLB,,, | Performed by: PHYSICIAN ASSISTANT

## 2020-10-22 PROCEDURE — 99999 PR PBB SHADOW E&M-EST. PATIENT-LVL IV: ICD-10-PCS | Mod: PBBFAC,,, | Performed by: PHYSICIAN ASSISTANT

## 2020-10-22 PROCEDURE — 80053 COMPREHEN METABOLIC PANEL: CPT

## 2020-10-22 PROCEDURE — 3074F SYST BP LT 130 MM HG: CPT | Mod: CPTII,S$GLB,, | Performed by: PHYSICIAN ASSISTANT

## 2020-10-22 PROCEDURE — 3078F PR MOST RECENT DIASTOLIC BLOOD PRESSURE < 80 MM HG: ICD-10-PCS | Mod: CPTII,S$GLB,, | Performed by: PHYSICIAN ASSISTANT

## 2020-10-22 PROCEDURE — 82728 ASSAY OF FERRITIN: CPT

## 2020-10-22 PROCEDURE — 99214 PR OFFICE/OUTPT VISIT, EST, LEVL IV, 30-39 MIN: ICD-10-PCS | Mod: S$GLB,,, | Performed by: PHYSICIAN ASSISTANT

## 2020-10-22 PROCEDURE — 3074F PR MOST RECENT SYSTOLIC BLOOD PRESSURE < 130 MM HG: ICD-10-PCS | Mod: CPTII,S$GLB,, | Performed by: PHYSICIAN ASSISTANT

## 2020-10-22 PROCEDURE — 99999 PR PBB SHADOW E&M-EST. PATIENT-LVL IV: CPT | Mod: PBBFAC,,, | Performed by: PHYSICIAN ASSISTANT

## 2020-10-22 PROCEDURE — 3008F BODY MASS INDEX DOCD: CPT | Mod: CPTII,S$GLB,, | Performed by: PHYSICIAN ASSISTANT

## 2020-10-22 PROCEDURE — 36415 COLL VENOUS BLD VENIPUNCTURE: CPT

## 2020-10-22 PROCEDURE — 3008F PR BODY MASS INDEX (BMI) DOCUMENTED: ICD-10-PCS | Mod: CPTII,S$GLB,, | Performed by: PHYSICIAN ASSISTANT

## 2020-10-22 NOTE — PATIENT INSTRUCTIONS
Leg Cramps  A muscle cramp or spasm is a strong contraction of the muscle fibers. It is also called a charley horse. This may occur in the foot, calf, or thigh at night when the legs are elevated. If the spasm is prolonged, it can become very painful.  This may be caused by sleeping in an uncomfortable position, muscle fatigue, poor muscle tone from lack of exercise and stretching, dehydration, electrolyte imbalance, diabetes, alcohol use, and certain medicine.  Home care  · Drink plenty of fluids during the day to prevent dehydration.  · Stretch your legs before bedtime.  · Eat a diet high in potassium. These foods include fresh fruit, such as bananas, oranges, cantaloupe, and honeydew melon. It also includes apple, prune, orange, grape and pineapple juices. Other foods high in potassium are white, red, and patel beans, baked potatoes, raw spinach, cod, flounder, halibut, salmon, and scallops.  · Talk with your healthcare provider about taking mineral and vitamin supplements that contain magnesium and vitamin B-12 if you are not already taking these. Other prescription medicines may also be used.  · Avoid stimulants such as caffeine, nicotine, decongestants.  How to relieve an acute leg cramp  · For mild pain, getting out of the bed and walking may help. Some people find relief with heat and massage. You can apply heat with a warm shower, bath, or compress. Some people feel better with a cold packs. You can make an ice pack by filling a plastic bag that seals at the top with ice cubes and then wrapping it with a thin towel. Try both and use the method that feels best for 15 to 20 minutes at a time.  · For severe pain, stretching the muscle that is in spasm may quickly relieve the pain.  · When the spasm is in your foot, your toes may curl up or down. To stretch the muscle in spasm, bend your toes in the opposite direction. If the spasm pulls your toes up, bend them down. If the spasm pulls them down, bend them  up.  · When the spasm is in your calf, bend the ankle so the foot points upward toward your knee.  · When the spasm is in your thigh, bend or straighten the knee and hip until you feel relief.  Follow-up care  Follow up with your healthcare provider, or as advised.  When to seek medical advice  Call your healthcare provider right away if any of these occur:  · Walking makes your pain worse and rest makes it better  · You develop weakness in the affected leg  · Pain or frequency of spasms increases and is not controlled by the above measures  Date Last Reviewed: 11/23/2015 © 2000-2017 [employer]. [address] [place] [address], [place] [address]. All rights reserved. This information is not intended as a substitute for professional medical care. Always follow your healthcare professional's instructions.        Muscle Spasm  A muscle spasm (also called a cramp) is an involuntary muscle contraction. The muscle tightens quickly and strongly. A hard lump may form in the muscle. Muscle spasms are very painful. Read on to learn more about muscle spasms and how to treat and prevent them.    What causes muscles to spasm?  Often, the cause of a muscle spasm is not known. Muscle spasm is due to irritation of muscle fibers. Some things can make a muscle spasm more likely. These include:  · Injury  · Heavy exercise  · Overtired muscles  · A muscle held in one position for a long time  · Dehydration  · Low levels of certain minerals in the body  · Taking certain medications, such as diuretics or water pills  · Certain medical conditions, such as kidney failure or diabetes  · Being pregnant  Stopping a muscle spasm  Muscle spasms often come and go quickly. When a muscle goes into spasm, very gently stretch and massage the muscle. This may help calm the muscle fibers. Then rest the muscle.  Preventing muscle spasms  Although there is little or no evidence that staying hydrated, taking certain vitamins or minerals or  stretching works to prevent cramps, these measures may help and have other benefits. Talk to your health care provider about steps to take to avoid muscle spasms. These may include:  · Drinking enough fluids to avoid dehydration, especially when you exercise.  · Taking vitamin or mineral supplements.  · Getting regular exercise.  · Stretching regularly, especially before exercise.  · Limit caffeine and smoking.  · Taking a prescription muscle relaxant.  When to call your doctor  Call your doctor if you have any of the following:  · Severe cramping  · Cramping that lasts a long time, does not go away with stretching, or keeps coming back  · Pain, tingling, or weakness in the arms or legs  · Pain that wakes you up at night   Date Last Reviewed: 9/1/2015  © 0653-3586 [employer]. [address] [place] [address], [place] [address]. All rights reserved. This information is not intended as a substitute for professional medical care. Always follow your healthcare professional's instructions.

## 2020-10-22 NOTE — PROGRESS NOTES
Subjective:      Patient ID: Marco A Perez is a 55 y.o. male.    Chief Complaint: Leg Pain (left)    Left calf cramping and pain woke him up from sleeping last night. Symptoms improved over time. Symptoms have improved but his left calf still feels sore. No swelling or redness. No trauma. States that he does drink a lot of water. NO redness, swelling, or hyperthermia/hypothermia. This has only happened once. Has not tried any treatment.   Denies any chest pain or shortness of breath.   NO recent travel or trauma.     Patient Active Problem List   Diagnosis    HTN (hypertension)    Migraine    JUAN (obstructive sleep apnea)    Hyperlipidemia       Current Outpatient Medications:     amLODIPine (NORVASC) 5 MG tablet, Take 1 tablet (5 mg total) by mouth once daily., Disp: 30 tablet, Rfl: 11    aspirin (ECOTRIN) 81 MG EC tablet, Take 81 mg by mouth once daily., Disp: , Rfl:     butalbital-acetaminophen-caffeine -40 mg (FIORICET, ESGIC) -40 mg per tablet, TAKE ONE TO TWO TABLETS BY MOUTH EVERY 4 HOURS AS NEEDED, Disp: 30 tablet, Rfl: 1    hydroCHLOROthiazide (HYDRODIURIL) 25 MG tablet, Take 1 tablet (25 mg total) by mouth once daily., Disp: 90 tablet, Rfl: 1    pravastatin (PRAVACHOL) 40 MG tablet, Take 1 tablet (40 mg total) by mouth once daily., Disp: 90 tablet, Rfl: 3    sildenafil (VIAGRA) 100 MG tablet, take 1/2 to 1 tablet by mouth one hour prior to intercourse. Max 100 mg per 24 hours, Disp: 30 tablet, Rfl: 11    Review of Systems   Constitutional: Negative for activity change, appetite change, chills, diaphoresis, fatigue, fever and unexpected weight change.   HENT: Negative.  Negative for congestion, hearing loss, postnasal drip, rhinorrhea, sore throat, trouble swallowing and voice change.    Eyes: Negative.  Negative for visual disturbance.   Respiratory: Negative.  Negative for cough, choking, chest tightness and shortness of breath.    Cardiovascular: Negative for chest pain, palpitations  "and leg swelling.   Gastrointestinal: Negative for abdominal distention, abdominal pain, blood in stool, constipation, diarrhea, nausea and vomiting.   Endocrine: Negative for cold intolerance, heat intolerance, polydipsia and polyuria.   Genitourinary: Negative.  Negative for difficulty urinating and frequency.   Musculoskeletal: Positive for arthralgias. Negative for back pain, gait problem, joint swelling, myalgias, neck pain and neck stiffness.   Skin: Negative for color change, pallor, rash and wound.   Neurological: Negative for dizziness, tremors, weakness, light-headedness, numbness and headaches.   Hematological: Negative for adenopathy.   Psychiatric/Behavioral: Negative for behavioral problems, confusion, self-injury, sleep disturbance and suicidal ideas. The patient is not nervous/anxious.      Objective:   /74 (BP Location: Right arm, Patient Position: Standing, BP Method: Large (Manual))   Pulse 71   Temp 97 °F (36.1 °C) (Tympanic)   Ht 5' 11" (1.803 m)   Wt 84.2 kg (185 lb 10 oz)   SpO2 96%   BMI 25.89 kg/m²     Physical Exam  Vitals signs reviewed.   Constitutional:       Appearance: He is well-developed.   HENT:      Head: Normocephalic and atraumatic.      Right Ear: External ear normal.      Left Ear: External ear normal.      Nose: Nose normal.   Eyes:      Conjunctiva/sclera: Conjunctivae normal.      Pupils: Pupils are equal, round, and reactive to light.   Neck:      Musculoskeletal: Normal range of motion and neck supple.   Cardiovascular:      Rate and Rhythm: Normal rate and regular rhythm.      Heart sounds: Normal heart sounds. No murmur. No friction rub. No gallop.    Pulmonary:      Effort: Pulmonary effort is normal. No respiratory distress.      Breath sounds: Normal breath sounds. No wheezing or rales.   Chest:      Chest wall: No tenderness.   Abdominal:      General: There is no distension.      Palpations: Abdomen is soft.      Tenderness: There is no abdominal " tenderness.   Musculoskeletal: Normal range of motion.      Right upper leg: Normal. He exhibits no tenderness, no bony tenderness and no swelling.      Left upper leg: Normal. He exhibits no tenderness, no bony tenderness and no swelling.      Right lower leg: He exhibits no tenderness, no bony tenderness, no swelling, no deformity and no laceration. No edema.      Left lower leg: Normal. He exhibits no tenderness, no bony tenderness, no swelling, no deformity and no laceration. No edema.      Right foot: Normal. Normal range of motion. No tenderness.      Left foot: Normal. Normal range of motion. No tenderness.   Lymphadenopathy:      Cervical: No cervical adenopathy.   Skin:     General: Skin is warm and dry.   Neurological:      Mental Status: He is alert and oriented to person, place, and time.   Psychiatric:         Behavior: Behavior normal.         Thought Content: Thought content normal.         Judgment: Judgment normal.       Lab Visit on 10/22/2020   Component Date Value Ref Range Status    Sodium 10/22/2020 140  136 - 145 mmol/L Final    Potassium 10/22/2020 4.2  3.5 - 5.1 mmol/L Final    Chloride 10/22/2020 103  95 - 110 mmol/L Final    CO2 10/22/2020 27  23 - 29 mmol/L Final    Glucose 10/22/2020 92  70 - 110 mg/dL Final    BUN 10/22/2020 17  6 - 20 mg/dL Final    Creatinine 10/22/2020 1.5* 0.5 - 1.4 mg/dL Final    Calcium 10/22/2020 9.5  8.7 - 10.5 mg/dL Final    Total Protein 10/22/2020 7.4  6.0 - 8.4 g/dL Final    Albumin 10/22/2020 4.5  3.5 - 5.2 g/dL Final    Total Bilirubin 10/22/2020 0.4  0.1 - 1.0 mg/dL Final    Comment: For infants and newborns, interpretation of results should be based  on gestational age, weight and in agreement with clinical  observations.  Premature Infant recommended reference ranges:  Up to 24 hours.............<8.0 mg/dL  Up to 48 hours............<12.0 mg/dL  3-5 days..................<15.0 mg/dL  6-29 days.................<15.0 mg/dL      Alkaline  Phosphatase 10/22/2020 40* 55 - 135 U/L Final    AST 10/22/2020 15  10 - 40 U/L Final    ALT 10/22/2020 15  10 - 44 U/L Final    Anion Gap 10/22/2020 10  8 - 16 mmol/L Final    eGFR if African American 10/22/2020 >60  >60 mL/min/1.73 m^2 Final    eGFR if non African American 10/22/2020 52* >60 mL/min/1.73 m^2 Final    Comment: Calculation used to obtain the estimated glomerular filtration  rate (eGFR) is the CKD-EPI equation.       Ferritin 10/22/2020 325* 20.0 - 300.0 ng/mL Final       Assessment:     1. Muscle spasm of calf    2. Essential hypertension      Plan:   Muscle spasm of calf  -     Comprehensive Metabolic Panel; Future; Expected date: 10/22/2020  -     Ferritin; Future; Expected date: 10/22/2020  -if no improvement by tomorrow, contact me  -light stretching  -multivitamin and hydration  Educational handout on over-the-counter medications and at-home conservative care, pertinent to the patients diagnosis today, was handed to the patient and discussed in detail.  -if notice redness, swelling or worsening pain, go to the ER    Essential hypertension  -Stable and controlled. Continue current treatment plan as previously prescribed with your PCP.     Follow up if symptoms worsen or fail to improve.

## 2020-10-23 LAB — FERRITIN SERPL-MCNC: 325 NG/ML (ref 20–300)

## 2020-10-25 ENCOUNTER — PATIENT MESSAGE (OUTPATIENT)
Dept: INTERNAL MEDICINE | Facility: CLINIC | Age: 55
End: 2020-10-25

## 2020-10-27 NOTE — TELEPHONE ENCOUNTER
I presume he means hemoglobin electropheresis (I dont see an a1c ordered)  The early November hemoglobin electroph. Test is not needed. Can cancel. Please move hiv and hep c to later November lab

## 2020-11-11 ENCOUNTER — PATIENT MESSAGE (OUTPATIENT)
Dept: INTERNAL MEDICINE | Facility: CLINIC | Age: 55
End: 2020-11-11

## 2020-11-12 ENCOUNTER — PATIENT MESSAGE (OUTPATIENT)
Dept: INTERNAL MEDICINE | Facility: CLINIC | Age: 55
End: 2020-11-12

## 2020-11-17 ENCOUNTER — LAB VISIT (OUTPATIENT)
Dept: LAB | Facility: HOSPITAL | Age: 55
End: 2020-11-17
Attending: FAMILY MEDICINE
Payer: COMMERCIAL

## 2020-11-17 ENCOUNTER — IMMUNIZATION (OUTPATIENT)
Dept: INTERNAL MEDICINE | Facility: CLINIC | Age: 55
End: 2020-11-17
Payer: COMMERCIAL

## 2020-11-17 DIAGNOSIS — I10 ESSENTIAL HYPERTENSION: ICD-10-CM

## 2020-11-17 DIAGNOSIS — Z11.4 ENCOUNTER FOR SCREENING FOR HUMAN IMMUNODEFICIENCY VIRUS (HIV): ICD-10-CM

## 2020-11-17 DIAGNOSIS — Z11.59 NEED FOR HEPATITIS C SCREENING TEST: ICD-10-CM

## 2020-11-17 DIAGNOSIS — E29.1 HYPOGONADISM IN MALE: ICD-10-CM

## 2020-11-17 DIAGNOSIS — Z12.5 SCREENING FOR PROSTATE CANCER: ICD-10-CM

## 2020-11-17 LAB
ALBUMIN SERPL BCP-MCNC: 4.6 G/DL (ref 3.5–5.2)
ALP SERPL-CCNC: 43 U/L (ref 55–135)
ALT SERPL W/O P-5'-P-CCNC: 16 U/L (ref 10–44)
ANION GAP SERPL CALC-SCNC: 10 MMOL/L (ref 8–16)
AST SERPL-CCNC: 17 U/L (ref 10–40)
BASOPHILS # BLD AUTO: 0.04 K/UL (ref 0–0.2)
BASOPHILS NFR BLD: 1 % (ref 0–1.9)
BILIRUB SERPL-MCNC: 0.7 MG/DL (ref 0.1–1)
BUN SERPL-MCNC: 21 MG/DL (ref 6–20)
CALCIUM SERPL-MCNC: 9.7 MG/DL (ref 8.7–10.5)
CHLORIDE SERPL-SCNC: 99 MMOL/L (ref 95–110)
CHOLEST SERPL-MCNC: 148 MG/DL (ref 120–199)
CHOLEST/HDLC SERPL: 2.7 {RATIO} (ref 2–5)
CO2 SERPL-SCNC: 29 MMOL/L (ref 23–29)
COMPLEXED PSA SERPL-MCNC: 0.57 NG/ML (ref 0–4)
CREAT SERPL-MCNC: 1.5 MG/DL (ref 0.5–1.4)
DIFFERENTIAL METHOD: ABNORMAL
EOSINOPHIL # BLD AUTO: 0 K/UL (ref 0–0.5)
EOSINOPHIL NFR BLD: 0.8 % (ref 0–8)
ERYTHROCYTE [DISTWIDTH] IN BLOOD BY AUTOMATED COUNT: 14 % (ref 11.5–14.5)
EST. GFR  (AFRICAN AMERICAN): >60 ML/MIN/1.73 M^2
EST. GFR  (NON AFRICAN AMERICAN): 52 ML/MIN/1.73 M^2
GLUCOSE SERPL-MCNC: 86 MG/DL (ref 70–110)
HCT VFR BLD AUTO: 40.3 % (ref 40–54)
HDLC SERPL-MCNC: 55 MG/DL (ref 40–75)
HDLC SERPL: 37.2 % (ref 20–50)
HGB BLD-MCNC: 13 G/DL (ref 14–18)
IMM GRANULOCYTES # BLD AUTO: 0 K/UL (ref 0–0.04)
IMM GRANULOCYTES NFR BLD AUTO: 0 % (ref 0–0.5)
LDLC SERPL CALC-MCNC: 84 MG/DL (ref 63–159)
LYMPHOCYTES # BLD AUTO: 1.7 K/UL (ref 1–4.8)
LYMPHOCYTES NFR BLD: 42.8 % (ref 18–48)
MCH RBC QN AUTO: 27.7 PG (ref 27–31)
MCHC RBC AUTO-ENTMCNC: 32.3 G/DL (ref 32–36)
MCV RBC AUTO: 86 FL (ref 82–98)
MONOCYTES # BLD AUTO: 0.4 K/UL (ref 0.3–1)
MONOCYTES NFR BLD: 9 % (ref 4–15)
NEUTROPHILS # BLD AUTO: 1.8 K/UL (ref 1.8–7.7)
NEUTROPHILS NFR BLD: 46.4 % (ref 38–73)
NONHDLC SERPL-MCNC: 93 MG/DL
NRBC BLD-RTO: 0 /100 WBC
PLATELET # BLD AUTO: 303 K/UL (ref 150–350)
PMV BLD AUTO: 10.7 FL (ref 9.2–12.9)
POTASSIUM SERPL-SCNC: 3.7 MMOL/L (ref 3.5–5.1)
PROT SERPL-MCNC: 7.7 G/DL (ref 6–8.4)
RBC # BLD AUTO: 4.69 M/UL (ref 4.6–6.2)
SODIUM SERPL-SCNC: 138 MMOL/L (ref 136–145)
TESTOST SERPL-MCNC: 340 NG/DL (ref 304–1227)
TRIGL SERPL-MCNC: 45 MG/DL (ref 30–150)
WBC # BLD AUTO: 3.9 K/UL (ref 3.9–12.7)

## 2020-11-17 PROCEDURE — 84153 ASSAY OF PSA TOTAL: CPT

## 2020-11-17 PROCEDURE — 80061 LIPID PANEL: CPT

## 2020-11-17 PROCEDURE — 90471 FLU VACCINE (QUAD) GREATER THAN OR EQUAL TO 3YO PRESERVATIVE FREE IM: ICD-10-PCS | Mod: S$GLB,,, | Performed by: FAMILY MEDICINE

## 2020-11-17 PROCEDURE — 86803 HEPATITIS C AB TEST: CPT

## 2020-11-17 PROCEDURE — 36415 COLL VENOUS BLD VENIPUNCTURE: CPT

## 2020-11-17 PROCEDURE — 84403 ASSAY OF TOTAL TESTOSTERONE: CPT

## 2020-11-17 PROCEDURE — 90471 IMMUNIZATION ADMIN: CPT | Mod: S$GLB,,, | Performed by: FAMILY MEDICINE

## 2020-11-17 PROCEDURE — 85025 COMPLETE CBC W/AUTO DIFF WBC: CPT

## 2020-11-17 PROCEDURE — 86703 HIV-1/HIV-2 1 RESULT ANTBDY: CPT

## 2020-11-17 PROCEDURE — 90686 FLU VACCINE (QUAD) GREATER THAN OR EQUAL TO 3YO PRESERVATIVE FREE IM: ICD-10-PCS | Mod: S$GLB,,, | Performed by: FAMILY MEDICINE

## 2020-11-17 PROCEDURE — 80053 COMPREHEN METABOLIC PANEL: CPT

## 2020-11-17 PROCEDURE — 90686 IIV4 VACC NO PRSV 0.5 ML IM: CPT | Mod: S$GLB,,, | Performed by: FAMILY MEDICINE

## 2020-11-17 NOTE — PROGRESS NOTES
After reviewing allergies and medications pt received a regular flu vaccine in the left deltoid. Pt tolerated well. Also advised pt to wait in clinic for 15 mins to monitor for s/s of adverse reaction. Pt verbalized understanding.    RONNIE Berg

## 2020-11-18 LAB
HCV AB SERPL QL IA: NEGATIVE
HIV 1+2 AB+HIV1 P24 AG SERPL QL IA: NEGATIVE

## 2020-11-22 ENCOUNTER — PATIENT MESSAGE (OUTPATIENT)
Dept: INTERNAL MEDICINE | Facility: CLINIC | Age: 55
End: 2020-11-22

## 2020-11-23 ENCOUNTER — PATIENT MESSAGE (OUTPATIENT)
Dept: INTERNAL MEDICINE | Facility: CLINIC | Age: 55
End: 2020-11-23

## 2020-12-08 ENCOUNTER — OFFICE VISIT (OUTPATIENT)
Dept: INTERNAL MEDICINE | Facility: CLINIC | Age: 55
End: 2020-12-08
Payer: COMMERCIAL

## 2020-12-08 VITALS
HEART RATE: 72 BPM | SYSTOLIC BLOOD PRESSURE: 116 MMHG | RESPIRATION RATE: 16 BRPM | WEIGHT: 181.88 LBS | HEIGHT: 71 IN | DIASTOLIC BLOOD PRESSURE: 89 MMHG | BODY MASS INDEX: 25.46 KG/M2 | TEMPERATURE: 98 F

## 2020-12-08 DIAGNOSIS — Z12.5 SCREENING FOR PROSTATE CANCER: ICD-10-CM

## 2020-12-08 DIAGNOSIS — R68.82 LIBIDO, DECREASED: ICD-10-CM

## 2020-12-08 DIAGNOSIS — I10 ESSENTIAL HYPERTENSION: ICD-10-CM

## 2020-12-08 DIAGNOSIS — Z00.00 ROUTINE HEALTH MAINTENANCE: Primary | ICD-10-CM

## 2020-12-08 DIAGNOSIS — N28.9 RENAL INSUFFICIENCY: ICD-10-CM

## 2020-12-08 PROCEDURE — 3079F PR MOST RECENT DIASTOLIC BLOOD PRESSURE 80-89 MM HG: ICD-10-PCS | Mod: CPTII,S$GLB,, | Performed by: FAMILY MEDICINE

## 2020-12-08 PROCEDURE — 3008F PR BODY MASS INDEX (BMI) DOCUMENTED: ICD-10-PCS | Mod: CPTII,S$GLB,, | Performed by: FAMILY MEDICINE

## 2020-12-08 PROCEDURE — 3074F PR MOST RECENT SYSTOLIC BLOOD PRESSURE < 130 MM HG: ICD-10-PCS | Mod: CPTII,S$GLB,, | Performed by: FAMILY MEDICINE

## 2020-12-08 PROCEDURE — 99396 PR PREVENTIVE VISIT,EST,40-64: ICD-10-PCS | Mod: S$GLB,,, | Performed by: FAMILY MEDICINE

## 2020-12-08 PROCEDURE — 3079F DIAST BP 80-89 MM HG: CPT | Mod: CPTII,S$GLB,, | Performed by: FAMILY MEDICINE

## 2020-12-08 PROCEDURE — 99396 PREV VISIT EST AGE 40-64: CPT | Mod: S$GLB,,, | Performed by: FAMILY MEDICINE

## 2020-12-08 PROCEDURE — 99999 PR PBB SHADOW E&M-EST. PATIENT-LVL III: CPT | Mod: PBBFAC,,, | Performed by: FAMILY MEDICINE

## 2020-12-08 PROCEDURE — 3074F SYST BP LT 130 MM HG: CPT | Mod: CPTII,S$GLB,, | Performed by: FAMILY MEDICINE

## 2020-12-08 PROCEDURE — 3008F BODY MASS INDEX DOCD: CPT | Mod: CPTII,S$GLB,, | Performed by: FAMILY MEDICINE

## 2020-12-08 PROCEDURE — 99999 PR PBB SHADOW E&M-EST. PATIENT-LVL III: ICD-10-PCS | Mod: PBBFAC,,, | Performed by: FAMILY MEDICINE

## 2020-12-08 NOTE — PROGRESS NOTES
Subjective:       Patient ID: Marco A Perez is a 55 y.o. male.    Chief Complaint: Annual Exam    HPI Hypertension: blood pressures at home normal. Tolerating medicine.   Inc creat and nl gfr /nephrol  Low sex drive few months. tstost low nl; some temporary stressors    Chr left shouldpain w rom qhs shot tempor help    Past Medical History:   Diagnosis Date    HTN (hypertension)     Hyperlipidemia     Migraine      Past Surgical History:   Procedure Laterality Date    COLONOSCOPY N/A 11/10/2017    Procedure: COLONOSCOPY;  Surgeon: Reed Penny MD;  Location: [facility];  Service: Endoscopy;  Laterality: N/A;     Family History   Problem Relation Age of Onset    Heart disease Mother     Heart disease Father     Diabetes Sister      Social History     Socioeconomic History    Marital status:      Spouse name: Not on file    Number of children: Not on file    Years of education: Not on file    Highest education level: Not on file   Occupational History    Not on file   Social Needs    Financial resource strain: Not on file    Food insecurity     Worry: Not on file     Inability: Not on file    Transportation needs     Medical: No     Non-medical: No   Tobacco Use    Smoking status: Never Smoker    Smokeless tobacco: Never Used   Substance and Sexual Activity    Alcohol use: No    Drug use: No    Sexual activity: Not on file   Lifestyle    Physical activity     Days per week: Not on file     Minutes per session: Not on file    Stress: Not on file   Relationships    Social connections     Talks on phone: Three times a week     Gets together: Patient refused     Attends Restorationism service: Not on file     Active member of club or organization: No     Attends meetings of clubs or organizations: Never     Relationship status:    Other Topics Concern    Not on file   Social History Narrative    Not on file       Review of Systems    Cardiovascular: no chest pain  Chest: no shortness  of breath  Abd: no abd pain  Remainder review of systems negative    Objective:      Physical Exam  Vitals signs and nursing note reviewed.   Constitutional:       Appearance: He is well-developed.   HENT:      Head: Normocephalic and atraumatic.      Right Ear: External ear normal.      Left Ear: External ear normal.   Eyes:      General: No scleral icterus.     Conjunctiva/sclera: Conjunctivae normal.      Pupils: Pupils are equal, round, and reactive to light.   Neck:      Musculoskeletal: Normal range of motion and neck supple.      Vascular: No carotid bruit.   Cardiovascular:      Rate and Rhythm: Normal rate and regular rhythm.      Heart sounds: Normal heart sounds. No murmur. No friction rub. No gallop.    Pulmonary:      Effort: Pulmonary effort is normal.      Breath sounds: Normal breath sounds. No wheezing.   Abdominal:      General: Bowel sounds are normal. There is no distension.      Palpations: Abdomen is soft. There is no mass.      Tenderness: There is no abdominal tenderness. There is no guarding or rebound.   Musculoskeletal: Normal range of motion.         General: No tenderness.   Lymphadenopathy:      Cervical: No cervical adenopathy.   Skin:     General: Skin is warm and dry.      Findings: No erythema or rash.   Neurological:      Mental Status: He is alert and oriented to person, place, and time.      Cranial Nerves: No cranial nerve deficit.      Coordination: Coordination normal.   Psychiatric:         Behavior: Behavior normal.         Thought Content: Thought content normal.         Judgment: Judgment normal.       l should rom abdxn bitlimited otw some pain w abdxn  Assessment:     phys exam  1. Essential hypertension    2. Renal insufficiency    3. Screening for prostate cancer    4. Libido, decreased      l should pain  Plan:       *ab 12 months and follow up after  Testosterone another day in am  **    Defers PT; home exec and if no help few weeksthen PT /ortho poss    Routine  health maintenance    Essential hypertension  -     CBC Auto Differential; Future; Expected date: 12/08/2021  -     Comprehensive Metabolic Panel; Future; Expected date: 12/08/2021  -     Lipid Panel; Future; Expected date: 12/08/2021    Renal insufficiency    Screening for prostate cancer  -     PSA, Screening; Future; Expected date: 12/08/2021    Libido, decreased  -     Testosterone; Future; Expected date: 12/08/2020

## 2020-12-08 NOTE — PATIENT INSTRUCTIONS
Shingrix new shingles vaccine  via a pharmacy    Exercises for Shoulder Flexibility: External Rotation    This stretch can help restore shoulder flexibility and relieve pain over time. When stretching, be sure to breathe deeply. Follow any special instructions from your doctor or physical therapist:  1.  a doorway. Grasp the doorjamb with the hand on the frozen side. Your arm should be bent.  2. With the other hand, hold the elbow on the frozen side firmly against your body.  3. Standing in the same spot, rotate your body away from the doorjamb. Stop when you feel the stretch in the shoulder. At first, try to hold the stretch for 5 seconds.  4. Work up to doing 3 sets of this stretch, 3 times a day. Work up to holding the stretch for 30 to 60 seconds.  Note: Keep your arms as still as you can. Over time, rotate your body a little more to enhance the stretch. But be careful not to twist your back.  Frozen shoulder  Frozen shoulder is another name for adhesive capsulitis, which causes restricted movement in the shoulder. If you have frozen shoulder, this stretch may cause discomfort, especially when you first get started. A few months may pass before you achieve the results you want. But once your shoulder heals, it rarely becomes frozen again. So stick to your stretching program. If you have any questions, be sure to ask your doctor.   Date Last Reviewed: 8/16/2015 © 2000-2017 [employer]. [address] [place] [address]. All rights reserved. This information is not intended as a substitute for professional medical care. Always follow your healthcare professional's instructions.        Exercises for Shoulder Flexibility: Internal Rotation    This stretch can help restore shoulder flexibility and relieve pain over time. When stretching, be sure to breathe deeply. Follow any special instructions from your healthcare provider or physical therapist.  5. While seated, move the arm on the  side you want to stretch toward the middle of your back. The palm of your hand should face out.  6. Cup your other hand under the hand thats behind your back. Gently push your cupped hand upward until you feel the stretch in the shoulder. Try to hold the stretch for 5 seconds.  7. Work up to doing 3 sets of this stretch, 3 times a day. Work up to holding the stretch for 30 to 60 seconds.  Note: Keep your back straight. Its OK if your hand cant reach the middle of your back. Instead, start the stretch with your hand as close as you can get it to the middle of your back.     Frozen shoulder  Frozen shoulder is another name for adhesive capsulitis. This causes restricted movement in the shoulder. If you have frozen shoulder, this stretch may cause discomfort, especially when you first get started. A few months may pass before you achieve the results you want. But once your shoulder heals, it rarely becomes frozen again. So stick to your stretching program. If you have any questions, be sure to ask your healthcare provider.   Date Last Reviewed: 10/14/2015  © 7749-2657 [employer]. [address] [place] [address]. All rights reserved. This information is not intended as a substitute for professional medical care. Always follow your healthcare professional's instructions.        Exercises for Shoulder Flexibility: Adduction (Reaching Across)    This stretch can help restore shoulder flexibility and relieve pain over time. When stretching, be sure to breathe deeply. And follow any special instructions from your doctor or physical therapist:  8. Put the hand from the side you want to stretch on your opposite shoulder. Your elbow should point away from your body. Try to raise your elbow as close to shoulder height as you can.  9. With your other hand, push the raised elbow toward the opposite shoulder. Avoid turning your head. Stop when you feel the stretch. Try to hold the stretch  for 5 seconds.  10. Work up to doing 3 sets of this stretch, 3 times a day. Work up to holding the stretch for 30 to 60 seconds.  Note: Be sure to push your elbow across your chest, not up toward your chin. Over time, try to push your elbow farther across your chest to enhance the stretch.  Frozen shoulder  Frozen shoulder is another name for adhesive capsulitis, which causes restricted movement in the shoulder. If you have frozen shoulder, this stretch may cause discomfort, especially when you first get started. A few months may pass before you achieve the results you want. Once your shoulder heals, it rarely becomes frozen again. So stick to your stretching program. If you have any questions, be sure to ask your doctor.   Date Last Reviewed: 8/16/2015 © 2000-2017 [employer]. [address] [place] [address], [place] [address]. All rights reserved. This information is not intended as a substitute for professional medical care. Always follow your healthcare professional's instructions.        Exercises for Shoulder Flexibility: Back Scratch    Improving your flexibility can reduce pain. Stretching exercises also can help increase your range of pain-free motion. Breathe normally when you exercise. Try to use smooth, fluid movements. Never force a stretch.  Note: Follow any special instructions you are given. If you feel pain, stop the exercise. If the pain continues after stopping, call your healthcare provider.  · Stand straight, placing the back of your hand on the side you want to stretch flat against your lower back.  · Throw one end of a towel over your shoulder. Grab it behind your back with your other hand.  · Pull down gently on the towel with your front arm. Let your back arm slide up as high as is comfortable. Youll feel a stretch in your shoulder. Hold the stretch for a few seconds.  · Repeat 3 to 5 times. Build up to holding each stretch for 30 to 60 seconds.  For your safety, check with your  healthcare provider before starting an exercise program.   Date Last Reviewed: 8/26/2015  © 4612-5675 [employer]. [address] [place] [address]. All rights reserved. This information is not intended as a substitute for professional medical care. Always follow your healthcare professional's instructions.        Exercises for Shoulder Flexibility: Wall Walk    Improving your flexibility can reduce pain. Stretching exercises also can help increase your range of pain-free motion. Breathe normally when you exercise. Use smooth, fluid movements.  Note: Follow any special instructions you are given. If you feel pain, stop the exercise. If the pain continues after stopping, call your healthcare provider:  · Stand with your shoulder about 2 feet from the wall.  · Raise your arm to shoulder level and gently walk your fingers up the wall as high as you can.  · Hold for a few seconds. Then walk your fingers back down.  · Repeat 3 times. Move closer to the wall as you repeat.  · Build up to holding each stretch for 30 seconds.  Caution: Do this stretch only if your healthcare provider recommends it. Dont do it when you are first injured.       Date Last Reviewed: 8/16/2015  © 5397-9020 [employer]. [address] [place] [address]. All rights reserved. This information is not intended as a substitute for professional medical care. Always follow your healthcare professional's instructions.

## 2020-12-12 ENCOUNTER — LAB VISIT (OUTPATIENT)
Dept: LAB | Facility: HOSPITAL | Age: 55
End: 2020-12-12
Attending: FAMILY MEDICINE
Payer: COMMERCIAL

## 2020-12-12 DIAGNOSIS — R68.82 LIBIDO, DECREASED: ICD-10-CM

## 2020-12-12 LAB — TESTOST SERPL-MCNC: 364 NG/DL (ref 304–1227)

## 2020-12-12 PROCEDURE — 84403 ASSAY OF TOTAL TESTOSTERONE: CPT

## 2020-12-12 PROCEDURE — 36415 COLL VENOUS BLD VENIPUNCTURE: CPT

## 2021-02-01 ENCOUNTER — PATIENT MESSAGE (OUTPATIENT)
Dept: CARDIOLOGY | Facility: CLINIC | Age: 56
End: 2021-02-01

## 2021-02-01 DIAGNOSIS — E78.49 OTHER HYPERLIPIDEMIA: ICD-10-CM

## 2021-02-01 DIAGNOSIS — G47.33 OSA (OBSTRUCTIVE SLEEP APNEA): ICD-10-CM

## 2021-02-01 DIAGNOSIS — I10 ESSENTIAL HYPERTENSION: Primary | ICD-10-CM

## 2021-02-02 ENCOUNTER — OFFICE VISIT (OUTPATIENT)
Dept: CARDIOLOGY | Facility: CLINIC | Age: 56
End: 2021-02-02
Payer: COMMERCIAL

## 2021-02-02 ENCOUNTER — HOSPITAL ENCOUNTER (OUTPATIENT)
Dept: CARDIOLOGY | Facility: HOSPITAL | Age: 56
Discharge: HOME | End: 2021-02-02
Attending: INTERNAL MEDICINE
Payer: COMMERCIAL

## 2021-02-02 VITALS
BODY MASS INDEX: 25.9 KG/M2 | WEIGHT: 185 LBS | HEIGHT: 71 IN | DIASTOLIC BLOOD PRESSURE: 80 MMHG | RESPIRATION RATE: 16 BRPM | SYSTOLIC BLOOD PRESSURE: 112 MMHG | HEART RATE: 75 BPM | OXYGEN SATURATION: 98 %

## 2021-02-02 DIAGNOSIS — E78.49 OTHER HYPERLIPIDEMIA: Chronic | ICD-10-CM

## 2021-02-02 DIAGNOSIS — E78.49 OTHER HYPERLIPIDEMIA: ICD-10-CM

## 2021-02-02 DIAGNOSIS — G47.33 OSA (OBSTRUCTIVE SLEEP APNEA): ICD-10-CM

## 2021-02-02 DIAGNOSIS — I10 ESSENTIAL HYPERTENSION: Primary | ICD-10-CM

## 2021-02-02 DIAGNOSIS — I10 ESSENTIAL HYPERTENSION: ICD-10-CM

## 2021-02-02 DIAGNOSIS — G43.809 OTHER MIGRAINE WITHOUT STATUS MIGRAINOSUS, NOT INTRACTABLE: ICD-10-CM

## 2021-02-02 PROCEDURE — 99214 OFFICE O/P EST MOD 30 MIN: CPT | Mod: S$GLB,,, | Performed by: INTERNAL MEDICINE

## 2021-02-02 PROCEDURE — 99214 PR OFFICE/OUTPT VISIT, EST, LEVL IV, 30-39 MIN: ICD-10-PCS | Mod: S$GLB,,, | Performed by: INTERNAL MEDICINE

## 2021-02-02 PROCEDURE — 3079F PR MOST RECENT DIASTOLIC BLOOD PRESSURE 80-89 MM HG: ICD-10-PCS | Mod: CPTII,S$GLB,, | Performed by: INTERNAL MEDICINE

## 2021-02-02 PROCEDURE — 3074F PR MOST RECENT SYSTOLIC BLOOD PRESSURE < 130 MM HG: ICD-10-PCS | Mod: CPTII,S$GLB,, | Performed by: INTERNAL MEDICINE

## 2021-02-02 PROCEDURE — 93005 ELECTROCARDIOGRAM TRACING: CPT

## 2021-02-02 PROCEDURE — 3074F SYST BP LT 130 MM HG: CPT | Mod: CPTII,S$GLB,, | Performed by: INTERNAL MEDICINE

## 2021-02-02 PROCEDURE — 3008F PR BODY MASS INDEX (BMI) DOCUMENTED: ICD-10-PCS | Mod: CPTII,S$GLB,, | Performed by: INTERNAL MEDICINE

## 2021-02-02 PROCEDURE — 93010 ELECTROCARDIOGRAM REPORT: CPT | Mod: ,,, | Performed by: INTERNAL MEDICINE

## 2021-02-02 PROCEDURE — 99999 PR PBB SHADOW E&M-EST. PATIENT-LVL III: ICD-10-PCS | Mod: PBBFAC,,, | Performed by: INTERNAL MEDICINE

## 2021-02-02 PROCEDURE — 99999 PR PBB SHADOW E&M-EST. PATIENT-LVL III: CPT | Mod: PBBFAC,,, | Performed by: INTERNAL MEDICINE

## 2021-02-02 PROCEDURE — 3079F DIAST BP 80-89 MM HG: CPT | Mod: CPTII,S$GLB,, | Performed by: INTERNAL MEDICINE

## 2021-02-02 PROCEDURE — 93010 EKG 12-LEAD: ICD-10-PCS | Mod: ,,, | Performed by: INTERNAL MEDICINE

## 2021-02-02 PROCEDURE — 3008F BODY MASS INDEX DOCD: CPT | Mod: CPTII,S$GLB,, | Performed by: INTERNAL MEDICINE

## 2021-02-02 RX ORDER — HYDROCHLOROTHIAZIDE 25 MG/1
25 TABLET ORAL DAILY
Qty: 90 TABLET | Refills: 1 | Status: SHIPPED | OUTPATIENT
Start: 2021-02-02 | End: 2021-06-30 | Stop reason: SDUPTHER

## 2021-06-30 ENCOUNTER — OFFICE VISIT (OUTPATIENT)
Dept: CARDIOLOGY | Facility: CLINIC | Age: 56
End: 2021-06-30
Payer: COMMERCIAL

## 2021-06-30 VITALS
DIASTOLIC BLOOD PRESSURE: 82 MMHG | SYSTOLIC BLOOD PRESSURE: 120 MMHG | BODY MASS INDEX: 26.05 KG/M2 | WEIGHT: 186.06 LBS | HEIGHT: 71 IN | OXYGEN SATURATION: 97 % | HEART RATE: 74 BPM

## 2021-06-30 DIAGNOSIS — G43.809 OTHER MIGRAINE WITHOUT STATUS MIGRAINOSUS, NOT INTRACTABLE: ICD-10-CM

## 2021-06-30 DIAGNOSIS — E78.49 OTHER HYPERLIPIDEMIA: Chronic | ICD-10-CM

## 2021-06-30 DIAGNOSIS — I10 ESSENTIAL HYPERTENSION: Primary | ICD-10-CM

## 2021-06-30 PROCEDURE — 3079F PR MOST RECENT DIASTOLIC BLOOD PRESSURE 80-89 MM HG: ICD-10-PCS | Mod: CPTII,S$GLB,, | Performed by: INTERNAL MEDICINE

## 2021-06-30 PROCEDURE — 3079F DIAST BP 80-89 MM HG: CPT | Mod: CPTII,S$GLB,, | Performed by: INTERNAL MEDICINE

## 2021-06-30 PROCEDURE — 99999 PR PBB SHADOW E&M-EST. PATIENT-LVL III: CPT | Mod: PBBFAC,,, | Performed by: INTERNAL MEDICINE

## 2021-06-30 PROCEDURE — 3008F BODY MASS INDEX DOCD: CPT | Mod: CPTII,S$GLB,, | Performed by: INTERNAL MEDICINE

## 2021-06-30 PROCEDURE — 99214 PR OFFICE/OUTPT VISIT, EST, LEVL IV, 30-39 MIN: ICD-10-PCS | Mod: S$GLB,,, | Performed by: INTERNAL MEDICINE

## 2021-06-30 PROCEDURE — 3074F PR MOST RECENT SYSTOLIC BLOOD PRESSURE < 130 MM HG: ICD-10-PCS | Mod: CPTII,S$GLB,, | Performed by: INTERNAL MEDICINE

## 2021-06-30 PROCEDURE — 3008F PR BODY MASS INDEX (BMI) DOCUMENTED: ICD-10-PCS | Mod: CPTII,S$GLB,, | Performed by: INTERNAL MEDICINE

## 2021-06-30 PROCEDURE — 3074F SYST BP LT 130 MM HG: CPT | Mod: CPTII,S$GLB,, | Performed by: INTERNAL MEDICINE

## 2021-06-30 PROCEDURE — 99214 OFFICE O/P EST MOD 30 MIN: CPT | Mod: S$GLB,,, | Performed by: INTERNAL MEDICINE

## 2021-06-30 PROCEDURE — 99999 PR PBB SHADOW E&M-EST. PATIENT-LVL III: ICD-10-PCS | Mod: PBBFAC,,, | Performed by: INTERNAL MEDICINE

## 2021-06-30 RX ORDER — PRAVASTATIN SODIUM 40 MG/1
40 TABLET ORAL DAILY
Qty: 90 TABLET | Refills: 3 | Status: SHIPPED | OUTPATIENT
Start: 2021-06-30 | End: 2021-11-07

## 2021-06-30 RX ORDER — HYDROCHLOROTHIAZIDE 25 MG/1
25 TABLET ORAL DAILY
Qty: 90 TABLET | Refills: 3 | Status: SHIPPED | OUTPATIENT
Start: 2021-06-30 | End: 2022-07-27 | Stop reason: SDUPTHER

## 2021-12-04 ENCOUNTER — LAB VISIT (OUTPATIENT)
Dept: LAB | Facility: HOSPITAL | Age: 56
End: 2021-12-04
Attending: FAMILY MEDICINE
Payer: COMMERCIAL

## 2021-12-04 DIAGNOSIS — Z12.5 SCREENING FOR PROSTATE CANCER: ICD-10-CM

## 2021-12-04 DIAGNOSIS — I10 ESSENTIAL HYPERTENSION: ICD-10-CM

## 2021-12-04 LAB
ALBUMIN SERPL BCP-MCNC: 4.1 G/DL (ref 3.5–5.2)
ALP SERPL-CCNC: 42 U/L (ref 55–135)
ALT SERPL W/O P-5'-P-CCNC: 16 U/L (ref 10–44)
ANION GAP SERPL CALC-SCNC: 12 MMOL/L (ref 8–16)
AST SERPL-CCNC: 14 U/L (ref 10–40)
BASOPHILS # BLD AUTO: 0.04 K/UL (ref 0–0.2)
BASOPHILS NFR BLD: 1.1 % (ref 0–1.9)
BILIRUB SERPL-MCNC: 0.4 MG/DL (ref 0.1–1)
BUN SERPL-MCNC: 15 MG/DL (ref 6–20)
CALCIUM SERPL-MCNC: 9.5 MG/DL (ref 8.7–10.5)
CHLORIDE SERPL-SCNC: 101 MMOL/L (ref 95–110)
CHOLEST SERPL-MCNC: 148 MG/DL (ref 120–199)
CHOLEST/HDLC SERPL: 3 {RATIO} (ref 2–5)
CO2 SERPL-SCNC: 26 MMOL/L (ref 23–29)
COMPLEXED PSA SERPL-MCNC: 0.67 NG/ML (ref 0–4)
CREAT SERPL-MCNC: 1.3 MG/DL (ref 0.5–1.4)
DIFFERENTIAL METHOD: ABNORMAL
EOSINOPHIL # BLD AUTO: 0.1 K/UL (ref 0–0.5)
EOSINOPHIL NFR BLD: 1.4 % (ref 0–8)
ERYTHROCYTE [DISTWIDTH] IN BLOOD BY AUTOMATED COUNT: 14.1 % (ref 11.5–14.5)
EST. GFR  (AFRICAN AMERICAN): >60 ML/MIN/1.73 M^2
EST. GFR  (NON AFRICAN AMERICAN): >60 ML/MIN/1.73 M^2
GLUCOSE SERPL-MCNC: 89 MG/DL (ref 70–110)
HCT VFR BLD AUTO: 41.5 % (ref 40–54)
HDLC SERPL-MCNC: 50 MG/DL (ref 40–75)
HDLC SERPL: 33.8 % (ref 20–50)
HGB BLD-MCNC: 13.1 G/DL (ref 14–18)
IMM GRANULOCYTES # BLD AUTO: 0 K/UL (ref 0–0.04)
IMM GRANULOCYTES NFR BLD AUTO: 0 % (ref 0–0.5)
LDLC SERPL CALC-MCNC: 82.8 MG/DL (ref 63–159)
LYMPHOCYTES # BLD AUTO: 1.6 K/UL (ref 1–4.8)
LYMPHOCYTES NFR BLD: 43.2 % (ref 18–48)
MCH RBC QN AUTO: 26.9 PG (ref 27–31)
MCHC RBC AUTO-ENTMCNC: 31.6 G/DL (ref 32–36)
MCV RBC AUTO: 85 FL (ref 82–98)
MONOCYTES # BLD AUTO: 0.4 K/UL (ref 0.3–1)
MONOCYTES NFR BLD: 9.5 % (ref 4–15)
NEUTROPHILS # BLD AUTO: 1.7 K/UL (ref 1.8–7.7)
NEUTROPHILS NFR BLD: 44.8 % (ref 38–73)
NONHDLC SERPL-MCNC: 98 MG/DL
NRBC BLD-RTO: 0 /100 WBC
PLATELET # BLD AUTO: 312 K/UL (ref 150–450)
PMV BLD AUTO: 10.2 FL (ref 9.2–12.9)
POTASSIUM SERPL-SCNC: 3.7 MMOL/L (ref 3.5–5.1)
PROT SERPL-MCNC: 7.1 G/DL (ref 6–8.4)
RBC # BLD AUTO: 4.87 M/UL (ref 4.6–6.2)
SODIUM SERPL-SCNC: 139 MMOL/L (ref 136–145)
TRIGL SERPL-MCNC: 76 MG/DL (ref 30–150)
WBC # BLD AUTO: 3.7 K/UL (ref 3.9–12.7)

## 2021-12-04 PROCEDURE — 85025 COMPLETE CBC W/AUTO DIFF WBC: CPT | Performed by: FAMILY MEDICINE

## 2021-12-04 PROCEDURE — 80061 LIPID PANEL: CPT | Performed by: FAMILY MEDICINE

## 2021-12-04 PROCEDURE — 80053 COMPREHEN METABOLIC PANEL: CPT | Performed by: FAMILY MEDICINE

## 2021-12-04 PROCEDURE — 84153 ASSAY OF PSA TOTAL: CPT | Performed by: FAMILY MEDICINE

## 2021-12-04 PROCEDURE — 36415 COLL VENOUS BLD VENIPUNCTURE: CPT | Performed by: FAMILY MEDICINE

## 2021-12-09 ENCOUNTER — OFFICE VISIT (OUTPATIENT)
Dept: INTERNAL MEDICINE | Facility: CLINIC | Age: 56
End: 2021-12-09
Payer: COMMERCIAL

## 2021-12-09 ENCOUNTER — TELEPHONE (OUTPATIENT)
Dept: PULMONOLOGY | Facility: CLINIC | Age: 56
End: 2021-12-09
Payer: COMMERCIAL

## 2021-12-09 VITALS
WEIGHT: 183.88 LBS | HEART RATE: 83 BPM | HEIGHT: 71 IN | OXYGEN SATURATION: 97 % | BODY MASS INDEX: 25.74 KG/M2 | DIASTOLIC BLOOD PRESSURE: 78 MMHG | SYSTOLIC BLOOD PRESSURE: 116 MMHG | TEMPERATURE: 99 F

## 2021-12-09 DIAGNOSIS — I10 ESSENTIAL HYPERTENSION: ICD-10-CM

## 2021-12-09 DIAGNOSIS — Z12.5 SCREENING FOR PROSTATE CANCER: ICD-10-CM

## 2021-12-09 DIAGNOSIS — Z00.00 ROUTINE HEALTH MAINTENANCE: Primary | ICD-10-CM

## 2021-12-09 DIAGNOSIS — G47.33 OSA (OBSTRUCTIVE SLEEP APNEA): ICD-10-CM

## 2021-12-09 PROCEDURE — 99396 PREV VISIT EST AGE 40-64: CPT | Mod: S$GLB,,, | Performed by: FAMILY MEDICINE

## 2021-12-09 PROCEDURE — 99396 PR PREVENTIVE VISIT,EST,40-64: ICD-10-PCS | Mod: S$GLB,,, | Performed by: FAMILY MEDICINE

## 2021-12-09 PROCEDURE — 99999 PR PBB SHADOW E&M-EST. PATIENT-LVL III: CPT | Mod: PBBFAC,,, | Performed by: FAMILY MEDICINE

## 2021-12-09 PROCEDURE — 99999 PR PBB SHADOW E&M-EST. PATIENT-LVL III: ICD-10-PCS | Mod: PBBFAC,,, | Performed by: FAMILY MEDICINE

## 2021-12-09 RX ORDER — HYDROCODONE BITARTRATE AND ACETAMINOPHEN 5; 325 MG/1; MG/1
1 TABLET ORAL 4 TIMES DAILY PRN
COMMUNITY
Start: 2021-08-10 | End: 2022-12-12

## 2021-12-12 ENCOUNTER — PATIENT MESSAGE (OUTPATIENT)
Dept: INTERNAL MEDICINE | Facility: CLINIC | Age: 56
End: 2021-12-12
Payer: COMMERCIAL

## 2021-12-17 NOTE — TELEPHONE ENCOUNTER
----- Message from Tessy Hodgson sent at 7/22/2020  8:27 AM CDT -----  Regarding: sooner appt  .Type:  Sooner Apoointment Request    Caller is requesting a sooner appointment.  Caller declined first available appointment listed below.  Caller will not accept being placed on the waitlist and is requesting a message be sent to doctor.  Name of Caller: pt  When is the first available appointment? 10/6  Symptoms: follow up/ discuss test results   Would the patient rather a call back or a response via [facility]?  Call back   Best Call Back Number: [phone]   Additional Information:          
S/W pt, appt scheduled to see Dr. Linton 08/06/20, pt verbalized understanding/jj  
sister and her spouse

## 2022-05-13 ENCOUNTER — HOSPITAL ENCOUNTER (OUTPATIENT)
Dept: RADIOLOGY | Facility: HOSPITAL | Age: 57
Discharge: HOME | End: 2022-05-13
Attending: NURSE PRACTITIONER
Payer: COMMERCIAL

## 2022-05-13 ENCOUNTER — TELEPHONE (OUTPATIENT)
Dept: INTERNAL MEDICINE | Facility: CLINIC | Age: 57
End: 2022-05-13
Payer: COMMERCIAL

## 2022-05-13 ENCOUNTER — TELEPHONE (OUTPATIENT)
Dept: PHYSICAL MEDICINE AND REHAB | Facility: CLINIC | Age: 57
End: 2022-05-13
Payer: COMMERCIAL

## 2022-05-13 ENCOUNTER — OFFICE VISIT (OUTPATIENT)
Dept: INTERNAL MEDICINE | Facility: CLINIC | Age: 57
End: 2022-05-13
Payer: COMMERCIAL

## 2022-05-13 VITALS
HEART RATE: 70 BPM | WEIGHT: 185.44 LBS | OXYGEN SATURATION: 96 % | HEIGHT: 71 IN | RESPIRATION RATE: 16 BRPM | SYSTOLIC BLOOD PRESSURE: 124 MMHG | DIASTOLIC BLOOD PRESSURE: 82 MMHG | BODY MASS INDEX: 25.96 KG/M2

## 2022-05-13 DIAGNOSIS — R20.2 PARESTHESIAS IN RIGHT HAND: Primary | ICD-10-CM

## 2022-05-13 DIAGNOSIS — R22.2 MASS OF RIGHT CHEST WALL: ICD-10-CM

## 2022-05-13 PROCEDURE — 71046 X-RAY EXAM CHEST 2 VIEWS: CPT | Mod: 26,,, | Performed by: RADIOLOGY

## 2022-05-13 PROCEDURE — 99999 PR PBB SHADOW E&M-EST. PATIENT-LVL IV: CPT | Mod: PBBFAC,,, | Performed by: NURSE PRACTITIONER

## 2022-05-13 PROCEDURE — 3008F BODY MASS INDEX DOCD: CPT | Mod: CPTII,S$GLB,, | Performed by: NURSE PRACTITIONER

## 2022-05-13 PROCEDURE — 99999 PR PBB SHADOW E&M-EST. PATIENT-LVL IV: ICD-10-PCS | Mod: PBBFAC,,, | Performed by: NURSE PRACTITIONER

## 2022-05-13 PROCEDURE — 71046 XR CHEST PA AND LATERAL: ICD-10-PCS | Mod: 26,,, | Performed by: RADIOLOGY

## 2022-05-13 PROCEDURE — 3074F SYST BP LT 130 MM HG: CPT | Mod: CPTII,S$GLB,, | Performed by: NURSE PRACTITIONER

## 2022-05-13 PROCEDURE — 1159F MED LIST DOCD IN RCRD: CPT | Mod: CPTII,S$GLB,, | Performed by: NURSE PRACTITIONER

## 2022-05-13 PROCEDURE — 99213 PR OFFICE/OUTPT VISIT, EST, LEVL III, 20-29 MIN: ICD-10-PCS | Mod: S$GLB,,, | Performed by: NURSE PRACTITIONER

## 2022-05-13 PROCEDURE — 99213 OFFICE O/P EST LOW 20 MIN: CPT | Mod: S$GLB,,, | Performed by: NURSE PRACTITIONER

## 2022-05-13 PROCEDURE — 3074F PR MOST RECENT SYSTOLIC BLOOD PRESSURE < 130 MM HG: ICD-10-PCS | Mod: CPTII,S$GLB,, | Performed by: NURSE PRACTITIONER

## 2022-05-13 PROCEDURE — 1159F PR MEDICATION LIST DOCUMENTED IN MEDICAL RECORD: ICD-10-PCS | Mod: CPTII,S$GLB,, | Performed by: NURSE PRACTITIONER

## 2022-05-13 PROCEDURE — 3008F PR BODY MASS INDEX (BMI) DOCUMENTED: ICD-10-PCS | Mod: CPTII,S$GLB,, | Performed by: NURSE PRACTITIONER

## 2022-05-13 PROCEDURE — 71046 X-RAY EXAM CHEST 2 VIEWS: CPT | Mod: TC

## 2022-05-13 PROCEDURE — 3079F PR MOST RECENT DIASTOLIC BLOOD PRESSURE 80-89 MM HG: ICD-10-PCS | Mod: CPTII,S$GLB,, | Performed by: NURSE PRACTITIONER

## 2022-05-13 PROCEDURE — 3079F DIAST BP 80-89 MM HG: CPT | Mod: CPTII,S$GLB,, | Performed by: NURSE PRACTITIONER

## 2022-05-13 NOTE — PROGRESS NOTES
Subjective:       Patient ID: Marco A Perez is a 57 y.o. male.    Chief Complaint: knot on rt side    HPI    Reports nodule to RCW mildly tender. Noticed 2 weeks ago. No trauma. No other acute issues    Also reports ongoing numbness to 4th,5th digit R hand since 2018. Appears worse now        Past Medical History:   Diagnosis Date    HTN (hypertension)     Hyperlipidemia     Migraine     JUAN (obstructive sleep apnea) 2017    mild t mod       Past Surgical History:   Procedure Laterality Date    COLONOSCOPY N/A 11/10/2017    Procedure: COLONOSCOPY;  Surgeon: Reed Penny MD;  Location: [facility];  Service: Endoscopy;  Laterality: N/A;     Social History     Socioeconomic History    Marital status:    Tobacco Use    Smoking status: Never Smoker    Smokeless tobacco: Never Used   Substance and Sexual Activity    Alcohol use: No    Drug use: No     Review of patient's allergies indicates:   Allergen Reactions    Nsaids (non-steroidal anti-inflammatory drug)      Renal insuff     Current Outpatient Medications   Medication Sig    amLODIPine (NORVASC) 5 MG tablet Take 1 tablet by mouth once daily    aspirin (ECOTRIN) 81 MG EC tablet Take 81 mg by mouth once daily.    butalbital-acetaminophen-caffeine -40 mg (FIORICET, ESGIC) -40 mg per tablet TAKE ONE TO TWO TABLETS BY MOUTH EVERY 4 HOURS AS NEEDED    hydroCHLOROthiazide (HYDRODIURIL) 25 MG tablet Take 1 tablet (25 mg total) by mouth once daily.    HYDROcodone-acetaminophen (NORCO) 5-325 mg per tablet Take 1 tablet by mouth 4 (four) times daily as needed.    pravastatin (PRAVACHOL) 40 MG tablet Take 1 tablet by mouth once daily     No current facility-administered medications for this visit.           Review of Systems   Constitutional: Negative for activity change, appetite change, chills, diaphoresis, fatigue, fever and unexpected weight change.   HENT: Negative for congestion, ear pain, postnasal drip, rhinorrhea, sinus  pressure, sinus pain, sneezing, sore throat, tinnitus, trouble swallowing and voice change.    Eyes: Negative for photophobia, pain and visual disturbance.   Respiratory: Negative for cough, chest tightness, shortness of breath and wheezing.         See HPI   Cardiovascular: Negative for chest pain, palpitations and leg swelling.   Gastrointestinal: Negative for abdominal distention, abdominal pain, constipation, diarrhea, nausea and vomiting.   Genitourinary: Negative for decreased urine volume, difficulty urinating, dysuria, flank pain, frequency, hematuria and urgency.   Musculoskeletal: Negative for arthralgias, back pain, joint swelling, neck pain and neck stiffness.   Allergic/Immunologic: Negative for immunocompromised state.   Neurological: Negative for dizziness, tremors, seizures, syncope, facial asymmetry, speech difficulty, weakness, light-headedness, numbness and headaches.        See HPI   Hematological: Negative for adenopathy. Does not bruise/bleed easily.   Psychiatric/Behavioral: Negative for confusion and sleep disturbance.       Objective:      Physical Exam  Vitals reviewed.   Cardiovascular:      Rate and Rhythm: Normal rate and regular rhythm.      Heart sounds: Normal heart sounds.   Pulmonary:      Effort: Pulmonary effort is normal.      Breath sounds: Normal breath sounds.   Abdominal:          Comments: TTP soft tissue swelling. No erythema or rash   Neurological:      Mental Status: He is alert.         Assessment:     Vitals:    05/13/22 1015   BP: 124/82   Pulse: 70   Resp: 16         1. Paresthesias in right hand    2. Mass of right chest wall        Plan:   Paresthesias in right hand  -     EMG W/ ULTRASOUND AND NERVE CONDUCTION TEST 1 Extremity; Future    Mass of right chest wall  -     X-Ray Chest PA And Lateral; Future; Expected date: 05/13/2022  -      Soft Tissue Chest_Upper Back; Future; Expected date: 05/13/2022

## 2022-05-16 ENCOUNTER — HOSPITAL ENCOUNTER (OUTPATIENT)
Dept: RADIOLOGY | Facility: HOSPITAL | Age: 57
Discharge: HOME | End: 2022-05-16
Attending: NURSE PRACTITIONER
Payer: COMMERCIAL

## 2022-05-16 DIAGNOSIS — R22.2 MASS OF RIGHT CHEST WALL: ICD-10-CM

## 2022-05-16 PROCEDURE — 76604 US SOFT TISSUE CHEST_UPPER BACK: ICD-10-PCS | Mod: 26,,, | Performed by: RADIOLOGY

## 2022-05-16 PROCEDURE — 76604 US EXAM CHEST: CPT | Mod: 26,,, | Performed by: RADIOLOGY

## 2022-05-16 PROCEDURE — 76604 US EXAM CHEST: CPT | Mod: TC

## 2022-09-23 DIAGNOSIS — I10 ESSENTIAL HYPERTENSION: Primary | ICD-10-CM

## 2022-09-29 ENCOUNTER — HOSPITAL ENCOUNTER (OUTPATIENT)
Dept: CARDIOLOGY | Facility: HOSPITAL | Age: 57
Discharge: HOME | End: 2022-09-29
Attending: INTERNAL MEDICINE
Payer: COMMERCIAL

## 2022-09-29 ENCOUNTER — OFFICE VISIT (OUTPATIENT)
Dept: CARDIOLOGY | Facility: CLINIC | Age: 57
End: 2022-09-29
Payer: COMMERCIAL

## 2022-09-29 VITALS
OXYGEN SATURATION: 98 % | SYSTOLIC BLOOD PRESSURE: 122 MMHG | HEIGHT: 71 IN | HEART RATE: 72 BPM | BODY MASS INDEX: 26.11 KG/M2 | DIASTOLIC BLOOD PRESSURE: 82 MMHG | WEIGHT: 186.5 LBS

## 2022-09-29 DIAGNOSIS — G43.809 OTHER MIGRAINE WITHOUT STATUS MIGRAINOSUS, NOT INTRACTABLE: ICD-10-CM

## 2022-09-29 DIAGNOSIS — E78.49 OTHER HYPERLIPIDEMIA: ICD-10-CM

## 2022-09-29 DIAGNOSIS — I10 ESSENTIAL HYPERTENSION: Primary | ICD-10-CM

## 2022-09-29 DIAGNOSIS — I10 ESSENTIAL HYPERTENSION: ICD-10-CM

## 2022-09-29 PROCEDURE — 99214 OFFICE O/P EST MOD 30 MIN: CPT | Mod: S$GLB,,, | Performed by: INTERNAL MEDICINE

## 2022-09-29 PROCEDURE — 3074F SYST BP LT 130 MM HG: CPT | Mod: CPTII,S$GLB,, | Performed by: INTERNAL MEDICINE

## 2022-09-29 PROCEDURE — 3008F BODY MASS INDEX DOCD: CPT | Mod: CPTII,S$GLB,, | Performed by: INTERNAL MEDICINE

## 2022-09-29 PROCEDURE — 1160F RVW MEDS BY RX/DR IN RCRD: CPT | Mod: CPTII,S$GLB,, | Performed by: INTERNAL MEDICINE

## 2022-09-29 PROCEDURE — 1159F MED LIST DOCD IN RCRD: CPT | Mod: CPTII,S$GLB,, | Performed by: INTERNAL MEDICINE

## 2022-09-29 PROCEDURE — 93010 ELECTROCARDIOGRAM REPORT: CPT | Mod: ,,, | Performed by: INTERNAL MEDICINE

## 2022-09-29 PROCEDURE — 1159F PR MEDICATION LIST DOCUMENTED IN MEDICAL RECORD: ICD-10-PCS | Mod: CPTII,S$GLB,, | Performed by: INTERNAL MEDICINE

## 2022-09-29 PROCEDURE — 93010 EKG 12-LEAD: ICD-10-PCS | Mod: ,,, | Performed by: INTERNAL MEDICINE

## 2022-09-29 PROCEDURE — 1160F PR REVIEW ALL MEDS BY PRESCRIBER/CLIN PHARMACIST DOCUMENTED: ICD-10-PCS | Mod: CPTII,S$GLB,, | Performed by: INTERNAL MEDICINE

## 2022-09-29 PROCEDURE — 3079F PR MOST RECENT DIASTOLIC BLOOD PRESSURE 80-89 MM HG: ICD-10-PCS | Mod: CPTII,S$GLB,, | Performed by: INTERNAL MEDICINE

## 2022-09-29 PROCEDURE — 93005 ELECTROCARDIOGRAM TRACING: CPT

## 2022-09-29 PROCEDURE — 3008F PR BODY MASS INDEX (BMI) DOCUMENTED: ICD-10-PCS | Mod: CPTII,S$GLB,, | Performed by: INTERNAL MEDICINE

## 2022-09-29 PROCEDURE — 99999 PR PBB SHADOW E&M-EST. PATIENT-LVL III: ICD-10-PCS | Mod: PBBFAC,,, | Performed by: INTERNAL MEDICINE

## 2022-09-29 PROCEDURE — 99999 PR PBB SHADOW E&M-EST. PATIENT-LVL III: CPT | Mod: PBBFAC,,, | Performed by: INTERNAL MEDICINE

## 2022-09-29 PROCEDURE — 3074F PR MOST RECENT SYSTOLIC BLOOD PRESSURE < 130 MM HG: ICD-10-PCS | Mod: CPTII,S$GLB,, | Performed by: INTERNAL MEDICINE

## 2022-09-29 PROCEDURE — 99214 PR OFFICE/OUTPT VISIT, EST, LEVL IV, 30-39 MIN: ICD-10-PCS | Mod: S$GLB,,, | Performed by: INTERNAL MEDICINE

## 2022-09-29 PROCEDURE — 3079F DIAST BP 80-89 MM HG: CPT | Mod: CPTII,S$GLB,, | Performed by: INTERNAL MEDICINE

## 2022-09-29 RX ORDER — AMLODIPINE BESYLATE 5 MG/1
5 TABLET ORAL DAILY
Qty: 90 TABLET | Refills: 3 | Status: SHIPPED | OUTPATIENT
Start: 2022-09-29 | End: 2023-11-05 | Stop reason: SDUPTHER

## 2022-09-29 RX ORDER — HYDROCHLOROTHIAZIDE 25 MG/1
25 TABLET ORAL DAILY
Qty: 90 TABLET | Refills: 1 | Status: SHIPPED | OUTPATIENT
Start: 2022-09-29 | End: 2023-04-30 | Stop reason: SDUPTHER

## 2022-09-29 NOTE — PROGRESS NOTES
Subjective:   Patient ID:  Marco A Perez is a 57 y.o. male who presents for cardiac consult of No chief complaint on file.      HPI  The patient came in today for cardiac consult of No chief complaint on file.      Marco A Perez is a 57 y.o. male with current medical conditions HTN, HLD,  Migraines presents for for follow up CV eval.      3/5/19  BP is much more elevated today than prior visits. Thinks it may be due to white coat HTN. Normally at home BP is 140s/60s-70s. Pt said he has not been to sleep study yet but he does snore and his wife has been trying to have him evaluated for JUAN. He had some salty food this AM - sausage.     7/10/20  PT has not been seen since 3/2019. He cancelled appt in past due to COVID. He had out of HCTZ but refilled now. He has no recent labs.     2/2/21  BP and HR well controlled today. Overall feels well, no CP/SOB. Chol improved. No recent migraines. He has been working out.   ECG - NSR, nonspec T wave changes     6/30/21  BP and HR well controlled. He has gotten COVID vaccines. Bp at home 120s/80s. HR in 60s to 70s. No edema.     9/29/22  Follow up since 6/2021. BP and HR well controlled. BMI 26 - 186 lbs.     Patient feels no chest pain, no sob, no leg swelling, no PND, no palpitation, no dizziness, no syncope, no CNS symptoms.    Patient has fairly good exercise tolerance.  Works as  for [employer].     Patient is compliant with medications.    FH - Mother - DM, ESRD / Father - HTN, MI      ECG - NSR, nonspec T wave change, LVH      CONCLUSIONS     1 - Concentric hypertrophy.     2 - No wall motion abnormalities.     3 - Normal left ventricular systolic function (EF 60-65%).     4 - Normal left ventricular diastolic function.     5 - Normal right ventricular systolic function .     6 - The estimated PA systolic pressure is greater than 23 mmHg.     7 - Mild tricuspid regurgitation.         This document has been electronically    SIGNED BY: Gurpreet Johnson MD On: 03/14/2019  05:09        Past Medical History:   Diagnosis Date    HTN (hypertension)     Hyperlipidemia     Migraine     JUAN (obstructive sleep apnea) 2017    mild t mod       Past Surgical History:   Procedure Laterality Date    COLONOSCOPY N/A 11/10/2017    Procedure: COLONOSCOPY;  Surgeon: Reed Penny MD;  Location: [facility];  Service: Endoscopy;  Laterality: N/A;       Social History     Tobacco Use    Smoking status: Never    Smokeless tobacco: Never   Substance Use Topics    Alcohol use: No    Drug use: No       Family History   Problem Relation Age of Onset    Heart disease Mother     Heart disease Father     Diabetes Sister        Patient's Medications   New Prescriptions    No medications on file   Previous Medications    AMLODIPINE (NORVASC) 5 MG TABLET    Take 1 tablet (5 mg total) by mouth once daily.    ASPIRIN (ECOTRIN) 81 MG EC TABLET    Take 81 mg by mouth once daily.    BUTALBITAL-ACETAMINOPHEN-CAFFEINE -40 MG (FIORICET, ESGIC) -40 MG PER TABLET    TAKE ONE TO TWO TABLETS BY MOUTH EVERY 4 HOURS AS NEEDED    HYDROCHLOROTHIAZIDE (HYDRODIURIL) 25 MG TABLET    Take 1 tablet (25 mg total) by mouth once daily.    HYDROCODONE-ACETAMINOPHEN (NORCO) 5-325 MG PER TABLET    Take 1 tablet by mouth 4 (four) times daily as needed.    PRAVASTATIN (PRAVACHOL) 40 MG TABLET    Take 1 tablet by mouth once daily   Modified Medications    No medications on file   Discontinued Medications    No medications on file       Review of Systems   Constitutional: Negative.    HENT: Negative.     Eyes: Negative.    Respiratory: Negative.     Cardiovascular: Negative.    Gastrointestinal: Negative.    Genitourinary: Negative.    Musculoskeletal: Negative.    Skin: Negative.    Neurological: Negative.    Endo/Heme/Allergies: Negative.    Psychiatric/Behavioral: Negative.     All 12 systems otherwise negative.    Wt Readings from Last 3 Encounters:   05/13/22 84.1 kg (185 lb 6.5 oz)   12/09/21 83.4 kg (183 lb 13.8 oz)    06/30/21 84.4 kg (186 lb 1.1 oz)     Temp Readings from Last 3 Encounters:   12/09/21 98.7 °F (37.1 °C) (Oral)   12/08/20 97.9 °F (36.6 °C) (Tympanic)   10/22/20 97 °F (36.1 °C) (Tympanic)     BP Readings from Last 3 Encounters:   05/13/22 124/82   12/09/21 116/78   06/30/21 120/82     Pulse Readings from Last 3 Encounters:   05/13/22 70   12/09/21 83   06/30/21 74       There were no vitals taken for this visit.    Objective:   Physical Exam  Vitals and nursing note reviewed.   Constitutional:       General: He is not in acute distress.     Appearance: He is well-developed. He is not diaphoretic.   HENT:      Head: Normocephalic and atraumatic.      Nose: Nose normal.   Eyes:      General: No scleral icterus.     Conjunctiva/sclera: Conjunctivae normal.   Neck:      Thyroid: No thyromegaly.      Vascular: No JVD.   Cardiovascular:      Rate and Rhythm: Normal rate and regular rhythm.      Heart sounds: S1 normal and S2 normal. No murmur heard.    No friction rub. No gallop. No S3 or S4 sounds.   Pulmonary:      Effort: Pulmonary effort is normal. No respiratory distress.      Breath sounds: Normal breath sounds. No stridor. No wheezing or rales.   Chest:      Chest wall: No tenderness.   Abdominal:      General: Bowel sounds are normal. There is no distension.      Palpations: Abdomen is soft. There is no mass.      Tenderness: There is no abdominal tenderness. There is no rebound.   Genitourinary:     Comments: Deferred  Musculoskeletal:         General: No tenderness or deformity. Normal range of motion.      Cervical back: Normal range of motion and neck supple.   Lymphadenopathy:      Cervical: No cervical adenopathy.   Skin:     General: Skin is warm and dry.      Coloration: Skin is not pale.      Findings: No erythema or rash.   Neurological:      Mental Status: He is alert and oriented to person, place, and time.      Motor: No abnormal muscle tone.      Coordination: Coordination normal.   Psychiatric:          Behavior: Behavior normal.         Thought Content: Thought content normal.         Judgment: Judgment normal.       Lab Results   Component Value Date     12/04/2021    K 3.7 12/04/2021     12/04/2021    CO2 26 12/04/2021    BUN 15 12/04/2021    CREATININE 1.3 12/04/2021    GLU 89 12/04/2021    MG 2.2 07/10/2020    AST 14 12/04/2021    ALT 16 12/04/2021    ALBUMIN 4.1 12/04/2021    PROT 7.1 12/04/2021    BILITOT 0.4 12/04/2021    WBC 3.70 (L) 12/04/2021    HGB 13.1 (L) 12/04/2021    HCT 41.5 12/04/2021    MCV 85 12/04/2021     12/04/2021    TSH 1.971 12/03/2012    CHOL 148 12/04/2021    HDL 50 12/04/2021    LDLCALC 82.8 12/04/2021    TRIG 76 12/04/2021     Assessment:      1. Essential hypertension    2. Other hyperlipidemia    3. Other migraine without status migrainosus, not intractable    4. JUAN (obstructive sleep apnea)          Plan:   1. HTN -   - titrate meds  - cont HCTZ  25mg - but discussed to hold if BP low or feels dry   - low salt diet  - 2D ECHO to eval LVH/valves - negative    2. JUAN symptoms - neg for JUAN per pt  - refer to sleep study - said was normal at work in past    3. HLD - improved, LDL 84  - cont statin  - low chetna diet    4. Migraines  - cont tx per PCP    Thank you for allowing me to participate in this patient's care. Please do not hesitate to contact me with any questions or concerns. Consult note has been forwarded to the referral physician.

## 2022-11-10 ENCOUNTER — OFFICE VISIT (OUTPATIENT)
Dept: INTERNAL MEDICINE | Facility: CLINIC | Age: 57
End: 2022-11-10
Payer: COMMERCIAL

## 2022-11-10 VITALS
SYSTOLIC BLOOD PRESSURE: 132 MMHG | BODY MASS INDEX: 25.34 KG/M2 | WEIGHT: 181 LBS | DIASTOLIC BLOOD PRESSURE: 76 MMHG | OXYGEN SATURATION: 98 % | HEIGHT: 71 IN | TEMPERATURE: 97 F | HEART RATE: 70 BPM

## 2022-11-10 DIAGNOSIS — R20.2 PARESTHESIAS IN RIGHT HAND: ICD-10-CM

## 2022-11-10 DIAGNOSIS — M50.30 DDD (DEGENERATIVE DISC DISEASE), CERVICAL: Primary | ICD-10-CM

## 2022-11-10 PROCEDURE — 3075F PR MOST RECENT SYSTOLIC BLOOD PRESS GE 130-139MM HG: ICD-10-PCS | Mod: CPTII,S$GLB,, | Performed by: NURSE PRACTITIONER

## 2022-11-10 PROCEDURE — 99999 PR PBB SHADOW E&M-EST. PATIENT-LVL III: CPT | Mod: PBBFAC,,, | Performed by: NURSE PRACTITIONER

## 2022-11-10 PROCEDURE — 1159F PR MEDICATION LIST DOCUMENTED IN MEDICAL RECORD: ICD-10-PCS | Mod: CPTII,S$GLB,, | Performed by: NURSE PRACTITIONER

## 2022-11-10 PROCEDURE — 3008F BODY MASS INDEX DOCD: CPT | Mod: CPTII,S$GLB,, | Performed by: NURSE PRACTITIONER

## 2022-11-10 PROCEDURE — 3078F DIAST BP <80 MM HG: CPT | Mod: CPTII,S$GLB,, | Performed by: NURSE PRACTITIONER

## 2022-11-10 PROCEDURE — 3008F PR BODY MASS INDEX (BMI) DOCUMENTED: ICD-10-PCS | Mod: CPTII,S$GLB,, | Performed by: NURSE PRACTITIONER

## 2022-11-10 PROCEDURE — 99213 OFFICE O/P EST LOW 20 MIN: CPT | Mod: S$GLB,,, | Performed by: NURSE PRACTITIONER

## 2022-11-10 PROCEDURE — 3075F SYST BP GE 130 - 139MM HG: CPT | Mod: CPTII,S$GLB,, | Performed by: NURSE PRACTITIONER

## 2022-11-10 PROCEDURE — 3078F PR MOST RECENT DIASTOLIC BLOOD PRESSURE < 80 MM HG: ICD-10-PCS | Mod: CPTII,S$GLB,, | Performed by: NURSE PRACTITIONER

## 2022-11-10 PROCEDURE — 1159F MED LIST DOCD IN RCRD: CPT | Mod: CPTII,S$GLB,, | Performed by: NURSE PRACTITIONER

## 2022-11-10 PROCEDURE — 99999 PR PBB SHADOW E&M-EST. PATIENT-LVL III: ICD-10-PCS | Mod: PBBFAC,,, | Performed by: NURSE PRACTITIONER

## 2022-11-10 PROCEDURE — 99213 PR OFFICE/OUTPT VISIT, EST, LEVL III, 20-29 MIN: ICD-10-PCS | Mod: S$GLB,,, | Performed by: NURSE PRACTITIONER

## 2022-11-10 RX ORDER — MELOXICAM 15 MG/1
15 TABLET ORAL DAILY
Qty: 14 TABLET | Refills: 0 | Status: SHIPPED | OUTPATIENT
Start: 2022-11-10

## 2022-11-10 NOTE — PROGRESS NOTES
Subjective:       Patient ID: Marco A Perez is a 57 y.o. male.    Chief Complaint: Numbness    HPI    Reports pain and numbness in R hand for months getting worse. Worse at night. Pain 6/10. He reports some locking in right thumb. No weakness noted.  Intermittent neck pain. Neck xray prev shows cervical DDD  EMG ordered in May- never scheduled.        Past Medical History:   Diagnosis Date    HTN (hypertension)     Hyperlipidemia     Migraine     JUAN (obstructive sleep apnea) 2017    mild t mod       Past Surgical History:   Procedure Laterality Date    COLONOSCOPY N/A 11/10/2017    Procedure: COLONOSCOPY;  Surgeon: Reed Penny MD;  Location: [facility];  Service: Endoscopy;  Laterality: N/A;     Social History     Socioeconomic History    Marital status:    Tobacco Use    Smoking status: Never    Smokeless tobacco: Never   Substance and Sexual Activity    Alcohol use: No    Drug use: No     Review of patient's allergies indicates:   Allergen Reactions    Nsaids (non-steroidal anti-inflammatory drug)      Renal insuff     Current Outpatient Medications   Medication Sig    amLODIPine (NORVASC) 5 MG tablet Take 1 tablet (5 mg total) by mouth once daily.    aspirin (ECOTRIN) 81 MG EC tablet Take 81 mg by mouth once daily.    butalbital-acetaminophen-caffeine -40 mg (FIORICET, ESGIC) -40 mg per tablet TAKE ONE TO TWO TABLETS BY MOUTH EVERY 4 HOURS AS NEEDED    hydroCHLOROthiazide (HYDRODIURIL) 25 MG tablet Take 1 tablet (25 mg total) by mouth once daily.    HYDROcodone-acetaminophen (NORCO) 5-325 mg per tablet Take 1 tablet by mouth 4 (four) times daily as needed.    pravastatin (PRAVACHOL) 40 MG tablet Take 1 tablet by mouth once daily    meloxicam (MOBIC) 15 MG tablet Take 1 tablet (15 mg total) by mouth once daily.     No current facility-administered medications for this visit.           Review of Systems   Constitutional:  Negative for unexpected weight change.   HENT:   Negative for hearing loss, rhinorrhea and trouble swallowing.    Eyes:  Negative for discharge and visual disturbance.   Respiratory:  Negative for chest tightness and wheezing.    Cardiovascular:  Negative for chest pain and palpitations.   Gastrointestinal:  Negative for blood in stool, constipation, diarrhea and vomiting.   Endocrine: Negative for polydipsia and polyuria.   Genitourinary:  Negative for difficulty urinating, hematuria and urgency.   Musculoskeletal:  Positive for arthralgias, myalgias and neck pain. Negative for joint swelling.   Neurological:  Positive for numbness. Negative for weakness and headaches.   Psychiatric/Behavioral:  Negative for confusion and dysphoric mood.      Objective:      Physical Exam  Vitals reviewed.   Musculoskeletal:      Cervical back: Normal.      Comments: Neg tinels and phalens   Neurological:      Mental Status: He is alert and oriented to person, place, and time.       Assessment:     Vitals:    11/10/22 1407   BP: 132/76   Pulse: 70   Temp: 97.1 °F (36.2 °C)         1. DDD (degenerative disc disease), cervical    2. Paresthesias in right hand        Plan:   DDD (degenerative disc disease), cervical    Paresthesias in right hand  -     EMG W/ ULTRASOUND AND NERVE CONDUCTION TEST 1 Extremity; Future    Other orders  -     meloxicam (MOBIC) 15 MG tablet; Take 1 tablet (15 mg total) by mouth once daily.  Dispense: 14 tablet; Refill: 0

## 2022-11-13 NOTE — TELEPHONE ENCOUNTER
No new care gaps identified.  [facility] Embedded Care Gaps. Reference number: [id number]. 11/13/2022   12:19:11 PM CST

## 2022-11-14 RX ORDER — PRAVASTATIN SODIUM 40 MG/1
40 TABLET ORAL DAILY
Qty: 90 TABLET | Refills: 0 | Status: SHIPPED | OUTPATIENT
Start: 2022-11-14 | End: 2023-02-05 | Stop reason: SDUPTHER

## 2022-11-14 NOTE — TELEPHONE ENCOUNTER
Refill Decision Note   Marco A Perez  is requesting a refill authorization.  Brief Assessment and Rationale for Refill:  Approve     Medication Therapy Plan:       Medication Reconciliation Completed: No   Comments:     No Care Gaps recommended.     Note composed:2:00 PM 11/14/2022

## 2022-12-05 ENCOUNTER — LAB VISIT (OUTPATIENT)
Dept: LAB | Facility: HOSPITAL | Age: 57
End: 2022-12-05
Attending: FAMILY MEDICINE
Payer: COMMERCIAL

## 2022-12-05 DIAGNOSIS — Z12.5 SCREENING FOR PROSTATE CANCER: ICD-10-CM

## 2022-12-05 DIAGNOSIS — I10 ESSENTIAL HYPERTENSION: ICD-10-CM

## 2022-12-05 LAB
ALBUMIN SERPL BCP-MCNC: 4.3 G/DL (ref 3.5–5.2)
ALP SERPL-CCNC: 36 U/L (ref 55–135)
ALT SERPL W/O P-5'-P-CCNC: 15 U/L (ref 10–44)
ANION GAP SERPL CALC-SCNC: 8 MMOL/L (ref 8–16)
AST SERPL-CCNC: 16 U/L (ref 10–40)
BILIRUB SERPL-MCNC: 0.6 MG/DL (ref 0.1–1)
BUN SERPL-MCNC: 16 MG/DL (ref 6–20)
CALCIUM SERPL-MCNC: 9.6 MG/DL (ref 8.7–10.5)
CHLORIDE SERPL-SCNC: 104 MMOL/L (ref 95–110)
CHOLEST SERPL-MCNC: 133 MG/DL (ref 120–199)
CHOLEST/HDLC SERPL: 2.7 {RATIO} (ref 2–5)
CO2 SERPL-SCNC: 28 MMOL/L (ref 23–29)
COMPLEXED PSA SERPL-MCNC: 0.89 NG/ML (ref 0–4)
CREAT SERPL-MCNC: 1.5 MG/DL (ref 0.5–1.4)
EST. GFR  (NO RACE VARIABLE): 54.3 ML/MIN/1.73 M^2
GLUCOSE SERPL-MCNC: 94 MG/DL (ref 70–110)
HDLC SERPL-MCNC: 49 MG/DL (ref 40–75)
HDLC SERPL: 36.8 % (ref 20–50)
LDLC SERPL CALC-MCNC: 70.4 MG/DL (ref 63–159)
NONHDLC SERPL-MCNC: 84 MG/DL
POTASSIUM SERPL-SCNC: 3.6 MMOL/L (ref 3.5–5.1)
PROT SERPL-MCNC: 7.2 G/DL (ref 6–8.4)
SODIUM SERPL-SCNC: 140 MMOL/L (ref 136–145)
TRIGL SERPL-MCNC: 68 MG/DL (ref 30–150)

## 2022-12-05 PROCEDURE — 80061 LIPID PANEL: CPT | Performed by: FAMILY MEDICINE

## 2022-12-05 PROCEDURE — 36415 COLL VENOUS BLD VENIPUNCTURE: CPT | Performed by: FAMILY MEDICINE

## 2022-12-05 PROCEDURE — 84153 ASSAY OF PSA TOTAL: CPT | Performed by: FAMILY MEDICINE

## 2022-12-05 PROCEDURE — 80053 COMPREHEN METABOLIC PANEL: CPT | Performed by: FAMILY MEDICINE

## 2022-12-12 ENCOUNTER — OFFICE VISIT (OUTPATIENT)
Dept: INTERNAL MEDICINE | Facility: CLINIC | Age: 57
End: 2022-12-12
Payer: COMMERCIAL

## 2022-12-12 VITALS
WEIGHT: 185.44 LBS | BODY MASS INDEX: 25.96 KG/M2 | OXYGEN SATURATION: 97 % | DIASTOLIC BLOOD PRESSURE: 82 MMHG | HEIGHT: 71 IN | TEMPERATURE: 98 F | SYSTOLIC BLOOD PRESSURE: 126 MMHG | HEART RATE: 73 BPM

## 2022-12-12 DIAGNOSIS — Z12.11 SCREEN FOR COLON CANCER: ICD-10-CM

## 2022-12-12 DIAGNOSIS — I10 ESSENTIAL HYPERTENSION: ICD-10-CM

## 2022-12-12 DIAGNOSIS — Z12.5 SCREENING FOR PROSTATE CANCER: ICD-10-CM

## 2022-12-12 DIAGNOSIS — Z00.00 ROUTINE HEALTH MAINTENANCE: Primary | ICD-10-CM

## 2022-12-12 DIAGNOSIS — G89.29 CHRONIC LOW BACK PAIN WITHOUT SCIATICA, UNSPECIFIED BACK PAIN LATERALITY: ICD-10-CM

## 2022-12-12 DIAGNOSIS — M54.50 CHRONIC LOW BACK PAIN WITHOUT SCIATICA, UNSPECIFIED BACK PAIN LATERALITY: ICD-10-CM

## 2022-12-12 PROCEDURE — 3008F PR BODY MASS INDEX (BMI) DOCUMENTED: ICD-10-PCS | Mod: CPTII,S$GLB,, | Performed by: FAMILY MEDICINE

## 2022-12-12 PROCEDURE — 3008F BODY MASS INDEX DOCD: CPT | Mod: CPTII,S$GLB,, | Performed by: FAMILY MEDICINE

## 2022-12-12 PROCEDURE — 99396 PREV VISIT EST AGE 40-64: CPT | Mod: 25,S$GLB,, | Performed by: FAMILY MEDICINE

## 2022-12-12 PROCEDURE — 90686 FLU VACCINE (QUAD) GREATER THAN OR EQUAL TO 3YO PRESERVATIVE FREE IM: ICD-10-PCS | Mod: S$GLB,,, | Performed by: FAMILY MEDICINE

## 2022-12-12 PROCEDURE — 99396 PR PREVENTIVE VISIT,EST,40-64: ICD-10-PCS | Mod: 25,S$GLB,, | Performed by: FAMILY MEDICINE

## 2022-12-12 PROCEDURE — 3074F SYST BP LT 130 MM HG: CPT | Mod: CPTII,S$GLB,, | Performed by: FAMILY MEDICINE

## 2022-12-12 PROCEDURE — 3079F DIAST BP 80-89 MM HG: CPT | Mod: CPTII,S$GLB,, | Performed by: FAMILY MEDICINE

## 2022-12-12 PROCEDURE — 1159F MED LIST DOCD IN RCRD: CPT | Mod: CPTII,S$GLB,, | Performed by: FAMILY MEDICINE

## 2022-12-12 PROCEDURE — 3079F PR MOST RECENT DIASTOLIC BLOOD PRESSURE 80-89 MM HG: ICD-10-PCS | Mod: CPTII,S$GLB,, | Performed by: FAMILY MEDICINE

## 2022-12-12 PROCEDURE — 90471 FLU VACCINE (QUAD) GREATER THAN OR EQUAL TO 3YO PRESERVATIVE FREE IM: ICD-10-PCS | Mod: S$GLB,,, | Performed by: FAMILY MEDICINE

## 2022-12-12 PROCEDURE — 90471 IMMUNIZATION ADMIN: CPT | Mod: S$GLB,,, | Performed by: FAMILY MEDICINE

## 2022-12-12 PROCEDURE — 99999 PR PBB SHADOW E&M-EST. PATIENT-LVL V: CPT | Mod: PBBFAC,,, | Performed by: FAMILY MEDICINE

## 2022-12-12 PROCEDURE — 90686 IIV4 VACC NO PRSV 0.5 ML IM: CPT | Mod: S$GLB,,, | Performed by: FAMILY MEDICINE

## 2022-12-12 PROCEDURE — 3074F PR MOST RECENT SYSTOLIC BLOOD PRESSURE < 130 MM HG: ICD-10-PCS | Mod: CPTII,S$GLB,, | Performed by: FAMILY MEDICINE

## 2022-12-12 PROCEDURE — 99999 PR PBB SHADOW E&M-EST. PATIENT-LVL V: ICD-10-PCS | Mod: PBBFAC,,, | Performed by: FAMILY MEDICINE

## 2022-12-12 PROCEDURE — 1159F PR MEDICATION LIST DOCUMENTED IN MEDICAL RECORD: ICD-10-PCS | Mod: CPTII,S$GLB,, | Performed by: FAMILY MEDICINE

## 2022-12-12 RX ORDER — SILDENAFIL 100 MG/1
TABLET, FILM COATED ORAL
Qty: 30 TABLET | Refills: 11 | Status: SHIPPED | OUTPATIENT
Start: 2022-12-12

## 2022-12-12 NOTE — Clinical Note
Leighton Adame ordered an emg for him and he is waiting to hear about getting it scheduled.  Thank you Dr Linton

## 2022-12-12 NOTE — PROGRESS NOTES
Subjective:       Patient ID: Marco A Perez is a. male.    Chief Complaint: Annual Exam    HPI Hypertension: blood pressures at home normal. Tolerating medicine.   Inc creat and nl gfr /nephrol; 2021 both nl;stated 1.5 his baseline    Couple months of left lower back pain that does not radiate no trauma more so in the evening after his work as a  was believed seen by some related to his job and was given a physical therapy at that helps guide him to therapy has done a few times.  Infrequent meloxicam.  No abdominal pain no fever or urinary complaints.  No similar in the past.  Increases with standing after prolonged sitting    ED needs rf viagra    Past Medical History:   Diagnosis Date    HTN (hypertension)     Hyperlipidemia     Migraine      Past Surgical History:   Procedure Laterality Date    COLONOSCOPY N/A 11/10/2017    Procedure: COLONOSCOPY;  Surgeon: Reed Penny MD;  Location: [facility];  Service: Endoscopy;  Laterality: N/A;     Family History   Problem Relation Age of Onset    Heart disease Mother     Heart disease Father     Diabetes Sister      Social History     Socioeconomic History    Marital status:      Spouse name: Not on file    Number of children: Not on file    Years of education: Not on file    Highest education level: Not on file   Occupational History    Not on file   Social Needs    Financial resource strain: Not on file    Food insecurity     Worry: Not on file     Inability: Not on file    Transportation needs     Medical: No     Non-medical: No   Tobacco Use    Smoking status: Never Smoker    Smokeless tobacco: Never Used   Substance and Sexual Activity    Alcohol use: No    Drug use: No    Sexual activity: Not on file   Lifestyle    Physical activity     Days per week: Not on file     Minutes per session: Not on file    Stress: Not on file   Relationships    Social connections     Talks on phone: Three times a week     Gets together: Patient refused     Attends  [employer] service: Not on file     Active member of club or organization: No     Attends meetings of clubs or organizations: Never     Relationship status:    Other Topics Concern    Not on file   Social History Narrative    Not on file       Review of Systems    Cardiovascular: no chest pain  Chest: no shortness of breath  Abd: no abd pain  Remainder review of systems negative    Objective:      Physical Exam  Vitals signs and nursing note reviewed.   Constitutional:       Appearance: He is well-developed.   HENT:      Head: Normocephalic and atraumatic.      Right Ear: External ear normal.      Left Ear: External ear normal.   Eyes:      General: No scleral icterus.     Conjunctiva/sclera: Conjunctivae normal.      Pupils: Pupils are equal, round, and reactive to light.   Neck:      Musculoskeletal: Normal range of motion and neck supple.      Vascular: No carotid bruit.   Cardiovascular:      Rate and Rhythm: Normal rate and regular rhythm.      Heart sounds: Normal heart sounds. No murmur. No friction rub. No gallop.    Pulmonary:      Effort: Pulmonary effort is normal.      Breath sounds: Normal breath sounds. No wheezing.   Abdominal:      General: Bowel sounds are normal. There is no distension.      Palpations: Abdomen is soft. There is no mass.      Tenderness: There is no abdominal tenderness. There is no guarding or rebound.   Musculoskeletal: Normal range of motion.         General: No tenderness.   Lymphadenopathy:      Cervical: No cervical adenopathy.   Skin:     General: Skin is warm and dry.      Findings: No erythema or rash.   Neurological:      Mental Status: He is alert and oriented to person, place, and time.      Cranial Nerves: No cranial nerve deficit.      Coordination: Coordination normal.   Psychiatric:         Behavior: Behavior normal.         Thought Content: Thought content normal.         Judgment: Judgment normal.     Lower back he points to his sacroiliac and upper buttock  area skin without rash no tenderness palpation normal range of motion strength of both legs e ISATU test on the right    Assessment:     phys exam  1. Essential hypertension    2. Renal insufficiency hx   3. Screening for prostate cancer         nicole hx?  Low back pain  ED  Plan:       *lab 12 months and follow up after    Add: psg mild to mod nicole 2017 and no f/u after on this then saw pulm 2019 and home sleep test ordered to see if still w nicole (?); test canceled  Prev reordered and pulm after but did not happen    Msg to dr hurd. Staff about emg that was ordered by [place]    Routine health maintenance    Essential hypertension  -     Comprehensive Metabolic Panel; Future; Expected date: 12/12/2023  -     Lipid Panel; Future; Expected date: 12/12/2023    Screening for prostate cancer  -     PSA, Screening; Future; Expected date: 12/12/2023    Screen for colon cancer  -     Ambulatory referral/consult to Endo Procedure ; Future; Expected date: 12/13/2022    Chronic low back pain without sciatica, unspecified back pain laterality  -     Ambulatory referral/consult to Physical/Occupational Therapy; Future; Expected date: 12/19/2022    Other orders  -     Influenza - Quadrivalent (PF)  -     sildenafiL (VIAGRA) 100 MG tablet; Take 1/2 to 1 tablet by mouth one hour prior to intercourse. Max 1 tablet per 24 hours.  Dispense: 30 tablet; Refill: 11     If PT nohelp then refer physiatry ? SI jx dysfxn    If no better 3-4 weekss followup sooner if any worsening or change in symptoms or lack of resolution

## 2022-12-13 ENCOUNTER — HOSPITAL ENCOUNTER (OUTPATIENT)
Dept: PREADMISSION TESTING | Facility: HOSPITAL | Age: 57
Discharge: HOME | End: 2022-12-13
Payer: COMMERCIAL

## 2022-12-13 DIAGNOSIS — Z12.11 SCREEN FOR COLON CANCER: ICD-10-CM

## 2022-12-20 ENCOUNTER — CLINICAL SUPPORT (OUTPATIENT)
Dept: REHABILITATION | Facility: HOSPITAL | Age: 57
End: 2022-12-20
Payer: COMMERCIAL

## 2022-12-20 DIAGNOSIS — G89.29 CHRONIC LOW BACK PAIN WITHOUT SCIATICA, UNSPECIFIED BACK PAIN LATERALITY: ICD-10-CM

## 2022-12-20 DIAGNOSIS — M54.50 CHRONIC LOW BACK PAIN WITHOUT SCIATICA, UNSPECIFIED BACK PAIN LATERALITY: ICD-10-CM

## 2022-12-20 DIAGNOSIS — M25.69 DECREASED RANGE OF MOTION OF TRUNK AND BACK: Primary | ICD-10-CM

## 2022-12-20 PROCEDURE — 97110 THERAPEUTIC EXERCISES: CPT | Mod: PN

## 2022-12-20 PROCEDURE — 97161 PT EVAL LOW COMPLEX 20 MIN: CPT | Mod: PN

## 2022-12-20 PROCEDURE — 97140 MANUAL THERAPY 1/> REGIONS: CPT | Mod: PN

## 2022-12-20 NOTE — PLAN OF CARE
[facility]   Physical Therapy Initial Evaluation     Date: 12/20/2022   Name: Marco A Perez  Clinic Number: [id number]    Therapy Diagnosis:   Encounter Diagnoses   Name Primary?    Chronic low back pain without sciatica, unspecified back pain laterality     Decreased range of motion of trunk and back Yes     Physician: Anders Linton MD    Physician Orders: PT Eval and Treat   Medical Diagnosis from Referral: Chronic low back pain without sciatica, unspecified back pain laterality [M54.50, G89.29]  Evaluation Date: 12/20/2022  Authorization Period Expiration: 12/12/2022 - 12/12/2023  Plan of Care Expiration: 1/20/2023  Progress Note Due: 10th visit or 1/20/2023  Visit # / Visits authorized: 1/ 1 Eval   FOTO: 1/3    Precautions: Standard     Time In: 4:15 pm   Time Out: 5:00 pm  Total Appointment Time (timed & untimed codes): 45 minutes      SUBJECTIVE     Date of onset: initial onset ~1 year ago; recent exacerbation ~1 month ago     History of current condition - Marco A reports: on and off low back pain that normally goes away, however, this episode has linger longer than normal. Pain alternates between right and left with left being the worse. Pt states he primarily sleeps on right side. Greatest difficulty with prolonged sitting position. Notice sharp pain with quick movement.     Falls: none     Imaging, none     Prior Therapy: none   Social History: lives with their spouse  Occupation: ; hobbies - work on cars [twisting, bending]   Prior Level of Function: independent  Current Level of Function: independent with increased pain     Pain:  Current 5/10, worst 9/10, best 0/10   Location: bilateral low back and buttock region   Description: Aching, Dull, and Sharp   Aggravating Factors: prolong flexed, getting up to quick  Easing Factors: hot bath    Patients goals: would like to decrease pain; move without pain.      Medical History:   Past Medical History:   Diagnosis Date     HTN (hypertension)     Hyperlipidemia     Migraine     JUAN (obstructive sleep apnea) 2017    mild t mod       Surgical History:   Marco A Perez  has a past surgical history that includes Colonoscopy (N/A, 11/10/2017).    Medications:   Marco A has a current medication list which includes the following prescription(s): amlodipine, aspirin, butalbital-acetaminophen-caffeine -40 mg, hydrochlorothiazide, meloxicam, pravastatin, and sildenafil.    Allergies:   Review of patient's allergies indicates:   Allergen Reactions    Nsaids (non-steroidal anti-inflammatory drug)      Renal insuff        OBJECTIVE     OBSERVATION: decreased lordosis curvature     POSTURE: mild fwd head, rounded shoulders     SFMA:  Top Tiers Right/Left Comments    Multi-segmental Flexion DP 10% Lordosis hypomobility    Multi-segmental Extension DP 20% Pain to L hip    Multi-segmental side bend  R- FN  L- FP    L- pain to L flank    Multi-segmental Rotation  R- FN   L- DN 10% limited   L - pain to L hip    Single Leg Stance R- FN   L- FN     Deep Squat  DP  Pain to L SI region        STRENGTH: all 5/5 except the following   L hip extension = 4/5 P! To L SI region   B hip ABD = 4+/5     RANGE OF MOTION: all LE WFL    SPECIAL TESTS:  Mallory position = L pain with over pressure   Decreased L hamstring length     SENSATION: intact to light touch     PALPATION: TTP to L g. Med, no TTP noted to lumbar region     JOINT MOBILITY: hypomobility to thoracic and lordosis     Limitation/Restriction for FOTO lower back Survey    Therapist reviewed FOTO scores for Marco A Perez on 12/20/2022.   FOTO documents entered into [employer] - see Media section.    Limitation Score: 28%         TREATMENT     Total Treatment time (time-based codes) separate from Evaluation: 15 minutes      Marco A received the treatments listed below:      therapeutic exercises to develop ROM and flexibility for 10 minutes including:  - prone press up   - seated hamstring stretch   - piriformis  stretch     manual therapy techniques: were applied to L gluteal for 10 minutes, including:   - PA mobs to lumbar region for increased ROM  - Functional Dry Needling for pain reduction     FDN applied to the left  gluteus medius . Dry needling was performed to decrease inflammation, increase circulation, decrease pain and restore homeostasis.      60 mm needles with 0.30 gauge were used. Electrical stimulation was not applied to the needles.    Patient gave written consent to undergo dry needling. Written consent can be found in the media section in pts chart. All needles were removed and changes in signs and symptoms were assessed. No adverse reactions noted at the conclusion of the treatment.         PATIENT EDUCATION AND HOME EXERCISES     Education provided:   - HEP provided     Written Home Exercises Provided: yes. Exercises were reviewed and Marco A was able to demonstrate them prior to the end of the session.  Marco A demonstrated good  understanding of the education provided. See EMR under Patient Instructions for exercises provided during therapy sessions.    ASSESSMENT     Patient presents with signs and symptoms consistent with a diagnosis of low back pain including all above listed objective impairments. It appears that the patients most significant impairments contributing to their diagnosis are decreased range of motion, poor postural awareness, core instability, and trigger point. This pt is a good candidate for skilled PT treatment and stands to benefit from a combination of manual therapy, therapeutic exercise/actvities, neuromuscular re-education, and modalities Prn. The pt has been educated on their dx/POC and consents to further PT treatment.    Patient prognosis is Excellent.   Patient will benefit from skilled outpatient Physical Therapy to address the deficits stated above and in the chart below, provide patient /family education, and to maximize patientt's level of independence.     Plan of care  discussed with patient: Yes  Patient's spiritual, cultural and educational needs considered and patient is agreeable to the plan of care and goals as stated below:     Anticipated Barriers for therapy: none    Medical Necessity is demonstrated by the following  History  Co-morbidities and personal factors that may impact the plan of care Co-morbidities:   none    Personal Factors:   no deficits     low   Examination  Body Structures and Functions, activity limitations and participation restrictions that may impact the plan of care Body Regions:   back  lower extremities    Body Systems:    gross symmetry  ROM  transfers  motor control    Participation Restrictions:   See current level of function    Activity limitations:   Learning and applying knowledge  no deficits    General Tasks and Commands  no deficits    Communication  no deficits    Mobility  lifting and carrying objects  driving (bike, car, motorcycle)    Self care  no deficits    Domestic Life  no deficits    Interactions/Relationships  no deficits    Life Areas  no deficits    Community and Social Life  community life  recreation and leisure         low   Clinical Presentation stable and uncomplicated low   Decision Making/ Complexity Score: low       Goals:   Short Term Goals:  2 weeks  HEP: Patient will demonstrate 50% independence with HEP   Pain: Pt will demonstrate improved pain less than or equal to 4/10   Function: Patient will decrease functional limitation score to less than or equal to 25 out of 100 on FOTO.  Mobility: Patient will improve trunk movements to dysfunctional normal.     Long Term Goals:  4 weeks  HEP: Pt will be independent with advanced HEP.  Pain: Pt will demonstrate improved pain less than or equal to 0/10 in order to progress toward maximal functional ability and improve QOL.  Function: Patient will decrease functional limitation score to less than or equal to 22 out of 100 on FOTO to improve functional independence and  quality of life.   Mobility: Patient will demonstrate trunk mobility to functional normal to return to prior level of function.   Gait: Patient will demonstrate normalized gait mechanics with minimal compensation in order to return to PLOF.  Pt will demonstrate full understanding of good body mechanics and ergonomics to tolerate prolong drives for work.          PLAN   Plan of care Certification: 12/20/2022 to 1/20/2023.    Outpatient Physical Therapy 2 times weekly for 4 weeks to include the following interventions: Gait Training, Manual Therapy, Moist Heat/ Ice, Neuromuscular Re-ed, Therapeutic Activities, Therapeutic Exercise, and FDN.     Madeline Ramirez, PT      I CERTIFY THE NEED FOR THESE SERVICES FURNISHED UNDER THIS PLAN OF TREATMENT AND WHILE UNDER MY CARE   Physician's comments:     Physician's Signature: ___________________________________________________

## 2022-12-21 PROBLEM — M25.69 DECREASED RANGE OF MOTION OF TRUNK AND BACK: Status: ACTIVE | Noted: 2022-12-21

## 2022-12-21 PROBLEM — M54.50 CHRONIC BILATERAL LOW BACK PAIN WITHOUT SCIATICA: Status: ACTIVE | Noted: 2022-12-21

## 2022-12-21 PROBLEM — G89.29 CHRONIC BILATERAL LOW BACK PAIN WITHOUT SCIATICA: Status: ACTIVE | Noted: 2022-12-21

## 2022-12-27 ENCOUNTER — TELEPHONE (OUTPATIENT)
Dept: REHABILITATION | Facility: HOSPITAL | Age: 57
End: 2022-12-27

## 2022-12-27 NOTE — TELEPHONE ENCOUNTER
Called pt regarding today's cancel and how he responded to initial treatment session. Left voicemail to call back if pt had any adverse effect to FDN and reminding pt of next scheduled visit.

## 2022-12-30 ENCOUNTER — TELEPHONE (OUTPATIENT)
Dept: INTERNAL MEDICINE | Facility: CLINIC | Age: 57
End: 2022-12-30
Payer: COMMERCIAL

## 2023-01-04 ENCOUNTER — TELEPHONE (OUTPATIENT)
Dept: REHABILITATION | Facility: HOSPITAL | Age: 58
End: 2023-01-04
Payer: COMMERCIAL

## 2023-01-04 NOTE — TELEPHONE ENCOUNTER
Unable to reach patient. Left VM about today's missed visit and [facility] Cancel/No show policy. Left reminder for next scheduled visit.

## 2023-04-30 DIAGNOSIS — I10 ESSENTIAL HYPERTENSION: ICD-10-CM

## 2023-05-01 RX ORDER — HYDROCHLOROTHIAZIDE 25 MG/1
25 TABLET ORAL DAILY
Qty: 90 TABLET | Refills: 1 | Status: SHIPPED | OUTPATIENT
Start: 2023-05-01 | End: 2023-11-05 | Stop reason: SDUPTHER

## 2023-05-06 NOTE — TELEPHONE ENCOUNTER
No care due was identified.  [facility] Embedded Care Due Messages. Reference number: [id number].   5/06/2023 4:02:32 PM CDT

## 2023-05-09 RX ORDER — BUTALBITAL, ACETAMINOPHEN AND CAFFEINE 50; 325; 40 MG/1; MG/1; MG/1
TABLET ORAL
Qty: 30 TABLET | Refills: 1 | Status: SHIPPED | OUTPATIENT
Start: 2023-05-09

## 2023-09-08 DIAGNOSIS — I10 ESSENTIAL HYPERTENSION: Primary | ICD-10-CM

## 2023-11-09 ENCOUNTER — OFFICE VISIT (OUTPATIENT)
Dept: INTERNAL MEDICINE | Facility: CLINIC | Age: 58
End: 2023-11-09
Payer: COMMERCIAL

## 2023-11-09 ENCOUNTER — LAB VISIT (OUTPATIENT)
Dept: LAB | Facility: HOSPITAL | Age: 58
End: 2023-11-09
Attending: PHYSICIAN ASSISTANT
Payer: COMMERCIAL

## 2023-11-09 VITALS
OXYGEN SATURATION: 98 % | WEIGHT: 190.5 LBS | DIASTOLIC BLOOD PRESSURE: 84 MMHG | TEMPERATURE: 98 F | HEART RATE: 90 BPM | BODY MASS INDEX: 26.67 KG/M2 | SYSTOLIC BLOOD PRESSURE: 136 MMHG | HEIGHT: 71 IN

## 2023-11-09 DIAGNOSIS — R10.13 EPIGASTRIC PAIN: Primary | ICD-10-CM

## 2023-11-09 DIAGNOSIS — R10.13 EPIGASTRIC PAIN: ICD-10-CM

## 2023-11-09 DIAGNOSIS — R10.11 RUQ PAIN: ICD-10-CM

## 2023-11-09 LAB
ALBUMIN SERPL BCP-MCNC: 4.3 G/DL (ref 3.5–5.2)
ALP SERPL-CCNC: 39 U/L (ref 55–135)
ALT SERPL W/O P-5'-P-CCNC: 15 U/L (ref 10–44)
ANION GAP SERPL CALC-SCNC: 10 MMOL/L (ref 8–16)
AST SERPL-CCNC: 16 U/L (ref 10–40)
BASOPHILS # BLD AUTO: 0.03 K/UL (ref 0–0.2)
BASOPHILS NFR BLD: 0.7 % (ref 0–1.9)
BILIRUB SERPL-MCNC: 0.7 MG/DL (ref 0.1–1)
BUN SERPL-MCNC: 11 MG/DL (ref 6–20)
CALCIUM SERPL-MCNC: 9.6 MG/DL (ref 8.7–10.5)
CHLORIDE SERPL-SCNC: 101 MMOL/L (ref 95–110)
CO2 SERPL-SCNC: 29 MMOL/L (ref 23–29)
CREAT SERPL-MCNC: 1.6 MG/DL (ref 0.5–1.4)
DIFFERENTIAL METHOD: ABNORMAL
EOSINOPHIL # BLD AUTO: 0 K/UL (ref 0–0.5)
EOSINOPHIL NFR BLD: 0.9 % (ref 0–8)
ERYTHROCYTE [DISTWIDTH] IN BLOOD BY AUTOMATED COUNT: 13.7 % (ref 11.5–14.5)
EST. GFR  (NO RACE VARIABLE): 50 ML/MIN/1.73 M^2
GLUCOSE SERPL-MCNC: 120 MG/DL (ref 70–110)
HCT VFR BLD AUTO: 38.7 % (ref 40–54)
HGB BLD-MCNC: 12.7 G/DL (ref 14–18)
IMM GRANULOCYTES # BLD AUTO: 0.01 K/UL (ref 0–0.04)
IMM GRANULOCYTES NFR BLD AUTO: 0.2 % (ref 0–0.5)
LYMPHOCYTES # BLD AUTO: 2.1 K/UL (ref 1–4.8)
LYMPHOCYTES NFR BLD: 45.8 % (ref 18–48)
MCH RBC QN AUTO: 27.2 PG (ref 27–31)
MCHC RBC AUTO-ENTMCNC: 32.8 G/DL (ref 32–36)
MCV RBC AUTO: 83 FL (ref 82–98)
MONOCYTES # BLD AUTO: 0.4 K/UL (ref 0.3–1)
MONOCYTES NFR BLD: 7.8 % (ref 4–15)
NEUTROPHILS # BLD AUTO: 2 K/UL (ref 1.8–7.7)
NEUTROPHILS NFR BLD: 44.6 % (ref 38–73)
NRBC BLD-RTO: 0 /100 WBC
PLATELET # BLD AUTO: 286 K/UL (ref 150–450)
PMV BLD AUTO: 9.7 FL (ref 9.2–12.9)
POTASSIUM SERPL-SCNC: 3.5 MMOL/L (ref 3.5–5.1)
PROT SERPL-MCNC: 7.4 G/DL (ref 6–8.4)
RBC # BLD AUTO: 4.67 M/UL (ref 4.6–6.2)
SODIUM SERPL-SCNC: 140 MMOL/L (ref 136–145)
WBC # BLD AUTO: 4.5 K/UL (ref 3.9–12.7)

## 2023-11-09 PROCEDURE — 3008F BODY MASS INDEX DOCD: CPT | Mod: CPTII,S$GLB,, | Performed by: PHYSICIAN ASSISTANT

## 2023-11-09 PROCEDURE — 1159F PR MEDICATION LIST DOCUMENTED IN MEDICAL RECORD: ICD-10-PCS | Mod: CPTII,S$GLB,, | Performed by: PHYSICIAN ASSISTANT

## 2023-11-09 PROCEDURE — 85025 COMPLETE CBC W/AUTO DIFF WBC: CPT | Performed by: PHYSICIAN ASSISTANT

## 2023-11-09 PROCEDURE — 3075F PR MOST RECENT SYSTOLIC BLOOD PRESS GE 130-139MM HG: ICD-10-PCS | Mod: CPTII,S$GLB,, | Performed by: PHYSICIAN ASSISTANT

## 2023-11-09 PROCEDURE — 1160F PR REVIEW ALL MEDS BY PRESCRIBER/CLIN PHARMACIST DOCUMENTED: ICD-10-PCS | Mod: CPTII,S$GLB,, | Performed by: PHYSICIAN ASSISTANT

## 2023-11-09 PROCEDURE — 3079F PR MOST RECENT DIASTOLIC BLOOD PRESSURE 80-89 MM HG: ICD-10-PCS | Mod: CPTII,S$GLB,, | Performed by: PHYSICIAN ASSISTANT

## 2023-11-09 PROCEDURE — 99999 PR PBB SHADOW E&M-EST. PATIENT-LVL IV: CPT | Mod: PBBFAC,,, | Performed by: PHYSICIAN ASSISTANT

## 2023-11-09 PROCEDURE — 36415 COLL VENOUS BLD VENIPUNCTURE: CPT | Performed by: PHYSICIAN ASSISTANT

## 2023-11-09 PROCEDURE — 1160F RVW MEDS BY RX/DR IN RCRD: CPT | Mod: CPTII,S$GLB,, | Performed by: PHYSICIAN ASSISTANT

## 2023-11-09 PROCEDURE — 1159F MED LIST DOCD IN RCRD: CPT | Mod: CPTII,S$GLB,, | Performed by: PHYSICIAN ASSISTANT

## 2023-11-09 PROCEDURE — 3008F PR BODY MASS INDEX (BMI) DOCUMENTED: ICD-10-PCS | Mod: CPTII,S$GLB,, | Performed by: PHYSICIAN ASSISTANT

## 2023-11-09 PROCEDURE — 99999 PR PBB SHADOW E&M-EST. PATIENT-LVL IV: ICD-10-PCS | Mod: PBBFAC,,, | Performed by: PHYSICIAN ASSISTANT

## 2023-11-09 PROCEDURE — 99213 OFFICE O/P EST LOW 20 MIN: CPT | Mod: S$GLB,,, | Performed by: PHYSICIAN ASSISTANT

## 2023-11-09 PROCEDURE — 80053 COMPREHEN METABOLIC PANEL: CPT | Performed by: PHYSICIAN ASSISTANT

## 2023-11-09 PROCEDURE — 3075F SYST BP GE 130 - 139MM HG: CPT | Mod: CPTII,S$GLB,, | Performed by: PHYSICIAN ASSISTANT

## 2023-11-09 PROCEDURE — 99213 PR OFFICE/OUTPT VISIT, EST, LEVL III, 20-29 MIN: ICD-10-PCS | Mod: S$GLB,,, | Performed by: PHYSICIAN ASSISTANT

## 2023-11-09 PROCEDURE — 3079F DIAST BP 80-89 MM HG: CPT | Mod: CPTII,S$GLB,, | Performed by: PHYSICIAN ASSISTANT

## 2023-11-09 NOTE — PROGRESS NOTES
Subjective:      Patient ID: Marco A Perez is a 58 y.o. male.    Chief Complaint: Abdominal Pain    Abdominal Pain  This is a new problem. The current episode started in the past 7 days. The onset quality is gradual. The problem occurs intermittently. The pain is located in the RUQ. The pain is mild. The abdominal pain does not radiate. Pertinent negatives include no anorexia, arthralgias, belching, constipation, diarrhea, dysuria, fever, flatus, frequency, headaches, hematochezia, hematuria, melena, myalgias, nausea, vomiting or weight loss. Nothing aggravates the pain. The pain is relieved by Nothing. He has tried nothing for the symptoms. The treatment provided no relief.   Dull pain under right ribs. No triggers. Nothing makes it better or worse.     Patient Active Problem List   Diagnosis    HTN (hypertension)    Migraine    JUAN (obstructive sleep apnea)    Hyperlipidemia    Chronic low back pain without sciatica    Decreased range of motion of trunk and back         Current Outpatient Medications:     amLODIPine (NORVASC) 5 MG tablet, Take 1 tablet (5 mg total) by mouth once daily., Disp: 90 tablet, Rfl: 0    aspirin (ECOTRIN) 81 MG EC tablet, Take 81 mg by mouth once daily., Disp: , Rfl:     butalbital-acetaminophen-caffeine -40 mg (FIORICET, ESGIC) -40 mg per tablet, TAKE ONE TO TWO TABLETS BY MOUTH EVERY 4 HOURS AS NEEDED, Disp: 30 tablet, Rfl: 1    hydroCHLOROthiazide (HYDRODIURIL) 25 MG tablet, Take 1 tablet (25 mg total) by mouth once daily., Disp: 90 tablet, Rfl: 0    meloxicam (MOBIC) 15 MG tablet, Take 1 tablet (15 mg total) by mouth once daily., Disp: 14 tablet, Rfl: 0    pravastatin (PRAVACHOL) 40 MG tablet, Take 1 tablet (40 mg total) by mouth once daily., Disp: 90 tablet, Rfl: 4    sildenafiL (VIAGRA) 100 MG tablet, Take 1/2 to 1 tablet by mouth one hour prior to intercourse. Max 1 tablet per 24 hours., Disp: 30 tablet, Rfl: 11    Review of Systems   Constitutional:  Negative for  "activity change, appetite change, chills, diaphoresis, fatigue, fever, unexpected weight change and weight loss.   HENT: Negative.  Negative for congestion, hearing loss, postnasal drip, rhinorrhea, sore throat, trouble swallowing and voice change.    Eyes: Negative.  Negative for discharge and visual disturbance.   Respiratory: Negative.  Negative for cough, choking, chest tightness, shortness of breath and wheezing.    Cardiovascular:  Negative for chest pain, palpitations and leg swelling.   Gastrointestinal:  Positive for abdominal pain. Negative for abdominal distention, anal bleeding, anorexia, blood in stool, constipation, diarrhea, flatus, hematochezia, melena, nausea, rectal pain and vomiting.   Endocrine: Negative for cold intolerance, heat intolerance, polydipsia and polyuria.   Genitourinary: Negative.  Negative for difficulty urinating, dysuria, frequency, hematuria and urgency.   Musculoskeletal:  Negative for arthralgias, back pain, gait problem, joint swelling, myalgias and neck pain.   Skin:  Negative for color change, pallor, rash and wound.   Neurological:  Negative for dizziness, tremors, weakness, light-headedness, numbness and headaches.   Hematological:  Negative for adenopathy.   Psychiatric/Behavioral:  Negative for behavioral problems, confusion, dysphoric mood, self-injury, sleep disturbance and suicidal ideas. The patient is not nervous/anxious.      Objective:   /84 (BP Location: Left arm, Patient Position: Sitting, BP Method: Large (Manual))   Pulse 90   Temp 97.8 °F (36.6 °C) (Tympanic)   Ht 5' 11" (1.803 m)   Wt 86.4 kg (190 lb 7.6 oz)   SpO2 98%   BMI 26.57 kg/m²     Physical Exam  Vitals reviewed.   Constitutional:       General: He is not in acute distress.     Appearance: Normal appearance. He is well-developed. He is not ill-appearing, toxic-appearing or diaphoretic.   HENT:      Head: Normocephalic and atraumatic.      Right Ear: External ear normal.      Left Ear: " External ear normal.      Nose: Nose normal.   Eyes:      Conjunctiva/sclera: Conjunctivae normal.      Pupils: Pupils are equal, round, and reactive to light.   Cardiovascular:      Rate and Rhythm: Normal rate and regular rhythm.      Heart sounds: Normal heart sounds. No murmur heard.     No friction rub. No gallop.   Pulmonary:      Effort: Pulmonary effort is normal. No respiratory distress.      Breath sounds: Normal breath sounds. No wheezing or rales.   Chest:      Chest wall: No tenderness.   Abdominal:      General: Abdomen is flat. Bowel sounds are normal. There is no distension.      Palpations: Abdomen is soft.      Tenderness: There is abdominal tenderness in the epigastric area. There is no guarding or rebound.       Musculoskeletal:         General: Normal range of motion.      Cervical back: Normal range of motion and neck supple.   Lymphadenopathy:      Cervical: No cervical adenopathy.   Skin:     General: Skin is warm and dry.      Capillary Refill: Capillary refill takes less than 2 seconds.      Findings: No rash.   Neurological:      Mental Status: He is alert and oriented to person, place, and time.      Motor: No weakness.      Coordination: Coordination normal.      Gait: Gait normal.   Psychiatric:         Mood and Affect: Mood normal.         Behavior: Behavior normal.         Thought Content: Thought content normal.         Judgment: Judgment normal.         Assessment:     1. Epigastric pain    2. RUQ pain      Plan:   Epigastric pain  -     US Abdomen Limited; Future; Expected date: 11/09/2023  -     CBC Auto Differential; Future; Expected date: 11/09/2023  -     Comprehensive Metabolic Panel; Future; Expected date: 11/09/2023    RUQ pain  -     US Abdomen Limited; Future; Expected date: 11/09/2023  -     CBC Auto Differential; Future; Expected date: 11/09/2023  -     Comprehensive Metabolic Panel; Future; Expected date: 11/09/2023      -bland foods  -increase water  -pepcid twice  daily     Follow up if symptoms worsen or fail to improve.

## 2023-11-10 ENCOUNTER — APPOINTMENT (OUTPATIENT)
Dept: RADIOLOGY | Facility: HOSPITAL | Age: 58
End: 2023-11-10
Attending: PHYSICIAN ASSISTANT
Payer: COMMERCIAL

## 2023-11-10 DIAGNOSIS — R10.13 EPIGASTRIC PAIN: ICD-10-CM

## 2023-11-10 DIAGNOSIS — R10.11 RUQ PAIN: ICD-10-CM

## 2023-11-10 PROCEDURE — 76705 ECHO EXAM OF ABDOMEN: CPT | Mod: TC,PO

## 2023-11-10 PROCEDURE — 76705 ECHO EXAM OF ABDOMEN: CPT | Mod: 26,,, | Performed by: RADIOLOGY

## 2023-11-10 PROCEDURE — 76705 US ABDOMEN LIMITED: ICD-10-PCS | Mod: 26,,, | Performed by: RADIOLOGY

## 2023-12-05 ENCOUNTER — LAB VISIT (OUTPATIENT)
Dept: LAB | Facility: HOSPITAL | Age: 58
End: 2023-12-05
Attending: FAMILY MEDICINE
Payer: COMMERCIAL

## 2023-12-05 DIAGNOSIS — Z12.5 SCREENING FOR PROSTATE CANCER: ICD-10-CM

## 2023-12-05 DIAGNOSIS — I10 ESSENTIAL HYPERTENSION: ICD-10-CM

## 2023-12-05 LAB
ALBUMIN SERPL BCP-MCNC: 4.1 G/DL (ref 3.5–5.2)
ALP SERPL-CCNC: 41 U/L (ref 55–135)
ALT SERPL W/O P-5'-P-CCNC: 16 U/L (ref 10–44)
ANION GAP SERPL CALC-SCNC: 10 MMOL/L (ref 8–16)
AST SERPL-CCNC: 17 U/L (ref 10–40)
BILIRUB SERPL-MCNC: 0.4 MG/DL (ref 0.1–1)
BUN SERPL-MCNC: 12 MG/DL (ref 6–20)
CALCIUM SERPL-MCNC: 9.3 MG/DL (ref 8.7–10.5)
CHLORIDE SERPL-SCNC: 104 MMOL/L (ref 95–110)
CHOLEST SERPL-MCNC: 163 MG/DL (ref 120–199)
CHOLEST/HDLC SERPL: 3.6 {RATIO} (ref 2–5)
CO2 SERPL-SCNC: 28 MMOL/L (ref 23–29)
COMPLEXED PSA SERPL-MCNC: 0.69 NG/ML (ref 0–4)
CREAT SERPL-MCNC: 1.3 MG/DL (ref 0.5–1.4)
EST. GFR  (NO RACE VARIABLE): >60 ML/MIN/1.73 M^2
GLUCOSE SERPL-MCNC: 93 MG/DL (ref 70–110)
HDLC SERPL-MCNC: 45 MG/DL (ref 40–75)
HDLC SERPL: 27.6 % (ref 20–50)
LDLC SERPL CALC-MCNC: 99.4 MG/DL (ref 63–159)
NONHDLC SERPL-MCNC: 118 MG/DL
POTASSIUM SERPL-SCNC: 3.9 MMOL/L (ref 3.5–5.1)
PROT SERPL-MCNC: 7.2 G/DL (ref 6–8.4)
SODIUM SERPL-SCNC: 142 MMOL/L (ref 136–145)
TRIGL SERPL-MCNC: 93 MG/DL (ref 30–150)

## 2023-12-05 PROCEDURE — 36415 COLL VENOUS BLD VENIPUNCTURE: CPT | Performed by: FAMILY MEDICINE

## 2023-12-05 PROCEDURE — 80053 COMPREHEN METABOLIC PANEL: CPT | Performed by: FAMILY MEDICINE

## 2023-12-05 PROCEDURE — 84153 ASSAY OF PSA TOTAL: CPT | Performed by: FAMILY MEDICINE

## 2023-12-05 PROCEDURE — 80061 LIPID PANEL: CPT | Performed by: FAMILY MEDICINE

## 2023-12-12 ENCOUNTER — OFFICE VISIT (OUTPATIENT)
Dept: INTERNAL MEDICINE | Facility: CLINIC | Age: 58
End: 2023-12-12
Payer: COMMERCIAL

## 2023-12-12 VITALS
HEART RATE: 77 BPM | WEIGHT: 190.94 LBS | BODY MASS INDEX: 26.73 KG/M2 | DIASTOLIC BLOOD PRESSURE: 82 MMHG | HEIGHT: 71 IN | SYSTOLIC BLOOD PRESSURE: 124 MMHG | OXYGEN SATURATION: 98 % | TEMPERATURE: 98 F

## 2023-12-12 DIAGNOSIS — I10 ESSENTIAL HYPERTENSION: ICD-10-CM

## 2023-12-12 DIAGNOSIS — Z00.00 ROUTINE HEALTH MAINTENANCE: Primary | ICD-10-CM

## 2023-12-12 DIAGNOSIS — Z12.5 SCREENING FOR PROSTATE CANCER: ICD-10-CM

## 2023-12-12 PROBLEM — G47.33 OSA (OBSTRUCTIVE SLEEP APNEA): Status: RESOLVED | Noted: 2017-11-14 | Resolved: 2023-12-12

## 2023-12-12 PROCEDURE — 3074F PR MOST RECENT SYSTOLIC BLOOD PRESSURE < 130 MM HG: ICD-10-PCS | Mod: CPTII,S$GLB,, | Performed by: FAMILY MEDICINE

## 2023-12-12 PROCEDURE — 3079F DIAST BP 80-89 MM HG: CPT | Mod: CPTII,S$GLB,, | Performed by: FAMILY MEDICINE

## 2023-12-12 PROCEDURE — 3074F SYST BP LT 130 MM HG: CPT | Mod: CPTII,S$GLB,, | Performed by: FAMILY MEDICINE

## 2023-12-12 PROCEDURE — 3008F PR BODY MASS INDEX (BMI) DOCUMENTED: ICD-10-PCS | Mod: CPTII,S$GLB,, | Performed by: FAMILY MEDICINE

## 2023-12-12 PROCEDURE — 99999 PR PBB SHADOW E&M-EST. PATIENT-LVL IV: CPT | Mod: PBBFAC,,, | Performed by: FAMILY MEDICINE

## 2023-12-12 PROCEDURE — 99396 PREV VISIT EST AGE 40-64: CPT | Mod: S$GLB,,, | Performed by: FAMILY MEDICINE

## 2023-12-12 PROCEDURE — 3008F BODY MASS INDEX DOCD: CPT | Mod: CPTII,S$GLB,, | Performed by: FAMILY MEDICINE

## 2023-12-12 PROCEDURE — 3079F PR MOST RECENT DIASTOLIC BLOOD PRESSURE 80-89 MM HG: ICD-10-PCS | Mod: CPTII,S$GLB,, | Performed by: FAMILY MEDICINE

## 2023-12-12 PROCEDURE — 99999 PR PBB SHADOW E&M-EST. PATIENT-LVL IV: ICD-10-PCS | Mod: PBBFAC,,, | Performed by: FAMILY MEDICINE

## 2023-12-12 PROCEDURE — 99396 PR PREVENTIVE VISIT,EST,40-64: ICD-10-PCS | Mod: S$GLB,,, | Performed by: FAMILY MEDICINE

## 2023-12-12 NOTE — PATIENT INSTRUCTIONS
"Shingrix new shingles vaccine  via a pharmacy  Tetanus/whooping cough vaccine via pharmacy    Consider "Healthy Back Program" with Dr Smith when ready  "

## 2023-12-12 NOTE — PROGRESS NOTES
Subjective:       Patient ID: Marco A Perez is a. male.    Chief Complaint: Annual Exam    HPI Hypertension: blood pressures at home normal. Tolerating medicine.   Inc creat and nl gfr /nephrol; 2021 both nl;stated 1.5 his baseline    Intermitt f left lower back pain that does not radiate no trauma more so in the evening after his work as a  was believed seen by some related to his job and was given a physical therapy at that helps guide him to therapy has done a few times.  Infrequent meloxicam.  No abdominal pain no fever or urinary complaints. Throughout year 3-4 monts total of pain otw no pain    ED f viagra    Past Medical History:   Diagnosis Date    HTN (hypertension)     Hyperlipidemia     Migraine      Past Surgical History:   Procedure Laterality Date    COLONOSCOPY N/A 11/10/2017    Procedure: COLONOSCOPY;  Surgeon: Reed Penny MD;  Location: [facility];  Service: Endoscopy;  Laterality: N/A;     Family History   Problem Relation Age of Onset    Heart disease Mother     Heart disease Father     Diabetes Sister      Social History     Socioeconomic History    Marital status:      Spouse name: Not on file    Number of children: Not on file    Years of education: Not on file    Highest education level: Not on file   Occupational History    Not on file   Social Needs    Financial resource strain: Not on file    Food insecurity     Worry: Not on file     Inability: Not on file    Transportation needs     Medical: No     Non-medical: No   Tobacco Use    Smoking status: Never Smoker    Smokeless tobacco: Never Used   Substance and Sexual Activity    Alcohol use: No    Drug use: No    Sexual activity: Not on file   Lifestyle    Physical activity     Days per week: Not on file     Minutes per session: Not on file    Stress: Not on file   Relationships    Social connections     Talks on phone: Three times a week     Gets together: Patient refused     Attends [employer] service: Not on file      Active member of club or organization: No     Attends meetings of clubs or organizations: Never     Relationship status:    Other Topics Concern    Not on file   Social History Narrative    Not on file       Review of Systems    Cardiovascular: no chest pain  Chest: no shortness of breath  Abd: no abd pain  Remainder review of systems negative    Objective:      Physical Exam  Vitals signs and nursing note reviewed.   Constitutional:       Appearance: He is well-developed.   HENT:      Head: Normocephalic and atraumatic.      Right Ear: External ear normal.      Left Ear: External ear normal.   Eyes:      General: No scleral icterus.     Conjunctiva/sclera: Conjunctivae normal.      Pupils: Pupils are equal, round, and reactive to light.   Neck:      Musculoskeletal: Normal range of motion and neck supple.      Vascular: No carotid bruit.   Cardiovascular:      Rate and Rhythm: Normal rate and regular rhythm.      Heart sounds: Normal heart sounds. No murmur. No friction rub. No gallop.    Pulmonary:      Effort: Pulmonary effort is normal.      Breath sounds: Normal breath sounds. No wheezing.   Abdominal:      General: Bowel sounds are normal. There is no distension.      Palpations: Abdomen is soft. There is no mass.      Tenderness: There is no abdominal tenderness. There is no guarding or rebound.   Musculoskeletal: Normal range of motion.         General: No tenderness.   Lymphadenopathy:      Cervical: No cervical adenopathy.   Skin:     General: Skin is warm and dry.      Findings: No erythema or rash.   Neurological:      Mental Status: He is alert and oriented to person, place, and time.      Cranial Nerves: No cranial nerve deficit.      Coordination: Coordination normal.   Psychiatric:         Behavior: Behavior normal.         Thought Content: Thought content normal.         Judgment: Judgment normal.         Assessment:     phys exam  1. Essential hypertension    2. Renal insufficiency hx   3.  Screening for prostate cancer          Low back pain  ED  Plan:       *lab 12 months and follow up after    Notify when ready for healthy back program    Abilio patope due 27note now  Has f/u card    Routine health maintenance  -     Hemoglobin A1C; Future; Expected date: 12/12/2024    Essential hypertension  -     Comprehensive Metabolic Panel; Future; Expected date: 12/11/2024  -     Lipid Panel; Future; Expected date: 12/11/2024    Screening for prostate cancer  -     PSA, Screening; Future; Expected date: 12/11/2024

## 2023-12-12 NOTE — Clinical Note
Marleny rEPEAT COLONOSCOPY IN 10 YEARS PER 'S MESSAGE DATED       11/16/2017 (so 2027 and not due now)

## 2024-01-23 ENCOUNTER — TELEPHONE (OUTPATIENT)
Dept: PAIN MEDICINE | Facility: CLINIC | Age: 59
End: 2024-01-23
Payer: COMMERCIAL

## 2024-01-23 NOTE — TELEPHONE ENCOUNTER
----- Message from Noris Cuellar sent at 1/23/2024  2:55 PM CST -----  Contact: self [phone]  Patient called back after missing a call from Amita to schedule him an appointment. Please call back [phone]. Thanks tpw

## 2024-01-23 NOTE — TELEPHONE ENCOUNTER
Reached out to patient to schedule appointment from messages. Apt has been made.   Pt understand. All questions answered.     Brown Cordero  Medical Assistant

## 2024-02-02 DIAGNOSIS — I10 ESSENTIAL HYPERTENSION: Primary | ICD-10-CM

## 2024-02-02 DIAGNOSIS — Z76.89 ENCOUNTER TO ESTABLISH CARE: ICD-10-CM

## 2024-02-07 ENCOUNTER — OFFICE VISIT (OUTPATIENT)
Dept: CARDIOLOGY | Facility: CLINIC | Age: 59
End: 2024-02-07
Payer: COMMERCIAL

## 2024-02-07 ENCOUNTER — HOSPITAL ENCOUNTER (OUTPATIENT)
Dept: CARDIOLOGY | Facility: HOSPITAL | Age: 59
Discharge: HOME | End: 2024-02-07
Attending: INTERNAL MEDICINE
Payer: COMMERCIAL

## 2024-02-07 VITALS
WEIGHT: 189.38 LBS | DIASTOLIC BLOOD PRESSURE: 82 MMHG | OXYGEN SATURATION: 98 % | BODY MASS INDEX: 26.41 KG/M2 | HEART RATE: 77 BPM | SYSTOLIC BLOOD PRESSURE: 116 MMHG

## 2024-02-07 VITALS — WEIGHT: 189.38 LBS | BODY MASS INDEX: 26.41 KG/M2

## 2024-02-07 DIAGNOSIS — I10 ESSENTIAL HYPERTENSION: Primary | ICD-10-CM

## 2024-02-07 DIAGNOSIS — E78.49 OTHER HYPERLIPIDEMIA: ICD-10-CM

## 2024-02-07 DIAGNOSIS — Z76.89 ENCOUNTER TO ESTABLISH CARE: ICD-10-CM

## 2024-02-07 DIAGNOSIS — G43.809 OTHER MIGRAINE WITHOUT STATUS MIGRAINOSUS, NOT INTRACTABLE: ICD-10-CM

## 2024-02-07 DIAGNOSIS — I10 ESSENTIAL HYPERTENSION: ICD-10-CM

## 2024-02-07 DIAGNOSIS — Z86.16 HISTORY OF COVID-19: ICD-10-CM

## 2024-02-07 LAB
OHS QRS DURATION: 88 MS
OHS QTC CALCULATION: 414 MS

## 2024-02-07 PROCEDURE — 93005 ELECTROCARDIOGRAM TRACING: CPT

## 2024-02-07 PROCEDURE — 3079F DIAST BP 80-89 MM HG: CPT | Mod: CPTII,S$GLB,, | Performed by: INTERNAL MEDICINE

## 2024-02-07 PROCEDURE — 1160F RVW MEDS BY RX/DR IN RCRD: CPT | Mod: CPTII,S$GLB,, | Performed by: INTERNAL MEDICINE

## 2024-02-07 PROCEDURE — 99999 PR PBB SHADOW E&M-EST. PATIENT-LVL III: CPT | Mod: PBBFAC,,, | Performed by: INTERNAL MEDICINE

## 2024-02-07 PROCEDURE — 93010 ELECTROCARDIOGRAM REPORT: CPT | Mod: ,,, | Performed by: INTERNAL MEDICINE

## 2024-02-07 PROCEDURE — 99214 OFFICE O/P EST MOD 30 MIN: CPT | Mod: S$GLB,,, | Performed by: INTERNAL MEDICINE

## 2024-02-07 PROCEDURE — 1159F MED LIST DOCD IN RCRD: CPT | Mod: CPTII,S$GLB,, | Performed by: INTERNAL MEDICINE

## 2024-02-07 PROCEDURE — 3008F BODY MASS INDEX DOCD: CPT | Mod: CPTII,S$GLB,, | Performed by: INTERNAL MEDICINE

## 2024-02-07 PROCEDURE — 3074F SYST BP LT 130 MM HG: CPT | Mod: CPTII,S$GLB,, | Performed by: INTERNAL MEDICINE

## 2024-02-07 RX ORDER — HYDROCHLOROTHIAZIDE 25 MG/1
25 TABLET ORAL DAILY
Qty: 90 TABLET | Refills: 3 | Status: SHIPPED | OUTPATIENT
Start: 2024-02-07 | End: 2025-02-06

## 2024-02-07 RX ORDER — AMLODIPINE BESYLATE 5 MG/1
5 TABLET ORAL DAILY
Qty: 90 TABLET | Refills: 3 | Status: SHIPPED | OUTPATIENT
Start: 2024-02-07

## 2024-02-07 NOTE — PROGRESS NOTES
Subjective:   Patient ID:  Marco A Perez is a 58 y.o. male who presents for cardiac consult of No chief complaint on file.        HPI  The patient came in today for cardiac consult of No chief complaint on file.        Marco A Perez is a 58 y.o. male with current medical conditions HTN, HLD,  Migraines, h/o COVID 19 presents for for follow up CV eval.      3/5/19  BP is much more elevated today than prior visits. Thinks it may be due to white coat HTN. Normally at home BP is 140s/60s-70s. Pt said he has not been to sleep study yet but he does snore and his wife has been trying to have him evaluated for JUAN. He had some salty food this AM - sausage.     7/10/20  PT has not been seen since 3/2019. He cancelled appt in past due to COVID. He had out of HCTZ but refilled now. He has no recent labs.     2/2/21  BP and HR well controlled today. Overall feels well, no CP/SOB. Chol improved. No recent migraines. He has been working out.   ECG - NSR, nonspec T wave changes     6/30/21  BP and HR well controlled. He has gotten COVID vaccines. Bp at home 120s/80s. HR in 60s to 70s. No edema.     9/29/22  Follow up since 6/2021. BP and HR well controlled. BMI 26 - 186 lbs.     2/7/24  Follow up since 9/2022. BP and Hr stable. BMI 26 - 189 lbs.   He had COVID 19 last year had Paxlovid.     Patient has fairly good exercise tolerance.  Works as  for [employer].     Patient is compliant with medications.    FH - Mother - DM, ESRD / Father - HTN, MI      ECG - NSR, nonspec T wave change, LVH      CONCLUSIONS     1 - Concentric hypertrophy.     2 - No wall motion abnormalities.     3 - Normal left ventricular systolic function (EF 60-65%).     4 - Normal left ventricular diastolic function.     5 - Normal right ventricular systolic function .     6 - The estimated PA systolic pressure is greater than 23 mmHg.     7 - Mild tricuspid regurgitation.         This document has been electronically    SIGNED BY: Gurpreet Johnson MD On:  03/14/2019 05:09        Past Medical History:   Diagnosis Date    HTN (hypertension)     Hyperlipidemia     Migraine     JUAN (obstructive sleep apnea) 2017    mild t mod       Past Surgical History:   Procedure Laterality Date    COLONOSCOPY N/A 11/10/2017    Procedure: COLONOSCOPY;  Surgeon: Reed Penny MD;  Location: [facility];  Service: Endoscopy;  Laterality: N/A;       Social History     Tobacco Use    Smoking status: Never    Smokeless tobacco: Never   Substance Use Topics    Alcohol use: No    Drug use: No       Family History   Problem Relation Age of Onset    Heart disease Mother     Heart disease Father     Diabetes Sister        Patient's Medications   New Prescriptions    No medications on file   Previous Medications    ASPIRIN (ECOTRIN) 81 MG EC TABLET    Take 81 mg by mouth once daily.    BUTALBITAL-ACETAMINOPHEN-CAFFEINE -40 MG (FIORICET, ESGIC) -40 MG PER TABLET    TAKE ONE TO TWO TABLETS BY MOUTH EVERY 4 HOURS AS NEEDED    MELOXICAM (MOBIC) 15 MG TABLET    Take 1 tablet (15 mg total) by mouth once daily.    PRAVASTATIN (PRAVACHOL) 40 MG TABLET    Take 1 tablet (40 mg total) by mouth once daily.    SILDENAFIL (VIAGRA) 100 MG TABLET    Take 1/2 to 1 tablet by mouth one hour prior to intercourse. Max 1 tablet per 24 hours.   Modified Medications    Modified Medication Previous Medication    AMLODIPINE (NORVASC) 5 MG TABLET amLODIPine (NORVASC) 5 MG tablet       Take 1 tablet (5 mg total) by mouth once daily.    Take 1 tablet (5 mg total) by mouth once daily.    HYDROCHLOROTHIAZIDE (HYDRODIURIL) 25 MG TABLET hydroCHLOROthiazide (HYDRODIURIL) 25 MG tablet       Take 1 tablet (25 mg total) by mouth once daily.    Take 1 tablet (25 mg total) by mouth once daily.   Discontinued Medications    No medications on file       Review of Systems   Constitutional: Negative.    HENT: Negative.     Eyes: Negative.    Respiratory: Negative.     Cardiovascular: Negative.    Gastrointestinal:  Negative.    Genitourinary: Negative.    Musculoskeletal: Negative.    Skin: Negative.    Neurological: Negative.    Endo/Heme/Allergies: Negative.    Psychiatric/Behavioral: Negative.     All 12 systems otherwise negative.      Wt Readings from Last 3 Encounters:   02/07/24 85.9 kg (189 lb 6 oz)   02/07/24 85.9 kg (189 lb 6 oz)   12/12/23 86.6 kg (190 lb 14.7 oz)     Temp Readings from Last 3 Encounters:   12/12/23 97.6 °F (36.4 °C) (Tympanic)   11/09/23 97.8 °F (36.6 °C) (Tympanic)   12/12/22 97.7 °F (36.5 °C) (Tympanic)     BP Readings from Last 3 Encounters:   02/07/24 116/82   12/12/23 124/82   11/09/23 136/84     Pulse Readings from Last 3 Encounters:   02/07/24 77   12/12/23 77   11/09/23 90       /82 (BP Location: Left arm, Patient Position: Sitting, BP Method: Small (Manual))   Pulse 77   Wt 85.9 kg (189 lb 6 oz)   SpO2 98%   BMI 26.41 kg/m²     Objective:   Physical Exam  Vitals and nursing note reviewed.   Constitutional:       General: He is not in acute distress.     Appearance: He is well-developed. He is not diaphoretic.   HENT:      Head: Normocephalic and atraumatic.      Nose: Nose normal.   Eyes:      General: No scleral icterus.     Conjunctiva/sclera: Conjunctivae normal.   Neck:      Thyroid: No thyromegaly.      Vascular: No JVD.   Cardiovascular:      Rate and Rhythm: Normal rate and regular rhythm.      Heart sounds: S1 normal and S2 normal. No murmur heard.     No friction rub. No gallop. No S3 or S4 sounds.   Pulmonary:      Effort: Pulmonary effort is normal. No respiratory distress.      Breath sounds: Normal breath sounds. No stridor. No wheezing or rales.   Chest:      Chest wall: No tenderness.   Abdominal:      General: Bowel sounds are normal. There is no distension.      Palpations: Abdomen is soft. There is no mass.      Tenderness: There is no abdominal tenderness. There is no rebound.   Genitourinary:     Comments: Deferred  Musculoskeletal:         General: No  tenderness or deformity. Normal range of motion.      Cervical back: Normal range of motion and neck supple.   Lymphadenopathy:      Cervical: No cervical adenopathy.   Skin:     General: Skin is warm and dry.      Coloration: Skin is not pale.      Findings: No erythema or rash.   Neurological:      Mental Status: He is alert and oriented to person, place, and time.      Motor: No abnormal muscle tone.      Coordination: Coordination normal.   Psychiatric:         Behavior: Behavior normal.         Thought Content: Thought content normal.         Judgment: Judgment normal.         Lab Results   Component Value Date     12/05/2023    K 3.9 12/05/2023     12/05/2023    CO2 28 12/05/2023    BUN 12 12/05/2023    CREATININE 1.3 12/05/2023    GLU 93 12/05/2023    MG 2.2 07/10/2020    AST 17 12/05/2023    ALT 16 12/05/2023    ALBUMIN 4.1 12/05/2023    PROT 7.2 12/05/2023    BILITOT 0.4 12/05/2023    WBC 4.50 11/09/2023    HGB 12.7 (L) 11/09/2023    HCT 38.7 (L) 11/09/2023    MCV 83 11/09/2023     11/09/2023    TSH 1.971 12/03/2012    CHOL 163 12/05/2023    HDL 45 12/05/2023    LDLCALC 99.4 12/05/2023    TRIG 93 12/05/2023     Assessment:      1. Essential hypertension    2. Other hyperlipidemia    3. Other migraine without status migrainosus, not intractable    4. History of COVID-19            Plan:   1. HTN - stable  - titrate meds  - cont HCTZ  25mg - but discussed to hold if BP low or feels dry   - low salt diet  - 2D ECHO to eval LVH/valves - negative    2. JUAN symptoms - neg for JUAN per pt  - refer to sleep study - said was normal at work in past ?     3. HLD   - cont statin  - low chetna diet    4. Migraines, worsening back pain at times  - will f/u with pain management   - cont tx per PCP    5. H/O COVID 19 - in 2023  - stable     Thank you for allowing me to participate in this patient's care. Please do not hesitate to contact me with any questions or concerns. Consult note has been forwarded to  the referral physician.

## 2024-04-04 DIAGNOSIS — I10 ESSENTIAL HYPERTENSION: ICD-10-CM

## 2024-04-04 RX ORDER — AMLODIPINE BESYLATE 5 MG/1
TABLET ORAL
Refills: 0 | OUTPATIENT
Start: 2024-04-04

## 2024-04-04 RX ORDER — PRAVASTATIN SODIUM 40 MG/1
TABLET ORAL
Refills: 0 | OUTPATIENT
Start: 2024-04-04

## 2024-04-04 RX ORDER — HYDROCHLOROTHIAZIDE 25 MG/1
TABLET ORAL
Refills: 0 | OUTPATIENT
Start: 2024-04-04

## 2024-04-04 NOTE — TELEPHONE ENCOUNTER
Refill Decision Note   Marco A Perez  is requesting a refill authorization.  Brief Assessment and Rationale for Refill:  Quick Discontinue     Medication Therapy Plan:    Pharmacy is requesting new scripts for the following medications without required information, (sig/ frequency/qty/etc)      Medication Reconciliation Completed: No     Comments: Pharmacies have been requesting medications for patients without required information, (sig, frequency, qty, etc.). In addition, requests are sent for medication(s) pt. are currently not taking, and medications patients have never taken.    We have spoken to the pharmacies about these request types and advised their teams previously that we are unable to assess these New Script requests and require all details for these requests. This is a known issue and has been reported.     Note composed:5:46 PM 04/04/2024

## 2024-04-04 NOTE — TELEPHONE ENCOUNTER
No care due was identified.  [facility] Embedded Care Due Messages. Reference number: [id number].   4/04/2024 5:04:26 PM CDT

## 2024-04-16 ENCOUNTER — TELEPHONE (OUTPATIENT)
Dept: PAIN MEDICINE | Facility: CLINIC | Age: 59
End: 2024-04-16
Payer: COMMERCIAL

## 2024-04-17 ENCOUNTER — OFFICE VISIT (OUTPATIENT)
Dept: PAIN MEDICINE | Facility: CLINIC | Age: 59
End: 2024-04-17
Payer: COMMERCIAL

## 2024-04-17 ENCOUNTER — HOSPITAL ENCOUNTER (OUTPATIENT)
Dept: RADIOLOGY | Facility: HOSPITAL | Age: 59
Discharge: HOME | End: 2024-04-17
Attending: PHYSICAL MEDICINE & REHABILITATION
Payer: COMMERCIAL

## 2024-04-17 VITALS
BODY MASS INDEX: 26.85 KG/M2 | WEIGHT: 191.81 LBS | SYSTOLIC BLOOD PRESSURE: 124 MMHG | HEART RATE: 69 BPM | DIASTOLIC BLOOD PRESSURE: 78 MMHG | HEIGHT: 71 IN

## 2024-04-17 DIAGNOSIS — M47.816 LUMBAR SPONDYLOSIS: ICD-10-CM

## 2024-04-17 DIAGNOSIS — M47.816 LUMBAR SPONDYLOSIS: Primary | ICD-10-CM

## 2024-04-17 PROCEDURE — 99203 OFFICE O/P NEW LOW 30 MIN: CPT | Mod: S$GLB,,, | Performed by: PHYSICAL MEDICINE & REHABILITATION

## 2024-04-17 PROCEDURE — 3008F BODY MASS INDEX DOCD: CPT | Mod: CPTII,S$GLB,, | Performed by: PHYSICAL MEDICINE & REHABILITATION

## 2024-04-17 PROCEDURE — 1159F MED LIST DOCD IN RCRD: CPT | Mod: CPTII,S$GLB,, | Performed by: PHYSICAL MEDICINE & REHABILITATION

## 2024-04-17 PROCEDURE — 72114 X-RAY EXAM L-S SPINE BENDING: CPT | Mod: 26,,, | Performed by: RADIOLOGY

## 2024-04-17 PROCEDURE — 3078F DIAST BP <80 MM HG: CPT | Mod: CPTII,S$GLB,, | Performed by: PHYSICAL MEDICINE & REHABILITATION

## 2024-04-17 PROCEDURE — 72114 X-RAY EXAM L-S SPINE BENDING: CPT | Mod: TC

## 2024-04-17 PROCEDURE — 99999 PR PBB SHADOW E&M-EST. PATIENT-LVL IV: CPT | Mod: PBBFAC,,, | Performed by: PHYSICAL MEDICINE & REHABILITATION

## 2024-04-17 PROCEDURE — 3074F SYST BP LT 130 MM HG: CPT | Mod: CPTII,S$GLB,, | Performed by: PHYSICAL MEDICINE & REHABILITATION

## 2024-04-17 RX ORDER — METHOCARBAMOL 500 MG/1
500 TABLET, FILM COATED ORAL 2 TIMES DAILY PRN
Qty: 60 TABLET | Refills: 0 | Status: SHIPPED | OUTPATIENT
Start: 2024-04-17

## 2024-04-17 RX ORDER — DICLOFENAC SODIUM 50 MG/1
50 TABLET, DELAYED RELEASE ORAL 2 TIMES DAILY PRN
Qty: 60 TABLET | Refills: 0 | Status: SHIPPED | OUTPATIENT
Start: 2024-04-17

## 2024-04-17 NOTE — PROGRESS NOTES
New Patient Chronic Pain Note (Initial Visit)    Referring Physician: Juve Vick    PCP: Andres Linton MD    Chief Complaint:   Chief Complaint   Patient presents with    Low-back Pain     Radiates from side to side    Leg Pain     Right leg tightness        SUBJECTIVE:    Marco A Perez is a 59 y.o. male who presents to the clinic for the evaluation of lower back and leg pain.  He was referred by his primary care team for further evaluation and management of this pain.  He has a past medical history of migraine, hypertension, hyperlipidemia, multiple other medical comorbidities as listed in his chart.  The pain started about 1 year ago and symptoms have been worsening.The pain is located in the bilateral lumbosacral area and radiates to the right lower extremity extending posteriorly to the foot and ankle.  The pain is described as  tightness, cramping, dull, achy  and is rated as 5/10. The pain is rated with a score of  3/10 on the BEST day and a score of 10/10 on the WORST day.  Symptoms interfere with daily activity. The pain is exacerbated by bending, moving, household chores and yard work.  The pain is mitigated by rest and Tylenol.       Patient denies night fever/night sweats, urinary incontinence, bowel incontinence, significant weight loss, significant motor weakness, and loss of sensations.    Pain Disability Index Review:      4/17/2024     2:30 PM   Last 3 PDI Scores   Pain Disability Index (PDI) 17       Non-Pharmacologic Treatments:  Physical Therapy/Home Exercise: yes, has completed 6+ weeks within the past 12 months  Ice/Heat:yes  TENS: no  Acupuncture: no  Massage: yes  Chiropractic: no    Other: no      Pain Medications:  - Opioids:  None recently  - Adjuvant Medications:  Fioricet, Mobic  - Anti-Coagulants: Aspirin     report:  Reviewed and consistent with medication use as prescribed.    Pain Procedures:   Denies      Imaging:   No pertinent imaging available to review    Past Medical  History:   Diagnosis Date    HTN (hypertension)     Hyperlipidemia     Migraine     JUAN (obstructive sleep apnea) 2017    mild t mod     Past Surgical History:   Procedure Laterality Date    COLONOSCOPY N/A 11/10/2017    Procedure: COLONOSCOPY;  Surgeon: Reed Penny MD;  Location: [facility];  Service: Endoscopy;  Laterality: N/A;     Social History     Socioeconomic History    Marital status:    Tobacco Use    Smoking status: Never    Smokeless tobacco: Never   Substance and Sexual Activity    Alcohol use: No    Drug use: No     Social Determinants of Health     Transportation Needs: No Transportation Needs (8/5/2020)    PRAPARE - Transportation     Lack of Transportation (Medical): No     Lack of Transportation (Non-Medical): No   Social Connections: Unknown (8/5/2020)    Social Connection and Isolation Panel [NHANES]     Frequency of Communication with Friends and Family: Three times a week     Frequency of Social Gatherings with Friends and Family: Patient declined     Active Member of Clubs or Organizations: No     Attends Club or Organization Meetings: Never     Marital Status:      Family History   Problem Relation Name Age of Onset    Heart disease Mother      Heart disease Father      Diabetes Sister         Review of patient's allergies indicates:   Allergen Reactions    Nsaids (non-steroidal anti-inflammatory drug)      Renal insuff       Current Outpatient Medications   Medication Sig Dispense Refill    amLODIPine (NORVASC) 5 MG tablet Take 1 tablet (5 mg total) by mouth once daily. 90 tablet 3    aspirin (ECOTRIN) 81 MG EC tablet Take 81 mg by mouth once daily.      butalbital-acetaminophen-caffeine -40 mg (FIORICET, ESGIC) -40 mg per tablet TAKE ONE TO TWO TABLETS BY MOUTH EVERY 4 HOURS AS NEEDED 30 tablet 1    hydroCHLOROthiazide (HYDRODIURIL) 25 MG tablet Take 1 tablet (25 mg total) by mouth once daily. 90 tablet 3    meloxicam (MOBIC) 15 MG tablet Take 1 tablet (15  "mg total) by mouth once daily. 14 tablet 0    pravastatin (PRAVACHOL) 40 MG tablet Take 1 tablet (40 mg total) by mouth once daily. 90 tablet 4    diclofenac (VOLTAREN) 50 MG EC tablet Take 1 tablet (50 mg total) by mouth 2 (two) times daily as needed (pain). 60 tablet 0    methocarbamoL (ROBAXIN) 500 MG Tab Take 1 tablet (500 mg total) by mouth 2 (two) times daily as needed (muscle spasms). 60 tablet 0    sildenafiL (VIAGRA) 100 MG tablet Take 1/2 to 1 tablet by mouth one hour prior to intercourse. Max 1 tablet per 24 hours. (Patient not taking: Reported on 4/17/2024) 30 tablet 11     No current facility-administered medications for this visit.       Review of Systems     GENERAL:  No weight loss, malaise or fevers.  HEENT:   No recent changes in vision or hearing  NECK:  Negative for lumps, no difficulty with swallowing.  RESPIRATORY:  Negative for cough, wheezing or shortness of breath, patient denies any recent URI.  CARDIOVASCULAR:  Negative for chest pain, leg swelling or palpitations.  GI:  Negative for abdominal discomfort, blood in stools or black stools or change in bowel habits.  MUSCULOSKELETAL:  See HPI.  SKIN:  Negative for lesions, rash, and itching.  PSYCH:  No mood disorder or recent psychosocial stressors.  Patients sleep is not disturbed secondary to pain.  HEMATOLOGY/LYMPHOLOGY:  Negative for prolonged bleeding, bruising easily or swollen nodes.  Patient is currently taking anti-coagulants  NEURO:   No history of headaches, syncope, paralysis, seizures or tremors.  All other reviewed and negative other than HPI.    OBJECTIVE:    /78   Pulse 69   Ht 5' 11" (1.803 m)   Wt 87 kg (191 lb 12.8 oz)   BMI 26.75 kg/m²         Physical Exam    GENERAL: Well appearing, in no acute distress, alert and oriented x3.  PSYCH:  Mood and affect appropriate.  SKIN: Skin color, texture, turgor normal, no rashes or lesions.  HEAD/FACE:  Normocephalic, atraumatic. Cranial nerves grossly intact.  CV: RRR " with palpation of the radial artery.  PULM: No evidence of respiratory difficulty, symmetric chest rise.  GI:  Soft and non-tender.  BACK: Straight leg raising in the sitting and supine positions is negative to radicular pain.  Minimal pain to palpation over the facet joints of the lumbar spine and lumbar paraspinals on the right.. Normal range of motion without pain reproduction.  Facet loading positive on the right.  EXTREMITIES: Peripheral joint ROM is full and pain free without obvious instability or laxity in all four extremities. No deformities, edema, or skin discoloration. Good capillary refill.  MUSCULOSKELETAL:  Hip and knee provocative maneuvers are negative.  There is no pain with palpation over the sacroiliac joints bilaterally.  FABERs test is negative.  FADIRs test is negative.   Bilateral upper and lower extremity strength is normal and symmetric.  No atrophy or tone abnormalities are noted.  NEURO: Bilateral upper and lower extremity coordination and muscle stretch reflexes are physiologic and symmetric.  Plantar response are downgoing. No clonus.  No loss of sensation is noted.  GAIT: normal.      LABS:  Lab Results   Component Value Date    WBC 4.50 11/09/2023    HGB 12.7 (L) 11/09/2023    HCT 38.7 (L) 11/09/2023    MCV 83 11/09/2023     11/09/2023       CMP  Sodium   Date Value Ref Range Status   12/05/2023 142 136 - 145 mmol/L Final     Potassium   Date Value Ref Range Status   12/05/2023 3.9 3.5 - 5.1 mmol/L Final     Chloride   Date Value Ref Range Status   12/05/2023 104 95 - 110 mmol/L Final     CO2   Date Value Ref Range Status   12/05/2023 28 23 - 29 mmol/L Final     Glucose   Date Value Ref Range Status   12/05/2023 93 70 - 110 mg/dL Final     BUN   Date Value Ref Range Status   12/05/2023 12 6 - 20 mg/dL Final     Creatinine   Date Value Ref Range Status   12/05/2023 1.3 0.5 - 1.4 mg/dL Final     Calcium   Date Value Ref Range Status   12/05/2023 9.3 8.7 - 10.5 mg/dL Final  "    Total Protein   Date Value Ref Range Status   12/05/2023 7.2 6.0 - 8.4 g/dL Final     Albumin   Date Value Ref Range Status   12/05/2023 4.1 3.5 - 5.2 g/dL Final     Total Bilirubin   Date Value Ref Range Status   12/05/2023 0.4 0.1 - 1.0 mg/dL Final     Comment:     For infants and newborns, interpretation of results should be based  on gestational age, weight and in agreement with clinical  observations.    Premature Infant recommended reference ranges:  Up to 24 hours.............<8.0 mg/dL  Up to 48 hours............<12.0 mg/dL  3-5 days..................<15.0 mg/dL  6-29 days.................<15.0 mg/dL       Alkaline Phosphatase   Date Value Ref Range Status   12/05/2023 41 (L) 55 - 135 U/L Final     AST   Date Value Ref Range Status   12/05/2023 17 10 - 40 U/L Final     ALT   Date Value Ref Range Status   12/05/2023 16 10 - 44 U/L Final     Anion Gap   Date Value Ref Range Status   12/05/2023 10 8 - 16 mmol/L Final     eGFR if    Date Value Ref Range Status   12/04/2021 >60.0 >60 mL/min/1.73 m^2 Final     eGFR if non    Date Value Ref Range Status   12/04/2021 >60.0 >60 mL/min/1.73 m^2 Final     Comment:     Calculation used to obtain the estimated glomerular filtration  rate (eGFR) is the CKD-EPI equation.          No results found for: "LABA1C", "HGBA1C"          ASSESSMENT: 59 y.o. year old male with lower back and right leg pain, consistent with     1. Lumbar spondylosis  X-Ray Lumbar Complete Including Flex And Ext            PLAN:   - Interventions:  None at this time .     - Anticoagulation use: Patient is taking ASA as 1° prevention    - Medications: I have stressed the importance of physical activity and a home exercise plan to help with pain and improve health. and Patient can continue with medications for now since they are providing benefits, using them appropriately, and without side effects.     Trial diclofenac 50 mg b.i.d. p.r.n.  Provide Robaxin 500 mg " b.i.d. p.r.n.            - Therapy:  Strongly consider formal physical therapy for several sessions and transition to home exercise program, we will see x-ray results of lumbar spine prior to referral    - Labs:  Reviewed    - Imaging: Reviewed available imaging with patient and answered any questions they had regarding study.Order Flexion-Extension X-rays of the Lumbar spine to rule out any instability.    - Consults/Referrals:  None at this time, likely PT    - Records:  Reviewed/Obtain old records from outside physicians and imaging    - Follow up visit: return to clinic as needed    - Counseled patient regarding the importance of activity modification and physical therapy    - This condition does not require this patient to take time off of work, and the primary goal of our Pain Management services is to improve the patient's functional capacity.    - Patient Questions: Answered all of the patient's questions regarding diagnosis, therapy, and treatment        The above plan and management options were discussed at length with patient. Patient is in agreement with the above and verbalized understanding.    I discussed the goals of interventional chronic pain management with the patient on today's visit.  I explained the utility of injections for diagnostic and therapeutic purposes.  We discussed a multimodal approach to pain including treating the patient's given worst pain at any given time.  We will use a systematic approach to addressing pain.  We will also adopt a multimodal approach that includes injections, adjuvant medications, physical therapy, at times psychiatry.  There may be a limited role for opioid use intermittently in the treatment of pain, more particularly for acute pain although no one approach can be used as a sole treatment modality.    I emphasized the importance of regular exercise, core strengthening and stretching, diet and weight loss as a cornerstone of long-term pain  management.      Navi Reyes MD  Interventional Pain Management  [facility]    Disclaimer:  This note was prepared using voice recognition system and is likely to have sound alike errors that may have been overlooked even after proof reading.  Please call me with any questions

## 2024-04-26 RX ORDER — PRAVASTATIN SODIUM 40 MG/1
40 TABLET ORAL
Qty: 90 TABLET | Refills: 2 | Status: SHIPPED | OUTPATIENT
Start: 2024-04-26

## 2024-04-26 NOTE — TELEPHONE ENCOUNTER
Refill Decision Note   Marco A Perez  is requesting a refill authorization.  Brief Assessment and Rationale for Refill:  Approve     Medication Therapy Plan:         Comments:     Note composed:12:53 PM 04/26/2024

## 2024-04-26 NOTE — TELEPHONE ENCOUNTER
No care due was identified.  [facility] Embedded Care Due Messages. Reference number: [id number].   4/26/2024 6:42:13 AM CDT

## 2024-08-29 ENCOUNTER — OFFICE VISIT (OUTPATIENT)
Dept: INTERNAL MEDICINE | Facility: CLINIC | Age: 59
End: 2024-08-29
Payer: COMMERCIAL

## 2024-08-29 VITALS
BODY MASS INDEX: 26.7 KG/M2 | OXYGEN SATURATION: 99 % | SYSTOLIC BLOOD PRESSURE: 112 MMHG | TEMPERATURE: 99 F | RESPIRATION RATE: 18 BRPM | HEART RATE: 84 BPM | HEIGHT: 71 IN | WEIGHT: 190.69 LBS | DIASTOLIC BLOOD PRESSURE: 68 MMHG

## 2024-08-29 DIAGNOSIS — M54.32 BILATERAL SCIATICA: Primary | ICD-10-CM

## 2024-08-29 DIAGNOSIS — M54.31 BILATERAL SCIATICA: Primary | ICD-10-CM

## 2024-08-29 PROCEDURE — 99214 OFFICE O/P EST MOD 30 MIN: CPT | Mod: S$GLB,,, | Performed by: FAMILY MEDICINE

## 2024-08-29 PROCEDURE — 3008F BODY MASS INDEX DOCD: CPT | Mod: CPTII,S$GLB,, | Performed by: FAMILY MEDICINE

## 2024-08-29 PROCEDURE — 3078F DIAST BP <80 MM HG: CPT | Mod: CPTII,S$GLB,, | Performed by: FAMILY MEDICINE

## 2024-08-29 PROCEDURE — 99999 PR PBB SHADOW E&M-EST. PATIENT-LVL IV: CPT | Mod: PBBFAC,,, | Performed by: FAMILY MEDICINE

## 2024-08-29 PROCEDURE — 1159F MED LIST DOCD IN RCRD: CPT | Mod: CPTII,S$GLB,, | Performed by: FAMILY MEDICINE

## 2024-08-29 PROCEDURE — 3074F SYST BP LT 130 MM HG: CPT | Mod: CPTII,S$GLB,, | Performed by: FAMILY MEDICINE

## 2024-08-29 NOTE — PROGRESS NOTES
"Chief Complaint: Leg Pain    History of Present Illness    CHIEF COMPLAINT:  Mr. Perez presents today for leg pain and swelling.    LEG PAIN AND SWELLING:  He reports bilateral leg pain extending from the ankle to the upper leg, present for approximately one month. The pain is described as dull, intermittent, and worse in the mornings, with episodes lasting a few hours. He experiences a feeling of tightness or "springy feel" when walking. Mild swelling is noticeable when removing socks. He denies any prior history of similar pain, associated weakness, numbness in the genital area, or changes in bowel or bladder control. Tylenol provides partial relief but does not completely alleviate the pain.    MEDICAL HISTORY:  He has a history of lower back pain previously diagnosed as arthritis, with no significant change since the onset of current leg symptoms. He also has a history of kidney issues, which precludes him from taking anti-inflammatory medications.    PREVIOUS TREATMENTS:  He denies undergoing physical therapy as recommended by Dr. Reyes in April for right leg tightness, stating he was advised to consider it only if symptoms worsened.    MEDICATIONS:  He is currently taking Tylenol for pain management. He is unable to take ibuprofen or other anti-inflammatory medications due to his kidney issues.    LIFESTYLE:  His job involves sitting for long periods.    ROS:  Musculoskeletal: -joint pain, +limb pain, +muscle pain, -muscle weakness  Neurological: -numbness          Objective:   Bilat lower ext 5/5 andsymm dtrs. Slr bilat some reprod pain  Assessment:       1. Bilateral sciatica        Plan:   Assessment & Plan    Assessed bilateral leg pain and symptoms, consistent with sciatica  Considered progression of previously noted back issue and right leg tightness  Evaluated for red flag symptoms, including bowel/bladder control issues and weakness  Determined conservative management appropriate given intermittent " nature of symptoms  Deferred prescribing additional medications (e.g., steroids, nerve pain medication) at this time  Noted MRI may be indicated if symptoms worsen or conservative measures fail  SCIATICA:  - Explained sciatica and its typical presentation.  - Discussed potential progression of back issues.  - Referred to physical therapy for management of sciatica symptoms.  BACK ISSUES:  - Mr. Perez to avoid heavy lifting.  KIDNEY ISSUES:  - Advised against use of ibuprofen or other NSAIDs due to history of kidney issues.  PAIN MANAGEMENT:  - Continued Tylenol as needed for pain relief.  PATIENT EDUCATION:  - Educated on red flag symptoms requiring immediate medical attention (e.g., sudden bowel/bladder control issues, weakness).  - Informed about rare but serious complications like spinal cord compression.  FOLLOW UP:  - Follow up at next scheduled annual visit in a few months.  - Contact office if experiencing numbness in genital area, leg weakness, uncontrollable pain, or bowel/bladder control issues.  - Follow up with Dr. Reyes if symptoms persist after completing physical therapy.        Wants to avoid adding med so holding off on gpntnin, steroids    Annual dec sched

## 2024-09-09 ENCOUNTER — TELEPHONE (OUTPATIENT)
Dept: INTERNAL MEDICINE | Facility: CLINIC | Age: 59
End: 2024-09-09
Payer: COMMERCIAL

## 2024-09-09 NOTE — TELEPHONE ENCOUNTER
Rosalva Diane Staff  Caller: Unspecified (Today,  7:59 AM)  Good Morning/Afternoon,    Your patient (Pt's name) was scheduled for a Physical Therapy/Occupational/Speech evaluation with [facility] on (Date).  However, the patient did not show up for the visit.    If you speak with your patient can you please ask the patient to reach out to our department for scheduling @ [phone]. We will be happy to get them scheduled.    Sincerely,  Rosalva Diane

## 2024-09-09 NOTE — TELEPHONE ENCOUNTER
----- Message from Rosalva Diane sent at 9/9/2024  7:59 AM CDT -----  Good Morning/Afternoon,    Your patient (Pt's name) was scheduled for a Physical Therapy/Occupational/Speech evaluation with [facility] on (Date).  However, the patient did not show up for the visit.     If you speak with your patient can you please ask the patient to reach out to our department for scheduling @ [phone]. We will be happy to get them scheduled.     Sincerely,  Rosalva Diane

## 2024-11-07 ENCOUNTER — OFFICE VISIT (OUTPATIENT)
Dept: INTERNAL MEDICINE | Facility: CLINIC | Age: 59
End: 2024-11-07
Payer: COMMERCIAL

## 2024-11-07 ENCOUNTER — HOSPITAL ENCOUNTER (OUTPATIENT)
Dept: RADIOLOGY | Facility: HOSPITAL | Age: 59
Discharge: HOME | End: 2024-11-07
Attending: FAMILY MEDICINE
Payer: COMMERCIAL

## 2024-11-07 VITALS
DIASTOLIC BLOOD PRESSURE: 76 MMHG | SYSTOLIC BLOOD PRESSURE: 118 MMHG | HEIGHT: 71 IN | HEART RATE: 77 BPM | OXYGEN SATURATION: 97 % | WEIGHT: 185.63 LBS | TEMPERATURE: 98 F | BODY MASS INDEX: 25.99 KG/M2

## 2024-11-07 DIAGNOSIS — Z23 NEED FOR VACCINATION: Primary | ICD-10-CM

## 2024-11-07 DIAGNOSIS — M54.50 CHRONIC RIGHT-SIDED LOW BACK PAIN WITHOUT SCIATICA: ICD-10-CM

## 2024-11-07 DIAGNOSIS — G89.29 CHRONIC RIGHT-SIDED LOW BACK PAIN WITHOUT SCIATICA: ICD-10-CM

## 2024-11-07 PROCEDURE — 3008F BODY MASS INDEX DOCD: CPT | Mod: CPTII,S$GLB,, | Performed by: FAMILY MEDICINE

## 2024-11-07 PROCEDURE — 1159F MED LIST DOCD IN RCRD: CPT | Mod: CPTII,S$GLB,, | Performed by: FAMILY MEDICINE

## 2024-11-07 PROCEDURE — 99999 PR PBB SHADOW E&M-EST. PATIENT-LVL IV: CPT | Mod: PBBFAC,,, | Performed by: FAMILY MEDICINE

## 2024-11-07 PROCEDURE — 71046 X-RAY EXAM CHEST 2 VIEWS: CPT | Mod: TC

## 2024-11-07 PROCEDURE — 3078F DIAST BP <80 MM HG: CPT | Mod: CPTII,S$GLB,, | Performed by: FAMILY MEDICINE

## 2024-11-07 PROCEDURE — 90471 IMMUNIZATION ADMIN: CPT | Mod: S$GLB,,, | Performed by: FAMILY MEDICINE

## 2024-11-07 PROCEDURE — 99214 OFFICE O/P EST MOD 30 MIN: CPT | Mod: 25,S$GLB,, | Performed by: FAMILY MEDICINE

## 2024-11-07 PROCEDURE — 3074F SYST BP LT 130 MM HG: CPT | Mod: CPTII,S$GLB,, | Performed by: FAMILY MEDICINE

## 2024-11-07 PROCEDURE — 90656 IIV3 VACC NO PRSV 0.5 ML IM: CPT | Mod: S$GLB,,, | Performed by: FAMILY MEDICINE

## 2024-11-08 DIAGNOSIS — R82.90 ABNORMAL URINALYSIS: Primary | ICD-10-CM

## 2024-11-13 NOTE — PROGRESS NOTES
Chief Complaint: Hip Pain    History of Present Illness    CHIEF COMPLAINT:  Mr. Perez presents today for follow-up on back pain.    LOWER BACK PAIN:  He reports intermittent lower back pain in the tailbone area, with pain-free periods lasting 3-4 weeks. When present, the pain typically persists for 3-4 days. He describes difficulty standing up straight after prolonged sitting. Sometimes the pain radiates down his leg, though leg pain is less frequent than in previous episodes.    NEW ABDOMINAL PAIN:  He reports new onset of pain in the lower rib area that started last week, radiating around the abdomen and affected by movement. The pain moves to the lower part of the stomach and is located under the rib. He woke up with the pain without any specific precipitating cause and denies experiencing pain on the opposite side.    URINARY SYMPTOMS:  He reports frequent urination at night, waking up to urinate 3-4 times per night. He denies any increase in urinary frequency compared to his usual pattern, attributing the nighttime urination to his typical fluid intake.    RECENT RESPIRATORY SYMPTOMS:  He experienced respiratory symptoms that resolved last week, including a stuffy feeling during the daytime and discomfort in the throat area. The symptoms began approximately three weeks ago and improved with OTC medications. He denies nighttime symptoms, coughing, or breathing difficulties.    NEUROLOGICAL SYMPTOMS:  He reports experiencing intermittent numbness in one of his fingers.    IMAGING HISTORY:  Previous imaging revealed arthritic changes in the lower lumbar spine, slight spinal curvature, and spondylolisthesis.      ROS:  General: -fever, -chills, -fatigue, -weight gain, -weight loss  Eyes: -vision changes, -redness, -discharge  ENT: -ear pain, +nasal congestion, -sore throat  Cardiovascular: -chest pain, -palpitations, -lower extremity edema  Respiratory: -cough, -shortness of breath  Gastrointestinal: -abdominal  pain, -nausea, -vomiting, -diarrhea, -constipation, -blood in stool  Genitourinary: -dysuria, -hematuria, +frequency  Musculoskeletal: -joint pain, +muscle pain, +back pain  Skin: -rash, -lesion  Neurological: -headache, -dizziness, +numbness, -tingling  Psychiatric: -anxiety, -depression, -sleep difficulty          Objective:   Physical Exam    Vitals: Reviewed. Nursing note reviewed.  Constitutional: Alert.  HENT: Normocephalic. Atraumatic. External ears normal. Nose normal. PERRL. Conjunctivae normal.  Neck: ROM normal. Supple.  Cardiovascular: Normal rate and regular rhythm. Normal heart sounds.  Pulmonary: Normal breath sounds. Effort normal.  Abdominal: Bowel sounds are normal. Soft.  Musculoskeletal: ROM normal.  Skin: Warm. Dry.  Neurological: Oriented x3.  Psychiatric: Behavior normal. Thought content normal. Judgment normal.  MSK: Spine - Lumbar: SLIGHT CURVATURE AND ROTATION OF THE SPINE.       Assessment:       1. Need for vaccination    2. Chronic right-sided low back pain without sciatica        Plan:   Assessment & Plan    Assessed patient's lower back pain, including new pain in tailbone and abdominal area  Noted possible connection between recent cold symptoms and onset of new pain  Reviewed previous x-ray results showing arthritic changes and mild spondylolisthesis in lower lumbar spine  Observed slight curvature and rotation in patient's back  Suspect musculoskeletal origin for current pain, but ordering tests to rule out kidney issues or lung problems    SCOLIOSIS:  - Explained the observed curvature in the patient's back and its potential relation to the prominent rib on one side.    SPONDYLOLISTHESIS AND LUMBAR SPINE ARTHRITIS:  - Discussed the x-ray findings, including mild spondylolisthesis and arthritic changes in the lower lumbar spine.    BACK PAIN:  - Referred to physical therapy for ongoing back pain management, leaving the decision to pursue it up to the patient due to cost  concerns.    KIDNEY CONCERNS:  - Ordered a urine test to rule out kidney issues.    RIB PAIN:  - Ordered an x-ray of the lungs to check for potential causes of pain in the rib area.

## 2024-12-07 ENCOUNTER — LAB VISIT (OUTPATIENT)
Dept: LAB | Facility: HOSPITAL | Age: 59
End: 2024-12-07
Attending: FAMILY MEDICINE
Payer: COMMERCIAL

## 2024-12-07 DIAGNOSIS — Z12.5 SCREENING FOR PROSTATE CANCER: ICD-10-CM

## 2024-12-07 DIAGNOSIS — R82.90 ABNORMAL URINALYSIS: ICD-10-CM

## 2024-12-07 DIAGNOSIS — I10 ESSENTIAL HYPERTENSION: ICD-10-CM

## 2024-12-07 DIAGNOSIS — Z00.00 ROUTINE HEALTH MAINTENANCE: ICD-10-CM

## 2024-12-07 LAB
ALBUMIN SERPL BCP-MCNC: 4.1 G/DL (ref 3.5–5.2)
ALP SERPL-CCNC: 41 U/L (ref 40–150)
ALT SERPL W/O P-5'-P-CCNC: 13 U/L (ref 10–44)
ANION GAP SERPL CALC-SCNC: 11 MMOL/L (ref 8–16)
AST SERPL-CCNC: 16 U/L (ref 10–40)
BILIRUB SERPL-MCNC: 0.5 MG/DL (ref 0.1–1)
BILIRUB UR QL STRIP: NEGATIVE
BUN SERPL-MCNC: 19 MG/DL (ref 6–20)
CALCIUM SERPL-MCNC: 9.7 MG/DL (ref 8.7–10.5)
CHLORIDE SERPL-SCNC: 104 MMOL/L (ref 95–110)
CHOLEST SERPL-MCNC: 136 MG/DL (ref 120–199)
CHOLEST/HDLC SERPL: 3 {RATIO} (ref 2–5)
CLARITY UR: CLEAR
CO2 SERPL-SCNC: 27 MMOL/L (ref 23–29)
COLOR UR: YELLOW
COMPLEXED PSA SERPL-MCNC: 0.7 NG/ML (ref 0–4)
CREAT SERPL-MCNC: 1.7 MG/DL (ref 0.5–1.4)
EST. GFR  (NO RACE VARIABLE): 46.2 ML/MIN/1.73 M^2
ESTIMATED AVG GLUCOSE: 117 MG/DL (ref 68–131)
GLUCOSE SERPL-MCNC: 105 MG/DL (ref 70–110)
GLUCOSE UR QL STRIP: NEGATIVE
HBA1C MFR BLD: 5.7 % (ref 4–5.6)
HDLC SERPL-MCNC: 45 MG/DL (ref 40–75)
HDLC SERPL: 33.1 % (ref 20–50)
HGB UR QL STRIP: ABNORMAL
KETONES UR QL STRIP: NEGATIVE
LDLC SERPL CALC-MCNC: 76 MG/DL (ref 63–159)
LEUKOCYTE ESTERASE UR QL STRIP: NEGATIVE
MICROSCOPIC COMMENT: NORMAL
NITRITE UR QL STRIP: NEGATIVE
NONHDLC SERPL-MCNC: 91 MG/DL
PH UR STRIP: 7 [PH] (ref 5–8)
POTASSIUM SERPL-SCNC: 4 MMOL/L (ref 3.5–5.1)
PROT SERPL-MCNC: 7.3 G/DL (ref 6–8.4)
PROT UR QL STRIP: NEGATIVE
RBC #/AREA URNS HPF: 4 /HPF (ref 0–4)
SODIUM SERPL-SCNC: 142 MMOL/L (ref 136–145)
SP GR UR STRIP: 1.02 (ref 1–1.03)
TRIGL SERPL-MCNC: 75 MG/DL (ref 30–150)
URN SPEC COLLECT METH UR: ABNORMAL
WBC #/AREA URNS HPF: 3 /HPF (ref 0–5)
WBC CLUMPS URNS QL MICRO: NORMAL

## 2024-12-07 PROCEDURE — 80061 LIPID PANEL: CPT | Performed by: FAMILY MEDICINE

## 2024-12-07 PROCEDURE — 80053 COMPREHEN METABOLIC PANEL: CPT | Performed by: FAMILY MEDICINE

## 2024-12-07 PROCEDURE — 83036 HEMOGLOBIN GLYCOSYLATED A1C: CPT | Performed by: FAMILY MEDICINE

## 2024-12-07 PROCEDURE — 36415 COLL VENOUS BLD VENIPUNCTURE: CPT | Performed by: FAMILY MEDICINE

## 2024-12-07 PROCEDURE — 81000 URINALYSIS NONAUTO W/SCOPE: CPT | Performed by: FAMILY MEDICINE

## 2024-12-07 PROCEDURE — 84153 ASSAY OF PSA TOTAL: CPT | Performed by: FAMILY MEDICINE

## 2024-12-12 ENCOUNTER — OFFICE VISIT (OUTPATIENT)
Dept: INTERNAL MEDICINE | Facility: CLINIC | Age: 59
End: 2024-12-12
Payer: COMMERCIAL

## 2024-12-12 VITALS
BODY MASS INDEX: 26.05 KG/M2 | TEMPERATURE: 99 F | WEIGHT: 186.06 LBS | OXYGEN SATURATION: 97 % | SYSTOLIC BLOOD PRESSURE: 120 MMHG | HEART RATE: 81 BPM | DIASTOLIC BLOOD PRESSURE: 84 MMHG | HEIGHT: 71 IN

## 2024-12-12 DIAGNOSIS — I10 PRIMARY HYPERTENSION: ICD-10-CM

## 2024-12-12 DIAGNOSIS — Z00.00 ROUTINE HEALTH MAINTENANCE: Primary | ICD-10-CM

## 2024-12-12 DIAGNOSIS — R73.03 PREDIABETES: ICD-10-CM

## 2024-12-12 PROCEDURE — 1159F MED LIST DOCD IN RCRD: CPT | Mod: CPTII,S$GLB,, | Performed by: FAMILY MEDICINE

## 2024-12-12 PROCEDURE — 3079F DIAST BP 80-89 MM HG: CPT | Mod: CPTII,S$GLB,, | Performed by: FAMILY MEDICINE

## 2024-12-12 PROCEDURE — 99999 PR PBB SHADOW E&M-EST. PATIENT-LVL IV: CPT | Mod: PBBFAC,,, | Performed by: FAMILY MEDICINE

## 2024-12-12 PROCEDURE — 3074F SYST BP LT 130 MM HG: CPT | Mod: CPTII,S$GLB,, | Performed by: FAMILY MEDICINE

## 2024-12-12 PROCEDURE — 99396 PREV VISIT EST AGE 40-64: CPT | Mod: S$GLB,,, | Performed by: FAMILY MEDICINE

## 2024-12-12 PROCEDURE — 3044F HG A1C LEVEL LT 7.0%: CPT | Mod: CPTII,S$GLB,, | Performed by: FAMILY MEDICINE

## 2024-12-12 PROCEDURE — 3008F BODY MASS INDEX DOCD: CPT | Mod: CPTII,S$GLB,, | Performed by: FAMILY MEDICINE

## 2024-12-12 RX ORDER — ONDANSETRON 4 MG/1
4 TABLET, ORALLY DISINTEGRATING ORAL EVERY 8 HOURS PRN
COMMUNITY
Start: 2024-12-07 | End: 2024-12-14

## 2024-12-12 RX ORDER — PRAVASTATIN SODIUM 40 MG/1
40 TABLET ORAL DAILY
Qty: 90 TABLET | Refills: 4 | Status: SHIPPED | OUTPATIENT
Start: 2024-12-12

## 2024-12-12 RX ORDER — MECLIZINE HYDROCHLORIDE 25 MG/1
25 TABLET ORAL 3 TIMES DAILY PRN
COMMUNITY
Start: 2024-12-07 | End: 2024-12-14

## 2024-12-12 NOTE — PROGRESS NOTES
Chief Complaint: Annual Exam    History of Present Illness    CHIEF COMPLAINT:  Mr. Perez presents today with nasal congestion and recent fall.    UPPER RESPIRATORY SYMPTOMS:  He reports nasal congestion that started about a week ago, accompanied by a low-grade fever of 99.4°F. He denies shortness of breath or feeling generally unwell.    SYNCOPE AND FALL:  He experienced syncope and fell face first last Wednesday after getting up too quickly. He had one episode of vomiting after hitting his head which prompted a hospital visit. No subsequent vomiting has occurred. He denies chest pain, palpitations, or loss of bowel/bladder function.    LABS:  Creatinine was 1.39, showing improvement in kidney function. A1C was slightly elevated, indicating pre-diabetes. Urine test, PSA, and cholesterol were within normal limits. No evidence of anemia. EKG and brain scan were normal.    DIET:  He frequently consumes bread products, typically eating 2-3 sandwiches with wheat bread at work, which may be contributing to elevated A1C.          Objective:   Physical Exam    Vitals: Reviewed. Nursing note reviewed.  Constitutional: Alert.  HENT: Normocephalic. Atraumatic. External ears normal. Nose normal. PERRL. Conjunctivae normal. All normal.  Neck: ROM normal. Supple. Neck arteries sound clear.  Cardiovascular: Normal rate and regular rhythm. Normal heart sounds.  Pulmonary: Normal breath sounds. Effort normal.  Abdominal: Bowel sounds are normal. Soft.  Musculoskeletal: ROM normal.  Skin: Warm. Dry.  Neurological: Oriented x3.  Psychiatric: Behavior normal. Thought content normal. Judgment normal.       Assessment:       1. Routine health maintenance    2. Primary hypertension    3. Prediabetes        Plan:   Assessment & Plan    Assessed patient's recent fall, likely due to orthostatic hypotension from sudden standing  Evaluated kidney function, noting creatinine levels within patient's baseline range  Reviewed A1C results  indicating pre-diabetes, warranting lifestyle modifications  Considered recent ED visits for fall-related symptoms, noting normal diagnostic results    PLAN SUMMARY:  Provided education on A1C results and pre-diabetes implications  Recommend daily exercise for blood sugar management  Advised on carbohydrate intake, including portion control for bread  Instructed to avoid simple sugars (candy, sodas, desserts)  Follow-up in 6 months for kidney function recheck    PREDIABETES:  Explained A1C test results and pre-diabetes implications.  Discussed carbohydrate intake, particularly focusing on bread consumption.  Provided handout on recommended carbohydrate portions per day.  Mr. Perez to stay active and exercise daily to help manage blood sugar.  Recommend reducing carbohydrate intake, particularly bread consumption.  Mr. Perez to avoid simple sugars like candy, sodas, and desserts.  Recommend limiting bread intake to appropriate portion sizes (about 0.5 cup or a handful per day).    FOLLOW-UP:  Follow up in 6 months for recheck of kidney function.      If no better 3-4 days followup sooner if any worsening or change in symptoms or lack of resolution      This note was generated with the assistance of ambient listening technology. Verbal consent was obtained by the patient and accompanying visitor(s) for the recording of patient appointment to facilitate this note. I attest to having reviewed and edited the generated note for accuracy, though some syntax or spelling errors may persist. Please contact the author of this note for any clarification.

## 2025-02-23 DIAGNOSIS — I10 ESSENTIAL HYPERTENSION: ICD-10-CM

## 2025-02-24 RX ORDER — HYDROCHLOROTHIAZIDE 25 MG/1
25 TABLET ORAL DAILY
Qty: 90 TABLET | Refills: 3 | Status: SHIPPED | OUTPATIENT
Start: 2025-02-24 | End: 2026-02-24

## 2025-02-24 RX ORDER — AMLODIPINE BESYLATE 5 MG/1
5 TABLET ORAL DAILY
Qty: 90 TABLET | Refills: 3 | Status: SHIPPED | OUTPATIENT
Start: 2025-02-24

## 2025-03-20 ENCOUNTER — OFFICE VISIT (OUTPATIENT)
Dept: INTERNAL MEDICINE | Facility: CLINIC | Age: 60
End: 2025-03-20
Payer: COMMERCIAL

## 2025-03-20 ENCOUNTER — HOSPITAL ENCOUNTER (OUTPATIENT)
Dept: RADIOLOGY | Facility: HOSPITAL | Age: 60
Discharge: HOME | End: 2025-03-20
Attending: FAMILY MEDICINE
Payer: COMMERCIAL

## 2025-03-20 ENCOUNTER — RESULTS FOLLOW-UP (OUTPATIENT)
Dept: INTERNAL MEDICINE | Facility: CLINIC | Age: 60
End: 2025-03-20

## 2025-03-20 VITALS
TEMPERATURE: 98 F | BODY MASS INDEX: 25.96 KG/M2 | HEART RATE: 71 BPM | HEIGHT: 71 IN | SYSTOLIC BLOOD PRESSURE: 128 MMHG | OXYGEN SATURATION: 99 % | DIASTOLIC BLOOD PRESSURE: 86 MMHG | WEIGHT: 185.44 LBS

## 2025-03-20 DIAGNOSIS — M79.642 LEFT HAND PAIN: ICD-10-CM

## 2025-03-20 DIAGNOSIS — M79.642 LEFT HAND PAIN: Primary | ICD-10-CM

## 2025-03-20 DIAGNOSIS — S09.93XD FACIAL INJURY, SUBSEQUENT ENCOUNTER: ICD-10-CM

## 2025-03-20 PROCEDURE — 99999 PR PBB SHADOW E&M-EST. PATIENT-LVL V: CPT | Mod: PBBFAC,,, | Performed by: FAMILY MEDICINE

## 2025-03-20 PROCEDURE — 73130 X-RAY EXAM OF HAND: CPT | Mod: 26,LT,, | Performed by: STUDENT IN AN ORGANIZED HEALTH CARE EDUCATION/TRAINING PROGRAM

## 2025-03-20 PROCEDURE — 3079F DIAST BP 80-89 MM HG: CPT | Mod: CPTII,S$GLB,, | Performed by: FAMILY MEDICINE

## 2025-03-20 PROCEDURE — 3074F SYST BP LT 130 MM HG: CPT | Mod: CPTII,S$GLB,, | Performed by: FAMILY MEDICINE

## 2025-03-20 PROCEDURE — 1159F MED LIST DOCD IN RCRD: CPT | Mod: CPTII,S$GLB,, | Performed by: FAMILY MEDICINE

## 2025-03-20 PROCEDURE — 73130 X-RAY EXAM OF HAND: CPT | Mod: TC,LT

## 2025-03-20 PROCEDURE — 99214 OFFICE O/P EST MOD 30 MIN: CPT | Mod: S$GLB,,, | Performed by: FAMILY MEDICINE

## 2025-03-20 PROCEDURE — G2211 COMPLEX E/M VISIT ADD ON: HCPCS | Mod: S$GLB,,, | Performed by: FAMILY MEDICINE

## 2025-03-20 PROCEDURE — 3008F BODY MASS INDEX DOCD: CPT | Mod: CPTII,S$GLB,, | Performed by: FAMILY MEDICINE

## 2025-03-20 NOTE — PROGRESS NOTES
Chief Complaint: Follow-up (Pain in left hand/ eye swelling)    History of Present Illness    CHIEF COMPLAINT:  Mr. Perez presents today for follow up of injuries from a fall in early December.    FALL-RELATED INJURIES:  He reports persistent right brow swelling since the fall on December 5th that has not completely resolved. He experiences soreness and a wave-like sensation when touching the affected area, with increased swelling at night. He also reports pain in the left fifth finger that radiates up when bumped, particularly while driving when contacting the steering wheel. Pain is elicited with pressure on specific areas of the finger. The second knuckle remains swollen since the initial injury.    EYE SYMPTOMS:  He reports left eye irritation with a sensation of something stuck in the corner of his eye that began one morning before work. He denies any associated vision changes.    EMERGENCY ROOM VISITS:  He reports a second ER visit due to severe dizziness, nausea, and frequent vomiting. No recurr since early Dec.         Objective:   Gen nad  R brow bit swellilng no redness  Eomi perrla  Left med upper lid w small hair removed and sympt gone  [id number] mildswlling no redness  Left 5th mcp nl nl rom tend ap pully    Assessment:       1. Left hand pain    2. Facial injury, subsequent encounter      For body eye  Plan:   Assessment & Plan    Assessed left hand injury from December fall, suspecting nerve irritation rather than fracture based on pain pattern and physical exam.  Evaluated persistent swelling of right brow, considering possibility of undetected hairline fracture or fluid collection.  Removed small hair from left eye causing irritation.  Discussed potential causes of persistent swelling in brow area, including slow resorption of blood or fluid collection.    PLAN SUMMARY:  X-ray of left hand ordered to evaluate for fracture, joint disruption, and signs of arthritis  CT Orbital Area WO Contrast ordered  to assess persistent edema and rule out fracture  Foreign body (hair) removed from patient's eye  Watchful waiting recommended for hand pain; potential hand orthosis if pain becomes constant  Follow-up after CT Orbital Area is scheduled (today or next week)      CONTUSION OF HEAD:  Explained nerve pathways in face, relating to symptoms.  Noted patient's report of hitting head and face swelling on December 5th, with right brow still swollen and sore.  Observed increased swelling in the brow compared to previous exam.  Performed physical exam of the affected area.  Suspected swelling might be due to hematoma and edema rather than a fracture.  Ordered a focused CT of the orbital area to evaluate for fractures and fluid collection.    Ordered CT Orbital Area WO Contrast to evaluate persistent edema and rule out fracture.  Instructed patient to follow up after CT Orbital Area is scheduled, potentially today or next week.    LEFT MIDDLE FINGER INJURY:  Noted patient's report of pain in the left hand, specifically in the fifth digit and second metacarpophalangeal joint.  Performed physical exam of the affected digit and hand, noting pain and tenderness in specific areas.  Suspected neuropathic pain due to edema or irritation.  Planned to evaluate for potential fracture or joint disruption.  Ordered an x-ray of the left hand.  Recommend watchful waiting and potential hand orthosis if pain becomes constant.  Explained nerve pathways in hand, relating to symptoms.     second metacarpophalangeal joint of the left hand.  Observed edema in the joint and suspected possible arthritis.  Ordered x-ray of left hand to rule out fracture and assess joint condition.  Planned to evaluate x-ray for signs of arthritis or old fracture.      FOREIGN BODY IN LEFT EYE:  Noted patient's report of foreign body sensation in the left eye, particularly in the canthus.  Performed ocular exam and identified a small hair adhering to the  eyelid.  Removed the foreign body (hair) from the patient's eye.    Has Elyssa ren and f/u

## 2025-03-25 ENCOUNTER — HOSPITAL ENCOUNTER (OUTPATIENT)
Dept: RADIOLOGY | Facility: HOSPITAL | Age: 60
Discharge: HOME | End: 2025-03-25
Attending: FAMILY MEDICINE
Payer: COMMERCIAL

## 2025-03-25 DIAGNOSIS — S09.93XD FACIAL INJURY, SUBSEQUENT ENCOUNTER: ICD-10-CM

## 2025-03-25 PROCEDURE — 70480 CT ORBIT/EAR/FOSSA W/O DYE: CPT | Mod: 26,,, | Performed by: RADIOLOGY

## 2025-03-25 PROCEDURE — 70480 CT ORBIT/EAR/FOSSA W/O DYE: CPT | Mod: TC

## 2025-04-16 ENCOUNTER — TELEPHONE (OUTPATIENT)
Dept: INTERNAL MEDICINE | Facility: CLINIC | Age: 60
End: 2025-04-16
Payer: COMMERCIAL

## 2025-04-16 NOTE — TELEPHONE ENCOUNTER
----- Message from Viktor sent at 4/16/2025  2:02 PM CDT -----  Contact: self  .Type:  Needs Medical AdviceWho Called: .Marco A Yang the patient rather a call back or a response via [facility]? Call Vickie Call Back Number: .[phone] Information: Pt states he is needing a call from this office in regard to his eye, was told nothing is wrong but is still having issues.

## 2025-04-16 NOTE — TELEPHONE ENCOUNTER
Spoke with the patient stated that he still have concerns with his right eye. The patient was schedule an appointment with Dr Linton on 4/21/25 at 1:40 pm. The patient stated understanding.

## 2025-06-05 ENCOUNTER — LAB VISIT (OUTPATIENT)
Dept: LAB | Facility: HOSPITAL | Age: 60
End: 2025-06-05
Attending: FAMILY MEDICINE
Payer: COMMERCIAL

## 2025-06-05 DIAGNOSIS — I10 PRIMARY HYPERTENSION: ICD-10-CM

## 2025-06-05 DIAGNOSIS — R73.03 PREDIABETES: ICD-10-CM

## 2025-06-05 LAB
ANION GAP (OHS): 10 MMOL/L (ref 8–16)
BUN SERPL-MCNC: 17 MG/DL (ref 6–20)
CALCIUM SERPL-MCNC: 9.2 MG/DL (ref 8.7–10.5)
CHLORIDE SERPL-SCNC: 104 MMOL/L (ref 95–110)
CO2 SERPL-SCNC: 27 MMOL/L (ref 23–29)
CREAT SERPL-MCNC: 1.5 MG/DL (ref 0.5–1.4)
EAG (OHS): 117 MG/DL (ref 68–131)
GFR SERPLBLD CREATININE-BSD FMLA CKD-EPI: 53 ML/MIN/1.73/M2
GLUCOSE SERPL-MCNC: 88 MG/DL (ref 70–110)
HBA1C MFR BLD: 5.7 % (ref 4–5.6)
POTASSIUM SERPL-SCNC: 4.1 MMOL/L (ref 3.5–5.1)
SODIUM SERPL-SCNC: 141 MMOL/L (ref 136–145)

## 2025-06-05 PROCEDURE — 83036 HEMOGLOBIN GLYCOSYLATED A1C: CPT

## 2025-06-05 PROCEDURE — 36415 COLL VENOUS BLD VENIPUNCTURE: CPT

## 2025-06-05 PROCEDURE — 80048 BASIC METABOLIC PNL TOTAL CA: CPT

## 2025-06-17 ENCOUNTER — OFFICE VISIT (OUTPATIENT)
Dept: INTERNAL MEDICINE | Facility: CLINIC | Age: 60
End: 2025-06-17
Payer: COMMERCIAL

## 2025-06-17 VITALS
OXYGEN SATURATION: 97 % | HEART RATE: 79 BPM | BODY MASS INDEX: 26.11 KG/M2 | DIASTOLIC BLOOD PRESSURE: 76 MMHG | HEIGHT: 71 IN | WEIGHT: 186.5 LBS | SYSTOLIC BLOOD PRESSURE: 118 MMHG | TEMPERATURE: 98 F

## 2025-06-17 DIAGNOSIS — I10 PRIMARY HYPERTENSION: ICD-10-CM

## 2025-06-17 DIAGNOSIS — R73.03 PREDIABETES: Primary | ICD-10-CM

## 2025-06-17 DIAGNOSIS — Z12.5 SCREENING FOR PROSTATE CANCER: ICD-10-CM

## 2025-06-17 DIAGNOSIS — Z23 NEED FOR VACCINATION: ICD-10-CM

## 2025-06-17 PROCEDURE — 99214 OFFICE O/P EST MOD 30 MIN: CPT | Mod: 25,S$GLB,, | Performed by: FAMILY MEDICINE

## 2025-06-17 PROCEDURE — 3008F BODY MASS INDEX DOCD: CPT | Mod: CPTII,S$GLB,, | Performed by: FAMILY MEDICINE

## 2025-06-17 PROCEDURE — G2211 COMPLEX E/M VISIT ADD ON: HCPCS | Mod: S$GLB,,, | Performed by: FAMILY MEDICINE

## 2025-06-17 PROCEDURE — 3074F SYST BP LT 130 MM HG: CPT | Mod: CPTII,S$GLB,, | Performed by: FAMILY MEDICINE

## 2025-06-17 PROCEDURE — 3044F HG A1C LEVEL LT 7.0%: CPT | Mod: CPTII,S$GLB,, | Performed by: FAMILY MEDICINE

## 2025-06-17 PROCEDURE — 90471 IMMUNIZATION ADMIN: CPT | Mod: S$GLB,,, | Performed by: FAMILY MEDICINE

## 2025-06-17 PROCEDURE — 3078F DIAST BP <80 MM HG: CPT | Mod: CPTII,S$GLB,, | Performed by: FAMILY MEDICINE

## 2025-06-17 PROCEDURE — 90715 TDAP VACCINE 7 YRS/> IM: CPT | Mod: S$GLB,,, | Performed by: FAMILY MEDICINE

## 2025-06-17 PROCEDURE — 1159F MED LIST DOCD IN RCRD: CPT | Mod: CPTII,S$GLB,, | Performed by: FAMILY MEDICINE

## 2025-06-17 PROCEDURE — 99999 PR PBB SHADOW E&M-EST. PATIENT-LVL IV: CPT | Mod: PBBFAC,,, | Performed by: FAMILY MEDICINE

## 2025-06-17 NOTE — PROGRESS NOTES
Subjective:      Patient ID: Marco A Perez is a 60 y.o. male.    Chief Complaint: Follow-up    HPI sleep apnea history and was to have gotten CPAP mole around 2019 [facility] visit with [facility] planned for home another home sleep study but did not get ordered or done    Htn. Couple days no amlod and bps ok x one pm bit elev. [place] less tired.    Hypercholesterolemia: controlled. Tolerating medicine.    Chr creat around 1.5 stable. Over 5 y/a saw nephrol and felt to be normal considering physique/cystatin c ok      Prediab d/wd dietary shnanan(inc rice )    Past Medical History:   Diagnosis Date    HTN (hypertension)     Hyperlipidemia     Migraine     JUAN (obstructive sleep apnea) 2017    mild t mod      Past Surgical History:   Procedure Laterality Date    COLONOSCOPY N/A 11/10/2017    Procedure: COLONOSCOPY;  Surgeon: Reed Penny MD;  Location: [facility];  Service: Endoscopy;  Laterality: N/A;      Social History[1]   Family History   Problem Relation Name Age of Onset    Heart disease Mother      Heart disease Father      Diabetes Sister        Review of Systems        Objective:     Physical Exam  Constitutional:       Appearance: He is well-developed.   HENT:      Head: Normocephalic and atraumatic.      Mouth/Throat:      Pharynx: No oropharyngeal exudate.   Eyes:      Conjunctiva/sclera: Conjunctivae normal.      Pupils: Pupils are equal, round, and reactive to light.   Neck:      Vascular: No carotid bruit.   Cardiovascular:      Rate and Rhythm: Normal rate and regular rhythm.   Pulmonary:      Effort: Pulmonary effort is normal.      Breath sounds: Normal breath sounds.   Musculoskeletal:      Cervical back: Normal range of motion and neck supple.   Lymphadenopathy:      Cervical: No cervical adenopathy.   Skin:     General: Skin is warm and dry.   Neurological:      Mental Status: He is alert and oriented to person, place, and time.   Psychiatric:         Behavior: Behavior normal.         Thought  Content: Thought content normal.         Judgment: Judgment normal.       Assessment:         ICD-10-CM ICD-9-CM   1. Prediabetes  R73.03 790.29   2. Primary hypertension  I10 401.9   3. Screening for prostate cancer  Z12.5 V76.44   4. Need for vaccination  Z23 V05.9    nicole  Plan:        1. Prediabetes  -     Hemoglobin A1C; Future; Expected date: 12/14/2025    2. Primary hypertension  -     Comprehensive Metabolic Panel; Future; Expected date: 12/14/2025  -     Lipid Panel; Future; Expected date: 12/14/2025    3. Screening for prostate cancer  -     PSA, Screening; Future; Expected date: 12/14/2025    4. Need for vaccination  -     DIPH,PERTUSS(ACEL),TET VAC(PF)(ADULT)(ADACEL)(TDaP)         Can try either no amlod or no hctzfor a week or two to see how bps (140/90 or higher)do and fatigue does.also rec f/u sleep nicole onfatigue    Dm diet    Lab and follow up after in 6 months            [1]   Social History  Socioeconomic History    Marital status:    Tobacco Use    Smoking status: Never    Smokeless tobacco: Never   Substance and Sexual Activity    Alcohol use: No    Drug use: No     Social Drivers of Health     Financial Resource Strain: Low Risk  (6/16/2025)    Overall Financial Resource Strain (CARDIA)     Difficulty of Paying Living Expenses: Not very hard   Transportation Needs: Unknown (6/16/2025)    PRAPARE - Transportation     Lack of Transportation (Medical): No   Physical Activity: Insufficiently Active (6/16/2025)    Exercise Vital Sign     Days of Exercise per Week: 2 days     Minutes of Exercise per Session: 20 min   Stress: No Stress Concern Present (6/16/2025)    [place] [employer] - Occupational Stress Questionnaire     Feeling of Stress : Only a little   Housing Stability: Unknown (6/16/2025)    Housing Stability Vital Sign     Unable to Pay for Housing in the Last Year: Patient declined     Homeless in the Last Year: No

## 2025-06-17 NOTE — PATIENT INSTRUCTIONS
Prevnar 20 vaccine  via a pharmacy (new pneumonia vaccine)    Shingrix new shingles vaccine  via a pharmacy

## 2025-08-28 ENCOUNTER — OFFICE VISIT (OUTPATIENT)
Dept: INTERNAL MEDICINE | Facility: CLINIC | Age: 60
End: 2025-08-28
Payer: COMMERCIAL

## 2025-08-28 ENCOUNTER — HOSPITAL ENCOUNTER (OUTPATIENT)
Dept: RADIOLOGY | Facility: HOSPITAL | Age: 60
Discharge: HOME | End: 2025-08-28
Attending: FAMILY MEDICINE
Payer: COMMERCIAL

## 2025-08-28 VITALS
WEIGHT: 185.88 LBS | HEART RATE: 62 BPM | BODY MASS INDEX: 26.02 KG/M2 | OXYGEN SATURATION: 95 % | DIASTOLIC BLOOD PRESSURE: 82 MMHG | SYSTOLIC BLOOD PRESSURE: 116 MMHG | HEIGHT: 71 IN

## 2025-08-28 DIAGNOSIS — M25.561 ACUTE PAIN OF RIGHT KNEE: ICD-10-CM

## 2025-08-28 DIAGNOSIS — M25.561 ACUTE PAIN OF RIGHT KNEE: Primary | ICD-10-CM

## 2025-08-28 PROCEDURE — 3044F HG A1C LEVEL LT 7.0%: CPT | Mod: CPTII,S$GLB,, | Performed by: FAMILY MEDICINE

## 2025-08-28 PROCEDURE — 3074F SYST BP LT 130 MM HG: CPT | Mod: CPTII,S$GLB,, | Performed by: FAMILY MEDICINE

## 2025-08-28 PROCEDURE — 73562 X-RAY EXAM OF KNEE 3: CPT | Mod: 26,LT,, | Performed by: RADIOLOGY

## 2025-08-28 PROCEDURE — 73562 X-RAY EXAM OF KNEE 3: CPT | Mod: TC,LT

## 2025-08-28 PROCEDURE — 99999 PR PBB SHADOW E&M-EST. PATIENT-LVL IV: CPT | Mod: PBBFAC,,, | Performed by: FAMILY MEDICINE

## 2025-08-28 PROCEDURE — 99214 OFFICE O/P EST MOD 30 MIN: CPT | Mod: S$GLB,,, | Performed by: FAMILY MEDICINE

## 2025-08-28 PROCEDURE — 3008F BODY MASS INDEX DOCD: CPT | Mod: CPTII,S$GLB,, | Performed by: FAMILY MEDICINE

## 2025-08-28 PROCEDURE — 1159F MED LIST DOCD IN RCRD: CPT | Mod: CPTII,S$GLB,, | Performed by: FAMILY MEDICINE

## 2025-08-28 PROCEDURE — 3079F DIAST BP 80-89 MM HG: CPT | Mod: CPTII,S$GLB,, | Performed by: FAMILY MEDICINE

## 2025-08-28 PROCEDURE — 1160F RVW MEDS BY RX/DR IN RCRD: CPT | Mod: CPTII,S$GLB,, | Performed by: FAMILY MEDICINE

## 2025-08-28 RX ORDER — PREDNISONE 50 MG/1
50 TABLET ORAL DAILY
Qty: 5 TABLET | Refills: 0 | Status: SHIPPED | OUTPATIENT
Start: 2025-08-28 | End: 2025-09-02

## 2025-09-03 ENCOUNTER — TELEPHONE (OUTPATIENT)
Dept: INTERNAL MEDICINE | Facility: CLINIC | Age: 60
End: 2025-09-03
Payer: COMMERCIAL

## 2025-09-04 ENCOUNTER — TELEPHONE (OUTPATIENT)
Dept: INTERNAL MEDICINE | Facility: CLINIC | Age: 60
End: 2025-09-04
Payer: COMMERCIAL